# Patient Record
Sex: MALE | Race: WHITE | NOT HISPANIC OR LATINO
[De-identification: names, ages, dates, MRNs, and addresses within clinical notes are randomized per-mention and may not be internally consistent; named-entity substitution may affect disease eponyms.]

---

## 2017-02-14 ENCOUNTER — RESULT REVIEW (OUTPATIENT)
Age: 72
End: 2017-02-14

## 2017-03-19 ENCOUNTER — RESULT REVIEW (OUTPATIENT)
Age: 72
End: 2017-03-19

## 2017-04-27 ENCOUNTER — RESULT REVIEW (OUTPATIENT)
Age: 72
End: 2017-04-27

## 2017-04-27 ENCOUNTER — FORM ENCOUNTER (OUTPATIENT)
Age: 72
End: 2017-04-27

## 2017-04-28 ENCOUNTER — APPOINTMENT (OUTPATIENT)
Age: 72
End: 2017-04-28

## 2017-04-28 ENCOUNTER — LABORATORY RESULT (OUTPATIENT)
Age: 72
End: 2017-04-28

## 2017-04-28 DIAGNOSIS — I70.209 UNSPECIFIED ATHEROSCLEROSIS OF NATIVE ARTERIES OF EXTREMITIES, UNSPECIFIED EXTREMITY: ICD-10-CM

## 2017-05-23 ENCOUNTER — APPOINTMENT (OUTPATIENT)
Dept: VASCULAR SURGERY | Facility: CLINIC | Age: 72
End: 2017-05-23

## 2017-05-23 VITALS — BODY MASS INDEX: 30.34 KG/M2 | HEIGHT: 72 IN | TEMPERATURE: 98.2 F | RESPIRATION RATE: 16 BRPM | WEIGHT: 224 LBS

## 2017-05-23 VITALS — DIASTOLIC BLOOD PRESSURE: 94 MMHG | SYSTOLIC BLOOD PRESSURE: 162 MMHG | HEART RATE: 67 BPM

## 2017-05-23 DIAGNOSIS — Z87.09 PERSONAL HISTORY OF OTHER DISEASES OF THE RESPIRATORY SYSTEM: ICD-10-CM

## 2017-05-23 DIAGNOSIS — I10 ESSENTIAL (PRIMARY) HYPERTENSION: ICD-10-CM

## 2017-05-23 DIAGNOSIS — Z86.69 PERSONAL HISTORY OF OTHER DISEASES OF THE NERVOUS SYSTEM AND SENSE ORGANS: ICD-10-CM

## 2017-05-23 DIAGNOSIS — N28.9 DISORDER OF KIDNEY AND URETER, UNSPECIFIED: ICD-10-CM

## 2017-05-23 DIAGNOSIS — Z87.19 PERSONAL HISTORY OF OTHER DISEASES OF THE DIGESTIVE SYSTEM: ICD-10-CM

## 2017-05-23 DIAGNOSIS — Z78.9 OTHER SPECIFIED HEALTH STATUS: ICD-10-CM

## 2017-05-23 RX ORDER — TAMSULOSIN HYDROCHLORIDE 0.4 MG/1
0.4 CAPSULE ORAL
Refills: 0 | Status: ACTIVE | COMMUNITY

## 2017-05-23 RX ORDER — HYDRALAZINE HYDROCHLORIDE 25 MG/1
25 TABLET ORAL
Refills: 0 | Status: ACTIVE | COMMUNITY

## 2017-05-23 RX ORDER — METOPROLOL SUCCINATE 100 MG/1
100 TABLET, EXTENDED RELEASE ORAL
Refills: 0 | Status: ACTIVE | COMMUNITY

## 2017-09-21 ENCOUNTER — APPOINTMENT (OUTPATIENT)
Dept: VASCULAR SURGERY | Facility: CLINIC | Age: 72
End: 2017-09-21
Payer: MEDICARE

## 2017-09-21 VITALS
WEIGHT: 222 LBS | HEART RATE: 69 BPM | HEIGHT: 72 IN | BODY MASS INDEX: 30.07 KG/M2 | SYSTOLIC BLOOD PRESSURE: 140 MMHG | TEMPERATURE: 98 F | DIASTOLIC BLOOD PRESSURE: 90 MMHG

## 2017-09-21 PROCEDURE — 99213 OFFICE O/P EST LOW 20 MIN: CPT | Mod: 25

## 2017-09-21 PROCEDURE — 93926 LOWER EXTREMITY STUDY: CPT

## 2017-09-21 RX ORDER — ATORVASTATIN CALCIUM 10 MG/1
10 TABLET, FILM COATED ORAL
Refills: 0 | Status: COMPLETED | COMMUNITY
End: 2017-09-21

## 2017-12-19 ENCOUNTER — APPOINTMENT (OUTPATIENT)
Dept: VASCULAR SURGERY | Facility: CLINIC | Age: 72
End: 2017-12-19
Payer: MEDICARE

## 2017-12-19 PROCEDURE — 99213 OFFICE O/P EST LOW 20 MIN: CPT

## 2017-12-19 PROCEDURE — 93926 LOWER EXTREMITY STUDY: CPT

## 2018-01-10 ENCOUNTER — RESULT REVIEW (OUTPATIENT)
Age: 73
End: 2018-01-10

## 2018-03-07 ENCOUNTER — OUTPATIENT (OUTPATIENT)
Dept: OUTPATIENT SERVICES | Facility: HOSPITAL | Age: 73
LOS: 1 days | End: 2018-03-07
Payer: COMMERCIAL

## 2018-03-07 ENCOUNTER — APPOINTMENT (OUTPATIENT)
Dept: CV DIAGNOSTICS | Facility: HOSPITAL | Age: 73
End: 2018-03-07

## 2018-03-07 DIAGNOSIS — R06.09 OTHER FORMS OF DYSPNEA: ICD-10-CM

## 2018-03-07 PROCEDURE — 93017 CV STRESS TEST TRACING ONLY: CPT

## 2018-03-07 PROCEDURE — 78452 HT MUSCLE IMAGE SPECT MULT: CPT

## 2018-03-07 PROCEDURE — 93018 CV STRESS TEST I&R ONLY: CPT

## 2018-03-07 PROCEDURE — 93016 CV STRESS TEST SUPVJ ONLY: CPT

## 2018-03-07 PROCEDURE — 78452 HT MUSCLE IMAGE SPECT MULT: CPT | Mod: 26

## 2018-03-07 PROCEDURE — A9500: CPT

## 2018-03-20 ENCOUNTER — APPOINTMENT (OUTPATIENT)
Dept: VASCULAR SURGERY | Facility: CLINIC | Age: 73
End: 2018-03-20
Payer: MEDICARE

## 2018-03-20 DIAGNOSIS — M25.473 EFFUSION, UNSPECIFIED ANKLE: ICD-10-CM

## 2018-03-20 PROCEDURE — 93971 EXTREMITY STUDY: CPT

## 2018-03-20 PROCEDURE — 99213 OFFICE O/P EST LOW 20 MIN: CPT

## 2018-03-20 RX ORDER — AMOXICILLIN AND CLAVULANATE POTASSIUM 875; 125 MG/1; 1/1
875-125 TABLET, FILM COATED ORAL
Refills: 0 | Status: COMPLETED | COMMUNITY
End: 2018-03-20

## 2018-04-11 ENCOUNTER — OUTPATIENT (OUTPATIENT)
Dept: OUTPATIENT SERVICES | Facility: HOSPITAL | Age: 73
LOS: 1 days | Discharge: HOME | End: 2018-04-11
Payer: MEDICARE

## 2018-04-11 VITALS — HEIGHT: 72 IN | WEIGHT: 231.49 LBS

## 2018-04-11 DIAGNOSIS — R94.39 ABNORMAL RESULT OF OTHER CARDIOVASCULAR FUNCTION STUDY: ICD-10-CM

## 2018-04-11 LAB
BUN SERPL-MCNC: 26 MG/DL — HIGH (ref 7–23)
CALCIUM SERPL-MCNC: 9 MG/DL — SIGNIFICANT CHANGE UP (ref 8.4–10.5)
CHLORIDE SERPL-SCNC: 103 MMOL/L — SIGNIFICANT CHANGE UP (ref 98–107)
CO2 SERPL-SCNC: 24 MMOL/L — SIGNIFICANT CHANGE UP (ref 22–31)
CREAT SERPL-MCNC: 1.36 MG/DL — HIGH (ref 0.5–1.3)
GLUCOSE BLDC GLUCOMTR-MCNC: 120 MG/DL — HIGH (ref 70–99)
GLUCOSE SERPL-MCNC: 130 MG/DL — HIGH (ref 70–99)
HBA1C BLD-MCNC: 6.5 % — HIGH (ref 4–5.6)
HCT VFR BLD CALC: 46.3 % — SIGNIFICANT CHANGE UP (ref 39–50)
HGB BLD-MCNC: 15.2 G/DL — SIGNIFICANT CHANGE UP (ref 13–17)
MCHC RBC-ENTMCNC: 29.8 PG — SIGNIFICANT CHANGE UP (ref 27–34)
MCHC RBC-ENTMCNC: 32.8 % — SIGNIFICANT CHANGE UP (ref 32–36)
MCV RBC AUTO: 90.8 FL — SIGNIFICANT CHANGE UP (ref 80–100)
NRBC # FLD: 0 — SIGNIFICANT CHANGE UP
PLATELET # BLD AUTO: 224 K/UL — SIGNIFICANT CHANGE UP (ref 150–400)
PMV BLD: 9.7 FL — SIGNIFICANT CHANGE UP (ref 7–13)
POTASSIUM SERPL-MCNC: 3.9 MMOL/L — SIGNIFICANT CHANGE UP (ref 3.5–5.3)
POTASSIUM SERPL-SCNC: 3.9 MMOL/L — SIGNIFICANT CHANGE UP (ref 3.5–5.3)
RBC # BLD: 5.1 M/UL — SIGNIFICANT CHANGE UP (ref 4.2–5.8)
RBC # FLD: 13.4 % — SIGNIFICANT CHANGE UP (ref 10.3–14.5)
SODIUM SERPL-SCNC: 140 MMOL/L — SIGNIFICANT CHANGE UP (ref 135–145)
WBC # BLD: 7.22 K/UL — SIGNIFICANT CHANGE UP (ref 3.8–10.5)
WBC # FLD AUTO: 7.22 K/UL — SIGNIFICANT CHANGE UP (ref 3.8–10.5)

## 2018-04-11 PROCEDURE — 93010 ELECTROCARDIOGRAM REPORT: CPT

## 2018-04-11 PROCEDURE — 93458 L HRT ARTERY/VENTRICLE ANGIO: CPT | Mod: 26

## 2018-04-11 PROCEDURE — 99152 MOD SED SAME PHYS/QHP 5/>YRS: CPT

## 2018-04-11 RX ORDER — SODIUM CHLORIDE 9 MG/ML
500 INJECTION INTRAMUSCULAR; INTRAVENOUS; SUBCUTANEOUS
Qty: 0 | Refills: 0 | Status: DISCONTINUED | OUTPATIENT
Start: 2018-04-11 | End: 2018-04-26

## 2018-04-11 RX ORDER — ASPIRIN/CALCIUM CARB/MAGNESIUM 324 MG
81 TABLET ORAL ONCE
Qty: 0 | Refills: 0 | Status: COMPLETED | OUTPATIENT
Start: 2018-04-11 | End: 2018-04-11

## 2018-04-11 RX ORDER — SODIUM CHLORIDE 9 MG/ML
3 INJECTION INTRAMUSCULAR; INTRAVENOUS; SUBCUTANEOUS EVERY 8 HOURS
Qty: 0 | Refills: 0 | Status: DISCONTINUED | OUTPATIENT
Start: 2018-04-11 | End: 2018-04-26

## 2018-04-11 RX ADMIN — Medication 81 MILLIGRAM(S): at 11:09

## 2018-04-11 RX ADMIN — SODIUM CHLORIDE 100 MILLILITER(S): 9 INJECTION INTRAMUSCULAR; INTRAVENOUS; SUBCUTANEOUS at 11:11

## 2018-04-11 NOTE — H&P CARDIOLOGY - PMH
Asthma    HTN (hypertension)    Hyperlipidemia Asthma    CKD (chronic kidney disease)    COPD (chronic obstructive pulmonary disease)    Elevated LFTs  details unknown from patient  HTN (hypertension)    Hyperlipidemia    Obese    PAD (peripheral artery disease)  stent 2017  Pre-diabetes

## 2018-04-11 NOTE — H&P CARDIOLOGY - COMMENTS
patient took his daliy dose of Plavix 75mg po x1 today   will give ASA 81mg po x1 today as patient did not take his daily dose patient took his daliy dose of Plavix 75mg po x1 today   will give ASA 81mg po x1 today as patient did not take his daily dose  unclear history of CKD per patient, BMP pending, will start NS 100cc/h x1 hour pre cath prophylactically to minimize risk of MODESTO

## 2018-04-11 NOTE — CHART NOTE - NSCHARTNOTEFT_GEN_A_CORE
The patient was noted to have elevated HgbA1c 6.5. The patient was made aware of diabetes diagnosis and education given. The patient was recommended to f/u with PMD for further treatment.

## 2018-04-11 NOTE — H&P CARDIOLOGY - HISTORY OF PRESENT ILLNESS
72 year old male with HTN, hyperlipidemia, asthma who presented to her Cardiologist complaining of   Underwent an Echo 2/5/18 revealing EF 60%, mild MR, mild TR and Stress test 3/7/18 with reported abnormal results.   In light of patients cardiac risk factors, symptoms and abnormal noninvasive test findings there is high suspicion for CAD. Patient is now referred to [facility] for a cardiac catheterization with possible PTCA/stent. 72 year old male former smoker, former etoh abuse with HTN, hyperlipidemia, asthma/COPD, CKD (baseline unknown), "liver disease" who presented to her Cardiologist complaining of SOB and increase in fatigue with a decrease in exercise tolerance walking 1 block over the past 1 year, relieved with rest. Denies chest pain, dizziness, syncope, edema, orthopnea, PND. Underwent an Echo 2/5/18 revealing EF 60%, mild MR, mild TR and Stress test 3/7/18 with reported abnormal results.   In light of patients cardiac risk factors, symptoms and abnormal noninvasive test findings there is high suspicion for CAD. Patient is now referred to [facility] for a cardiac catheterization with possible PTCA/stent.

## 2018-07-05 ENCOUNTER — APPOINTMENT (OUTPATIENT)
Dept: VASCULAR SURGERY | Facility: CLINIC | Age: 73
End: 2018-07-05
Payer: MEDICARE

## 2018-07-05 VITALS
HEIGHT: 72 IN | HEART RATE: 68 BPM | BODY MASS INDEX: 32.51 KG/M2 | WEIGHT: 240 LBS | DIASTOLIC BLOOD PRESSURE: 98 MMHG | SYSTOLIC BLOOD PRESSURE: 167 MMHG

## 2018-07-05 PROCEDURE — 99213 OFFICE O/P EST LOW 20 MIN: CPT

## 2018-07-05 PROCEDURE — 93926 LOWER EXTREMITY STUDY: CPT

## 2018-07-17 PROBLEM — R79.89 OTHER SPECIFIED ABNORMAL FINDINGS OF BLOOD CHEMISTRY: Chronic | Status: ACTIVE | Noted: 2018-04-11

## 2019-01-08 ENCOUNTER — APPOINTMENT (OUTPATIENT)
Dept: VASCULAR SURGERY | Facility: CLINIC | Age: 74
End: 2019-01-08

## 2019-05-27 ENCOUNTER — INPATIENT (INPATIENT)
Facility: HOSPITAL | Age: 74
LOS: 6 days | Discharge: HOME | DRG: 603 | End: 2019-06-03
Attending: INTERNAL MEDICINE | Admitting: INTERNAL MEDICINE
Payer: COMMERCIAL

## 2019-05-27 VITALS
SYSTOLIC BLOOD PRESSURE: 115 MMHG | WEIGHT: 240.08 LBS | HEART RATE: 80 BPM | TEMPERATURE: 99 F | DIASTOLIC BLOOD PRESSURE: 63 MMHG | RESPIRATION RATE: 18 BRPM | OXYGEN SATURATION: 96 %

## 2019-05-27 DIAGNOSIS — N18.9 CHRONIC KIDNEY DISEASE, UNSPECIFIED: ICD-10-CM

## 2019-05-27 DIAGNOSIS — I10 ESSENTIAL (PRIMARY) HYPERTENSION: ICD-10-CM

## 2019-05-27 DIAGNOSIS — L03.90 CELLULITIS, UNSPECIFIED: ICD-10-CM

## 2019-05-27 DIAGNOSIS — I73.9 PERIPHERAL VASCULAR DISEASE, UNSPECIFIED: ICD-10-CM

## 2019-05-27 DIAGNOSIS — I25.10 ATHEROSCLEROTIC HEART DISEASE OF NATIVE CORONARY ARTERY WITHOUT ANGINA PECTORIS: ICD-10-CM

## 2019-05-27 DIAGNOSIS — J44.9 CHRONIC OBSTRUCTIVE PULMONARY DISEASE, UNSPECIFIED: ICD-10-CM

## 2019-05-27 DIAGNOSIS — Z90.89 ACQUIRED ABSENCE OF OTHER ORGANS: Chronic | ICD-10-CM

## 2019-05-27 DIAGNOSIS — N40.0 BENIGN PROSTATIC HYPERPLASIA WITHOUT LOWER URINARY TRACT SYMPTOMS: ICD-10-CM

## 2019-05-27 DIAGNOSIS — Z29.9 ENCOUNTER FOR PROPHYLACTIC MEASURES, UNSPECIFIED: ICD-10-CM

## 2019-05-27 DIAGNOSIS — E11.9 TYPE 2 DIABETES MELLITUS WITHOUT COMPLICATIONS: ICD-10-CM

## 2019-05-27 DIAGNOSIS — E78.5 HYPERLIPIDEMIA, UNSPECIFIED: ICD-10-CM

## 2019-05-27 PROBLEM — J45.909 UNSPECIFIED ASTHMA, UNCOMPLICATED: Chronic | Status: ACTIVE | Noted: 2018-04-11

## 2019-05-27 PROBLEM — E66.9 OBESITY, UNSPECIFIED: Chronic | Status: ACTIVE | Noted: 2018-04-11

## 2019-05-27 PROBLEM — R73.03 PREDIABETES: Chronic | Status: ACTIVE | Noted: 2018-04-11

## 2019-05-27 LAB
ALBUMIN SERPL ELPH-MCNC: 3.5 G/DL — SIGNIFICANT CHANGE UP (ref 3.3–5)
ALP SERPL-CCNC: 56 U/L — SIGNIFICANT CHANGE UP (ref 40–120)
ALT FLD-CCNC: 35 U/L — SIGNIFICANT CHANGE UP (ref 12–78)
ANION GAP SERPL CALC-SCNC: 7 MMOL/L — SIGNIFICANT CHANGE UP (ref 5–17)
APPEARANCE UR: CLEAR — SIGNIFICANT CHANGE UP
AST SERPL-CCNC: 39 U/L — HIGH (ref 15–37)
BASOPHILS # BLD AUTO: 0.03 K/UL — SIGNIFICANT CHANGE UP (ref 0–0.2)
BASOPHILS NFR BLD AUTO: 0.3 % — SIGNIFICANT CHANGE UP (ref 0–2)
BILIRUB SERPL-MCNC: 0.9 MG/DL — SIGNIFICANT CHANGE UP (ref 0.2–1.2)
BILIRUB UR-MCNC: NEGATIVE — SIGNIFICANT CHANGE UP
BUN SERPL-MCNC: 26 MG/DL — HIGH (ref 7–23)
CALCIUM SERPL-MCNC: 8.9 MG/DL — SIGNIFICANT CHANGE UP (ref 8.5–10.1)
CHLORIDE SERPL-SCNC: 105 MMOL/L — SIGNIFICANT CHANGE UP (ref 96–108)
CO2 SERPL-SCNC: 26 MMOL/L — SIGNIFICANT CHANGE UP (ref 22–31)
COLOR SPEC: YELLOW — SIGNIFICANT CHANGE UP
CREAT SERPL-MCNC: 1.5 MG/DL — HIGH (ref 0.5–1.3)
CRP SERPL-MCNC: 13.29 MG/DL — HIGH (ref 0–0.4)
DIFF PNL FLD: ABNORMAL
EOSINOPHIL # BLD AUTO: 0.02 K/UL — SIGNIFICANT CHANGE UP (ref 0–0.5)
EOSINOPHIL NFR BLD AUTO: 0.2 % — SIGNIFICANT CHANGE UP (ref 0–6)
ERYTHROCYTE [SEDIMENTATION RATE] IN BLOOD: 67 MM/HR — HIGH (ref 0–20)
GLUCOSE SERPL-MCNC: 116 MG/DL — HIGH (ref 70–99)
GLUCOSE UR QL: 50 MG/DL
HCT VFR BLD CALC: 42.3 % — SIGNIFICANT CHANGE UP (ref 39–50)
HGB BLD-MCNC: 14.2 G/DL — SIGNIFICANT CHANGE UP (ref 13–17)
IMM GRANULOCYTES NFR BLD AUTO: 0.3 % — SIGNIFICANT CHANGE UP (ref 0–1.5)
KETONES UR-MCNC: ABNORMAL
LACTATE SERPL-SCNC: 1.2 MMOL/L — SIGNIFICANT CHANGE UP (ref 0.7–2)
LEUKOCYTE ESTERASE UR-ACNC: NEGATIVE — SIGNIFICANT CHANGE UP
LIDOCAIN IGE QN: 170 U/L — SIGNIFICANT CHANGE UP (ref 73–393)
LYMPHOCYTES # BLD AUTO: 0.82 K/UL — LOW (ref 1–3.3)
LYMPHOCYTES # BLD AUTO: 7.5 % — LOW (ref 13–44)
MCHC RBC-ENTMCNC: 30 PG — SIGNIFICANT CHANGE UP (ref 27–34)
MCHC RBC-ENTMCNC: 33.6 GM/DL — SIGNIFICANT CHANGE UP (ref 32–36)
MCV RBC AUTO: 89.4 FL — SIGNIFICANT CHANGE UP (ref 80–100)
MONOCYTES # BLD AUTO: 1.06 K/UL — HIGH (ref 0–0.9)
MONOCYTES NFR BLD AUTO: 9.6 % — SIGNIFICANT CHANGE UP (ref 2–14)
NEUTROPHILS # BLD AUTO: 9.03 K/UL — HIGH (ref 1.8–7.4)
NEUTROPHILS NFR BLD AUTO: 82.1 % — HIGH (ref 43–77)
NITRITE UR-MCNC: NEGATIVE — SIGNIFICANT CHANGE UP
NRBC # BLD: 0 /100 WBCS — SIGNIFICANT CHANGE UP (ref 0–0)
PH UR: 5 — SIGNIFICANT CHANGE UP (ref 5–8)
PLATELET # BLD AUTO: 188 K/UL — SIGNIFICANT CHANGE UP (ref 150–400)
POTASSIUM SERPL-MCNC: 3.4 MMOL/L — LOW (ref 3.5–5.3)
POTASSIUM SERPL-SCNC: 3.4 MMOL/L — LOW (ref 3.5–5.3)
PROCALCITONIN SERPL-MCNC: 2.39 NG/ML — HIGH (ref 0–0.04)
PROT SERPL-MCNC: 7.4 G/DL — SIGNIFICANT CHANGE UP (ref 6–8.3)
PROT UR-MCNC: 75 MG/DL
RBC # BLD: 4.73 M/UL — SIGNIFICANT CHANGE UP (ref 4.2–5.8)
RBC # FLD: 14.3 % — SIGNIFICANT CHANGE UP (ref 10.3–14.5)
SODIUM SERPL-SCNC: 138 MMOL/L — SIGNIFICANT CHANGE UP (ref 135–145)
SP GR SPEC: 1.02 — SIGNIFICANT CHANGE UP (ref 1.01–1.02)
UROBILINOGEN FLD QL: 1
WBC # BLD: 10.99 K/UL — HIGH (ref 3.8–10.5)
WBC # FLD AUTO: 10.99 K/UL — HIGH (ref 3.8–10.5)

## 2019-05-27 PROCEDURE — 73590 X-RAY EXAM OF LOWER LEG: CPT | Mod: 26,RT

## 2019-05-27 PROCEDURE — 73610 X-RAY EXAM OF ANKLE: CPT | Mod: 26,RT

## 2019-05-27 PROCEDURE — 93971 EXTREMITY STUDY: CPT | Mod: 26,RT

## 2019-05-27 PROCEDURE — 99285 EMERGENCY DEPT VISIT HI MDM: CPT

## 2019-05-27 PROCEDURE — 99223 1ST HOSP IP/OBS HIGH 75: CPT | Mod: GC,AI

## 2019-05-27 PROCEDURE — 73562 X-RAY EXAM OF KNEE 3: CPT | Mod: 26,RT

## 2019-05-27 PROCEDURE — 93010 ELECTROCARDIOGRAM REPORT: CPT

## 2019-05-27 RX ORDER — AMLODIPINE BESYLATE 2.5 MG/1
10 TABLET ORAL DAILY
Refills: 0 | Status: DISCONTINUED | OUTPATIENT
Start: 2019-05-27 | End: 2019-06-03

## 2019-05-27 RX ORDER — METOPROLOL TARTRATE 50 MG
50 TABLET ORAL DAILY
Refills: 0 | Status: DISCONTINUED | OUTPATIENT
Start: 2019-05-27 | End: 2019-06-03

## 2019-05-27 RX ORDER — VANCOMYCIN HCL 1 G
1250 VIAL (EA) INTRAVENOUS EVERY 12 HOURS
Refills: 0 | Status: DISCONTINUED | OUTPATIENT
Start: 2019-05-28 | End: 2019-05-28

## 2019-05-27 RX ORDER — AMLODIPINE VALSARTAN AND HYDROCHLOROTHIAZIDE 10; 160; 25 MG/1; MG/1; MG/1
1 TABLET, FILM COATED ORAL
Qty: 0 | Refills: 0 | DISCHARGE

## 2019-05-27 RX ORDER — CLOPIDOGREL BISULFATE 75 MG/1
75 TABLET, FILM COATED ORAL DAILY
Refills: 0 | Status: DISCONTINUED | OUTPATIENT
Start: 2019-05-27 | End: 2019-06-03

## 2019-05-27 RX ORDER — VANCOMYCIN HCL 1 G
1000 VIAL (EA) INTRAVENOUS EVERY 12 HOURS
Refills: 0 | Status: DISCONTINUED | OUTPATIENT
Start: 2019-05-27 | End: 2019-05-27

## 2019-05-27 RX ORDER — FAMOTIDINE 10 MG/ML
20 INJECTION INTRAVENOUS
Refills: 0 | Status: DISCONTINUED | OUTPATIENT
Start: 2019-05-27 | End: 2019-06-03

## 2019-05-27 RX ORDER — ATORVASTATIN CALCIUM 80 MG/1
10 TABLET, FILM COATED ORAL AT BEDTIME
Refills: 0 | Status: DISCONTINUED | OUTPATIENT
Start: 2019-05-27 | End: 2019-06-03

## 2019-05-27 RX ORDER — ACETAMINOPHEN 500 MG
650 TABLET ORAL EVERY 6 HOURS
Refills: 0 | Status: DISCONTINUED | OUTPATIENT
Start: 2019-05-27 | End: 2019-05-28

## 2019-05-27 RX ORDER — FLUTICASONE PROPIONATE 220 MCG
1 AEROSOL WITH ADAPTER (GRAM) INHALATION
Qty: 0 | Refills: 0 | DISCHARGE

## 2019-05-27 RX ORDER — TAMSULOSIN HYDROCHLORIDE 0.4 MG/1
0.4 CAPSULE ORAL AT BEDTIME
Refills: 0 | Status: DISCONTINUED | OUTPATIENT
Start: 2019-05-27 | End: 2019-06-03

## 2019-05-27 RX ORDER — HEPARIN SODIUM 5000 [USP'U]/ML
5000 INJECTION INTRAVENOUS; SUBCUTANEOUS EVERY 8 HOURS
Refills: 0 | Status: DISCONTINUED | OUTPATIENT
Start: 2019-05-27 | End: 2019-06-03

## 2019-05-27 RX ORDER — FENOFIBRATE,MICRONIZED 130 MG
48 CAPSULE ORAL DAILY
Refills: 0 | Status: DISCONTINUED | OUTPATIENT
Start: 2019-05-27 | End: 2019-06-03

## 2019-05-27 RX ORDER — ASPIRIN/CALCIUM CARB/MAGNESIUM 324 MG
81 TABLET ORAL DAILY
Refills: 0 | Status: DISCONTINUED | OUTPATIENT
Start: 2019-05-27 | End: 2019-06-03

## 2019-05-27 RX ORDER — AMPICILLIN SODIUM AND SULBACTAM SODIUM 250; 125 MG/ML; MG/ML
3 INJECTION, POWDER, FOR SUSPENSION INTRAMUSCULAR; INTRAVENOUS ONCE
Refills: 0 | Status: COMPLETED | OUTPATIENT
Start: 2019-05-27 | End: 2019-05-27

## 2019-05-27 RX ORDER — HYDRALAZINE HCL 50 MG
50 TABLET ORAL
Refills: 0 | Status: DISCONTINUED | OUTPATIENT
Start: 2019-05-27 | End: 2019-06-03

## 2019-05-27 RX ORDER — SODIUM CHLORIDE 9 MG/ML
3 INJECTION INTRAMUSCULAR; INTRAVENOUS; SUBCUTANEOUS ONCE
Refills: 0 | Status: COMPLETED | OUTPATIENT
Start: 2019-05-27 | End: 2019-05-27

## 2019-05-27 RX ORDER — AMPICILLIN SODIUM AND SULBACTAM SODIUM 250; 125 MG/ML; MG/ML
3 INJECTION, POWDER, FOR SUSPENSION INTRAMUSCULAR; INTRAVENOUS EVERY 6 HOURS
Refills: 0 | Status: DISCONTINUED | OUTPATIENT
Start: 2019-05-27 | End: 2019-05-30

## 2019-05-27 RX ORDER — VANCOMYCIN HCL 1 G
1000 VIAL (EA) INTRAVENOUS ONCE
Refills: 0 | Status: COMPLETED | OUTPATIENT
Start: 2019-05-27 | End: 2019-05-27

## 2019-05-27 RX ADMIN — HEPARIN SODIUM 5000 UNIT(S): 5000 INJECTION INTRAVENOUS; SUBCUTANEOUS at 22:46

## 2019-05-27 RX ADMIN — FAMOTIDINE 20 MILLIGRAM(S): 10 INJECTION INTRAVENOUS at 18:31

## 2019-05-27 RX ADMIN — AMPICILLIN SODIUM AND SULBACTAM SODIUM 200 GRAM(S): 250; 125 INJECTION, POWDER, FOR SUSPENSION INTRAMUSCULAR; INTRAVENOUS at 15:55

## 2019-05-27 RX ADMIN — Medication 50 MILLIGRAM(S): at 18:32

## 2019-05-27 RX ADMIN — AMPICILLIN SODIUM AND SULBACTAM SODIUM 200 GRAM(S): 250; 125 INJECTION, POWDER, FOR SUSPENSION INTRAMUSCULAR; INTRAVENOUS at 22:47

## 2019-05-27 RX ADMIN — ATORVASTATIN CALCIUM 10 MILLIGRAM(S): 80 TABLET, FILM COATED ORAL at 22:46

## 2019-05-27 RX ADMIN — AMPICILLIN SODIUM AND SULBACTAM SODIUM 3 GRAM(S): 250; 125 INJECTION, POWDER, FOR SUSPENSION INTRAMUSCULAR; INTRAVENOUS at 16:35

## 2019-05-27 RX ADMIN — SODIUM CHLORIDE 3 MILLILITER(S): 9 INJECTION INTRAMUSCULAR; INTRAVENOUS; SUBCUTANEOUS at 13:30

## 2019-05-27 RX ADMIN — Medication 250 MILLIGRAM(S): at 16:45

## 2019-05-27 RX ADMIN — TAMSULOSIN HYDROCHLORIDE 0.4 MILLIGRAM(S): 0.4 CAPSULE ORAL at 22:47

## 2019-05-27 NOTE — H&P ADULT - PROBLEM SELECTOR PLAN 6
- JULIET on CKD (most recent BUN/Cr 16/1.32)  - avoid nephrotoxic medications  - resume losartan once JULIET improves

## 2019-05-27 NOTE — H&P ADULT - PROBLEM SELECTOR PLAN 2
- on home meds losartan, amlodipine, hydralazine, metoprolol   - will continue amlodipine, hydralazine, metoprolol with hold parameters  - will hold losartan for now due to worsening renal fxn - on home meds losartan, amlodipine, hydralazine, metoprolol   - will continue amlodipine, hydralazine, metoprolol with hold parameters  - will hold losartan for now due to JULIET on CKD (recent BUN/CR was 16/1.32)

## 2019-05-27 NOTE — H&P ADULT - NSICDXPASTMEDICALHX_GEN_ALL_CORE_FT
PAST MEDICAL HISTORY:  Asthma     CAD (coronary artery disease)     CKD (chronic kidney disease)     COPD (chronic obstructive pulmonary disease)     Elevated LFTs details unknown from patient    Enlarged prostate with lower urinary tract symptoms (LUTS)     HTN (hypertension)     Hyperlipidemia     Hyperparathyroidism     LBBB (left bundle branch block)     Obese     PAD (peripheral artery disease) stent 2017    Pre-diabetes     PVD (peripheral vascular disease)     Type 2 diabetes mellitus

## 2019-05-27 NOTE — ED ADULT NURSE NOTE - GASTROINTESTINAL WDL
If she has associated fever and chills she should be seen in the office ASAP, or the ER if after hours    Otherwise have her book an appt with me Tuesday 10/30
Pt called c/o having some strong cramps in her lower pelvic area along with discharge-white in color, odor, and irritation  States she was told to call the office if she experienced any of these symptoms   Pt would like a call back from [facility] [phone]
Pt made appt with Najma Pierce on 10/30/18
Abdomen soft, nontender, nondistended, bowel sounds present in all 4 quadrants.

## 2019-05-27 NOTE — H&P ADULT - NSHPPHYSICALEXAM_GEN_ALL_CORE
Physical Exam:  General: Well developed, well nourished, NAD  HEENT: NCAT, PERRL, EOMI bl, moist mucous membranes   Neck: Supple, no JVD  Neurology: A&Ox3, nonfocal, sensation intact  Respiratory: CTA B/L, No W/R/R  CV: RRR, +S1/S2  Abdominal: distended, Soft, NT, +BSx4  Extremities: RLE edema, erythema, and tenderness extending from right dorsal foot to right upper inner thigh region, LLE 1+ pitting edema without erythema or tenderness, distal pulses intact b/l LE   Skin: warm, dry

## 2019-05-27 NOTE — H&P ADULT - ASSESSMENT
Patient is a 73 male with pmhx of CAD, CKD, COPD, DM2 (diet controlled), PAD, LBBB, HTN, HLD, BPH, hyperparathyroidism, hx of etoh abuse, presented to ED with right lower extremity swelling, erythema, and tenderness for 4 days. Admit for evaluation and management of RLE cellulitis

## 2019-05-27 NOTE — H&P ADULT - NSHPSOCIALHISTORY_GEN_ALL_CORE
Denied tobacco use - previously quit smoking over 20 years ago  current social etoh   denies other illicit drug use  ambulates independently at baseline

## 2019-05-27 NOTE — ED PROVIDER NOTE - OBJECTIVE STATEMENT
Pt is a 72 yo male who presents to the ED with a cc of possible leg infection.  PMHx of Asthma, CAD, CKD, h/o COPD, BPH, HTN, HLD, hyperparathyroidism, h/o LBBB, obesity, PAD, h/o DM diet controlled.  Pt reports that 4 days ago he noted redness and swelling to his right lower ext/ankle.  Pt reports that symptoms worsened and the redness began to streak up into his groin. He reported associated pain with this which has improved somewhat.  Pt further reports subjective fevers, and chills.  Denies N/V, CP, SOB, abd pain.  Denies ext numbness.  Pt does report that he suffers from PVD and that he has a stent in his RLE which he why he was concerned.  He was seen by his PMD and was sent to the ED for further work up.  Pt wife does report that just prior to onset of symptoms he had been working in his garden and he has cut his right shin.

## 2019-05-27 NOTE — ED ADULT NURSE NOTE - CHPI ED NUR SYMPTOMS NEG
no headache/no rash/no shortness of breath/no cough/no decreased eating/drinking/no abdominal pain/no diarrhea/no vomiting

## 2019-05-27 NOTE — ED PROVIDER NOTE - PHYSICAL EXAMINATION
RLE: Diffuse swelling and redness noted to foot/ankle, extending to tib/fib, anterior knee, and streaking up to groin.  Increased warmth noted.  No gas palpable in the tissue, sensation intact cap refill less then 2 seconds +pedal pulse

## 2019-05-27 NOTE — ED ADULT NURSE NOTE - PMH
Asthma    CKD (chronic kidney disease)    COPD (chronic obstructive pulmonary disease)    Elevated LFTs  details unknown from patient  HTN (hypertension)    Hyperlipidemia    Obese    PAD (peripheral artery disease)  stent 2017  Pre-diabetes Asthma    CAD (coronary artery disease)    CKD (chronic kidney disease)    COPD (chronic obstructive pulmonary disease)    Elevated LFTs  details unknown from patient  Enlarged prostate with lower urinary tract symptoms (LUTS)    HTN (hypertension)    Hyperlipidemia    Hyperparathyroidism    LBBB (left bundle branch block)    Obese    PAD (peripheral artery disease)  stent 2017  Pre-diabetes    PVD (peripheral vascular disease)    Type 2 diabetes mellitus

## 2019-05-27 NOTE — H&P ADULT - HISTORY OF PRESENT ILLNESS
Patient is a 73 male with pmhx of CAD, CKD, COPD, DM2 (diet controlled), PAD, LBBB, HTN, HLD, BPH, hyperparathyroidism, hx of etoh abuse, presented to ED with right lower extremity swelling, erythema, and tenderness for 4 days. Patient stated that his symptoms started all the sudden when he woke up 4 days ago, noticed that his leg swelling extended up to his right inner thigh region, unable to ambulate without significant tenderness. Patient stated that he was gardening about 1 week ago, unsure if he scratched himself, however denied any other trauma or noticed any insect bites. Reports decreased appetite for the past few days. Stated that he was told he had a temperature [facility] today, unsure of the temp. Denied any recent antibiotic use outpatient for his symptoms. Patient denies any headache, dizziness, chest pain, palpitations, sob, lai, abdominal pain, nausea, vomiting, diarrhea, dysuria, hematuria, melena, recent travel.     In the ED, patient was afebrile T99, 80 bpm, 115/63, RR 18@ 96% room air  WBC 10.99, H/H 14.2/42.3, plt 188, ESR 67  Na 138, K 3.4, BUN 26, Cr 1.5, glucose 116, GFR 45  Procalc 2.39  RLE doppler neg for dvt  R ankle xray showed Old chip fracture medial malleolus. No acute fracture dislocation or bone destruction. Soft tissue swelling. Vascular calcification.  R tibia/fibula xray showed No fracture malalignment bone destruction or unusual periosteal reaction. Mild narrowing medial compartment of the knee. Vascular calcification. Nonspecific soft tissue edema throughout the calf.  R knee xray showed No fracture dislocation. Medial compartment narrowing and juxta-articular sclerosis tibial plateau. Calcification medial collateral ligament. Vascular calcification. Small suprapatellar effusion. Nonspecific soft tissue edema.  Received 1x unasyn, 1x vancomycin in ED Patient is a 73 male with pmhx of CAD, CKD, COPD, DM2 (diet controlled), PAD (s/p right groin stent), LBBB, HTN, HLD, BPH, hyperparathyroidism, hx of etoh abuse, presented to ED with right lower extremity swelling, erythema, and tenderness for 4 days. Patient stated that his symptoms started all the sudden when he woke up 4 days ago, noticed that his leg swelling extended up to his right inner thigh region, unable to ambulate without significant tenderness. Patient stated that he was gardening about 1 week ago, unsure if he scratched himself, however wife at bedside stated that patient was trimming hedges and she noticed dried blood on his right leg when he came into the house, denied any other trauma or noticed any insect bites. Reports decreased appetite for the past few days. Stated that he was told he had a temperature at the [facility] today, unsure of the temp. Denied any recent antibiotic use outpatient for his symptoms. Patient denies any headache, dizziness, chest pain, palpitations, sob, lai, abdominal pain, nausea, vomiting, diarrhea, dysuria, hematuria, melena, recent travel.     In the ED, patient was afebrile T99, 80 bpm, 115/63, RR 18@ 96% room air  WBC 10.99, H/H 14.2/42.3, plt 188, ESR 67  Na 138, K 3.4, BUN 26, Cr 1.5, glucose 116, GFR 45  Procalc 2.39  RLE doppler neg for dvt  R ankle xray showed Old chip fracture medial malleolus. No acute fracture dislocation or bone destruction. Soft tissue swelling. Vascular calcification.  R tibia/fibula xray showed No fracture malalignment bone destruction or unusual periosteal reaction. Mild narrowing medial compartment of the knee. Vascular calcification. Nonspecific soft tissue edema throughout the calf.  R knee xray showed No fracture dislocation. Medial compartment narrowing and juxta-articular sclerosis tibial plateau. Calcification medial collateral ligament. Vascular calcification. Small suprapatellar effusion. Nonspecific soft tissue edema.  Received 1x unasyn, 1x vancomycin in ED

## 2019-05-27 NOTE — H&P ADULT - PROBLEM SELECTOR PLAN 1
- cellulitis with erythema, tenderness extending from right foot to right upper inner thigh region, leukocytosis, elevated ESR, elevated procalc  - reported trauma to area appx 1 week ago while gardening   - continue vancomycin, unasyn for empiric coverage  - tylenol prn for fever  - f/u blood cultures  - f/u am labs  - right leg appeared to be very edematous/erythematous/tender to palpation, xray showed medial compartment narrowing, patient has intact distal pulses of b/l LE, sensation intact b/l, leg warm to touch, will monitor closely for signs or symptoms of compartment syndrome

## 2019-05-27 NOTE — H&P ADULT - PROBLEM SELECTOR PLAN 10
- dvt ppx with heparin   - PO consistent carb diet  - GERD - home med ranitidine - therapeutic exchange with pepcid

## 2019-05-27 NOTE — ED PROVIDER NOTE - CLINICAL SUMMARY MEDICAL DECISION MAKING FREE TEXT BOX
Pt with leg swelling, redness, and increased warmth with streaking lymphangitis into the groin.  Concern for infectious process.  Will obtain labs, EKG, and x-rays.  Will begin abx.  Pt will require admission

## 2019-05-27 NOTE — ED PROVIDER NOTE - PMH
Asthma    CAD (coronary artery disease)    CKD (chronic kidney disease)    COPD (chronic obstructive pulmonary disease)    Elevated LFTs  details unknown from patient  Enlarged prostate with lower urinary tract symptoms (LUTS)    HTN (hypertension)    Hyperlipidemia    Hyperparathyroidism    LBBB (left bundle branch block)    Obese    PAD (peripheral artery disease)  stent 2017  Pre-diabetes    PVD (peripheral vascular disease)    Type 2 diabetes mellitus

## 2019-05-27 NOTE — ED ADULT NURSE NOTE - OBJECTIVE STATEMENT
Pt A&Ox4, ambulatory to ED c/o right lower leg redness and swelling.  Pt states that this past Friday night he developed a small spot of "yellow" to medial right ankle and then redness and pain began spreading around right foot and up lower right leg.  Pt has redness, swelling, and warmth to right lower leg but no open wounds.  Pt states that today pain has developed in medial right upper leg.  Pt has "stent" in right groin.  Pt seen at urgent care and sent to ED for eval.

## 2019-05-27 NOTE — ED ADULT NURSE REASSESSMENT NOTE - NS ED NURSE REASSESS COMMENT FT1
Received patient awake and alert x 4 from previous shift RN, Introduced self to patient, updated patient about plan of care and admission to the hospital. Verbalized no complaint or discomfort, denies any pain at this time, safety and comfort maintained, will continue to monitor.

## 2019-05-27 NOTE — ED ADULT NURSE NOTE - NSIMPLEMENTINTERV_GEN_ALL_ED
Implemented All Fall with Harm Risk Interventions:  [place] to call system. Call bell, personal items and telephone within reach. Instruct patient to call for assistance. Room bathroom lighting operational. Non-slip footwear when patient is off stretcher. Physically safe environment: no spills, clutter or unnecessary equipment. Stretcher in lowest position, wheels locked, appropriate side rails in place. Provide visual cue, wrist band, yellow gown, etc. Monitor gait and stability. Monitor for mental status changes and reorient to person, place, and time. Review medications for side effects contributing to fall risk. Reinforce activity limits and safety measures with patient and family. Provide visual clues: red socks.

## 2019-05-28 ENCOUNTER — TRANSCRIPTION ENCOUNTER (OUTPATIENT)
Age: 74
End: 2019-05-28

## 2019-05-28 LAB
ALBUMIN SERPL ELPH-MCNC: 2.8 G/DL — LOW (ref 3.3–5)
ALP SERPL-CCNC: 55 U/L — SIGNIFICANT CHANGE UP (ref 40–120)
ALT FLD-CCNC: 33 U/L — SIGNIFICANT CHANGE UP (ref 12–78)
ANION GAP SERPL CALC-SCNC: 9 MMOL/L — SIGNIFICANT CHANGE UP (ref 5–17)
AST SERPL-CCNC: 33 U/L — SIGNIFICANT CHANGE UP (ref 15–37)
BASOPHILS # BLD AUTO: 0.03 K/UL — SIGNIFICANT CHANGE UP (ref 0–0.2)
BASOPHILS NFR BLD AUTO: 0.3 % — SIGNIFICANT CHANGE UP (ref 0–2)
BILIRUB SERPL-MCNC: 0.9 MG/DL — SIGNIFICANT CHANGE UP (ref 0.2–1.2)
BUN SERPL-MCNC: 20 MG/DL — SIGNIFICANT CHANGE UP (ref 7–23)
CALCIUM SERPL-MCNC: 8 MG/DL — LOW (ref 8.5–10.1)
CHLORIDE SERPL-SCNC: 108 MMOL/L — SIGNIFICANT CHANGE UP (ref 96–108)
CO2 SERPL-SCNC: 24 MMOL/L — SIGNIFICANT CHANGE UP (ref 22–31)
CREAT SERPL-MCNC: 1.1 MG/DL — SIGNIFICANT CHANGE UP (ref 0.5–1.3)
CULTURE RESULTS: NO GROWTH — SIGNIFICANT CHANGE UP
EOSINOPHIL # BLD AUTO: 0.06 K/UL — SIGNIFICANT CHANGE UP (ref 0–0.5)
EOSINOPHIL NFR BLD AUTO: 0.6 % — SIGNIFICANT CHANGE UP (ref 0–6)
GLUCOSE SERPL-MCNC: 98 MG/DL — SIGNIFICANT CHANGE UP (ref 70–99)
HCT VFR BLD CALC: 37 % — LOW (ref 39–50)
HGB BLD-MCNC: 12.4 G/DL — LOW (ref 13–17)
IMM GRANULOCYTES NFR BLD AUTO: 0.6 % — SIGNIFICANT CHANGE UP (ref 0–1.5)
LYMPHOCYTES # BLD AUTO: 1.12 K/UL — SIGNIFICANT CHANGE UP (ref 1–3.3)
LYMPHOCYTES # BLD AUTO: 10.9 % — LOW (ref 13–44)
MAGNESIUM SERPL-MCNC: 2.3 MG/DL — SIGNIFICANT CHANGE UP (ref 1.6–2.6)
MCHC RBC-ENTMCNC: 30.1 PG — SIGNIFICANT CHANGE UP (ref 27–34)
MCHC RBC-ENTMCNC: 33.5 GM/DL — SIGNIFICANT CHANGE UP (ref 32–36)
MCV RBC AUTO: 89.8 FL — SIGNIFICANT CHANGE UP (ref 80–100)
MONOCYTES # BLD AUTO: 1.02 K/UL — HIGH (ref 0–0.9)
MONOCYTES NFR BLD AUTO: 9.9 % — SIGNIFICANT CHANGE UP (ref 2–14)
NEUTROPHILS # BLD AUTO: 8.01 K/UL — HIGH (ref 1.8–7.4)
NEUTROPHILS NFR BLD AUTO: 77.7 % — HIGH (ref 43–77)
NRBC # BLD: 0 /100 WBCS — SIGNIFICANT CHANGE UP (ref 0–0)
PLATELET # BLD AUTO: 182 K/UL — SIGNIFICANT CHANGE UP (ref 150–400)
POTASSIUM SERPL-MCNC: 3.5 MMOL/L — SIGNIFICANT CHANGE UP (ref 3.5–5.3)
POTASSIUM SERPL-SCNC: 3.5 MMOL/L — SIGNIFICANT CHANGE UP (ref 3.5–5.3)
PROT SERPL-MCNC: 6.2 G/DL — SIGNIFICANT CHANGE UP (ref 6–8.3)
RBC # BLD: 4.12 M/UL — LOW (ref 4.2–5.8)
RBC # FLD: 14.1 % — SIGNIFICANT CHANGE UP (ref 10.3–14.5)
SODIUM SERPL-SCNC: 141 MMOL/L — SIGNIFICANT CHANGE UP (ref 135–145)
SPECIMEN SOURCE: SIGNIFICANT CHANGE UP
WBC # BLD: 10.3 K/UL — SIGNIFICANT CHANGE UP (ref 3.8–10.5)
WBC # FLD AUTO: 10.3 K/UL — SIGNIFICANT CHANGE UP (ref 3.8–10.5)

## 2019-05-28 PROCEDURE — 99232 SBSQ HOSP IP/OBS MODERATE 35: CPT

## 2019-05-28 RX ORDER — OXYCODONE AND ACETAMINOPHEN 5; 325 MG/1; MG/1
1 TABLET ORAL EVERY 6 HOURS
Refills: 0 | Status: DISCONTINUED | OUTPATIENT
Start: 2019-05-28 | End: 2019-06-03

## 2019-05-28 RX ORDER — LOSARTAN POTASSIUM 100 MG/1
50 TABLET, FILM COATED ORAL DAILY
Refills: 0 | Status: DISCONTINUED | OUTPATIENT
Start: 2019-05-29 | End: 2019-06-03

## 2019-05-28 RX ORDER — OXYCODONE AND ACETAMINOPHEN 5; 325 MG/1; MG/1
1 TABLET ORAL ONCE
Refills: 0 | Status: DISCONTINUED | OUTPATIENT
Start: 2019-05-28 | End: 2019-05-28

## 2019-05-28 RX ADMIN — Medication 81 MILLIGRAM(S): at 12:12

## 2019-05-28 RX ADMIN — HEPARIN SODIUM 5000 UNIT(S): 5000 INJECTION INTRAVENOUS; SUBCUTANEOUS at 05:30

## 2019-05-28 RX ADMIN — Medication 50 MILLIGRAM(S): at 17:02

## 2019-05-28 RX ADMIN — FAMOTIDINE 20 MILLIGRAM(S): 10 INJECTION INTRAVENOUS at 17:02

## 2019-05-28 RX ADMIN — TAMSULOSIN HYDROCHLORIDE 0.4 MILLIGRAM(S): 0.4 CAPSULE ORAL at 21:25

## 2019-05-28 RX ADMIN — Medication 166.67 MILLIGRAM(S): at 04:49

## 2019-05-28 RX ADMIN — HEPARIN SODIUM 5000 UNIT(S): 5000 INJECTION INTRAVENOUS; SUBCUTANEOUS at 14:27

## 2019-05-28 RX ADMIN — AMPICILLIN SODIUM AND SULBACTAM SODIUM 200 GRAM(S): 250; 125 INJECTION, POWDER, FOR SUSPENSION INTRAMUSCULAR; INTRAVENOUS at 21:25

## 2019-05-28 RX ADMIN — FAMOTIDINE 20 MILLIGRAM(S): 10 INJECTION INTRAVENOUS at 05:30

## 2019-05-28 RX ADMIN — OXYCODONE AND ACETAMINOPHEN 1 TABLET(S): 5; 325 TABLET ORAL at 08:51

## 2019-05-28 RX ADMIN — AMLODIPINE BESYLATE 10 MILLIGRAM(S): 2.5 TABLET ORAL at 05:30

## 2019-05-28 RX ADMIN — AMPICILLIN SODIUM AND SULBACTAM SODIUM 200 GRAM(S): 250; 125 INJECTION, POWDER, FOR SUSPENSION INTRAMUSCULAR; INTRAVENOUS at 08:29

## 2019-05-28 RX ADMIN — Medication 50 MILLIGRAM(S): at 05:30

## 2019-05-28 RX ADMIN — AMPICILLIN SODIUM AND SULBACTAM SODIUM 200 GRAM(S): 250; 125 INJECTION, POWDER, FOR SUSPENSION INTRAMUSCULAR; INTRAVENOUS at 15:51

## 2019-05-28 RX ADMIN — Medication 48 MILLIGRAM(S): at 12:12

## 2019-05-28 RX ADMIN — ATORVASTATIN CALCIUM 10 MILLIGRAM(S): 80 TABLET, FILM COATED ORAL at 21:25

## 2019-05-28 RX ADMIN — CLOPIDOGREL BISULFATE 75 MILLIGRAM(S): 75 TABLET, FILM COATED ORAL at 12:12

## 2019-05-28 RX ADMIN — HEPARIN SODIUM 5000 UNIT(S): 5000 INJECTION INTRAVENOUS; SUBCUTANEOUS at 21:25

## 2019-05-28 NOTE — PROGRESS NOTE ADULT - PROBLEM SELECTOR PLAN 2
- on home meds losartan, amlodipine, hydralazine, metoprolol   - will continue amlodipine, hydralazine, metoprolol with hold parameters  - will hold losartan for now due to JULIET on CKD (recent BUN/CR was 16/1.32) continue amlodipine, hydralazine, metoprolol with hold parameters  as Cr improved, plan is to restart Losartan 50mg daily tomorrow  monitor BP per VS protocol

## 2019-05-28 NOTE — PROGRESS NOTE ADULT - SUBJECTIVE AND OBJECTIVE BOX
Contact Number: [phone]    Patient is a 73y old  Male who presents with a chief complaint of RLE swelling and pain (27 May 2019 16:45)      SUBJECTIVE / OVERNIGHT EVENTS: has mild medial calf pain,  but able to flex/extend knee and dorsi/plantar flex. Area of erythema by groin has improved. Denies fever, chills, CP, SOb, abd pain.  Denies calf paresthseia/numbness    MEDICATIONS  (STANDING):  amLODIPine   Tablet 10 milliGRAM(s) Oral daily  ampicillin/sulbactam  IVPB 3 Gram(s) IV Intermittent every 6 hours  aspirin enteric coated 81 milliGRAM(s) Oral daily  atorvastatin 10 milliGRAM(s) Oral at bedtime  clopidogrel Tablet 75 milliGRAM(s) Oral daily  famotidine    Tablet 20 milliGRAM(s) Oral two times a day  fenofibrate Tablet 48 milliGRAM(s) Oral daily  heparin  Injectable 5000 Unit(s) SubCutaneous every 8 hours  hydrALAZINE 50 milliGRAM(s) Oral two times a day  metoprolol succinate ER 50 milliGRAM(s) Oral daily  tamsulosin 0.4 milliGRAM(s) Oral at bedtime  vancomycin  IVPB 1250 milliGRAM(s) IV Intermittent every 12 hours    MEDICATIONS  (PRN):  acetaminophen   Tablet .. 650 milliGRAM(s) Oral every 6 hours PRN Temp greater or equal to 38C (100.4F)      Vital Signs Last 24 Hrs  T(C): 36.8 (28 May 2019 09:07), Max: 37.3 (28 May 2019 04:58)  T(F): 98.2 (28 May 2019 09:07), Max: 99.2 (28 May 2019 04:58)  HR: 75 (28 May 2019 09:07) (72 - 83)  BP: 115/69 (28 May 2019 09:07) (115/63 - 148/72)  BP(mean): --  RR: 17 (28 May 2019 09:07) (15 - 18)  SpO2: 94% (28 May 2019 09:07) (94% - 98%)  CAPILLARY BLOOD GLUCOSE      POCT Blood Glucose.: 116 mg/dL (28 May 2019 07:53)    I&O's Summary    27 May 2019 07:01  -  28 May 2019 07:00  --------------------------------------------------------  IN: 0 mL / OUT: 550 mL / NET: -550 mL      PHYSICAL EXAM:  GENERAL: NAD, well-developed  EYES: EOMI, PERRL  NECK: Supple, No JVD  CHEST/LUNG: Clear to auscultation bilaterally; No wheeze  HEART: Regular rate and rhythm; No murmurs  ABDOMEN: Soft, Nontender, Nondistended; Bowel sounds present  EXTREMITIES: RLE erythema extending from ankle to groin. Mild tenderness medial calf.  No pokilothermia, decreased sensation to touch. + LE ankle and calf edema; compartment soft  Cap refill <3 s;   vascular: 3+ pulses b/l groin, popliteal and DP/PT   PSYCH: AAOx3  NEUROLOGY: no gross sensory deficits to light touch  Muscle strength 5/5 b/l UE (handgrip and elbow flexion/ext) and LE (DF/PF/KE/KF/HF/HE)  No clonus       LABS:                        12.4   10.30 )-----------( 182      ( 28 May 2019 05:44 )             37.0         141  |  108  |  20  ----------------------------<  98  3.5   |  24  |  1.10    Ca    8.0<L>      28 May 2019 05:44  Mg     2.3         TPro  6.2  /  Alb  2.8<L>  /  TBili  0.9  /  DBili  x   /  AST  33  /  ALT  33  /  AlkPhos  55      LIVER FUNCTIONS - ( 28 May 2019 05:44 )  Alb: 2.8 g/dL / Pro: 6.2 g/dL / ALK PHOS: 55 U/L / ALT: 33 U/L / AST: 33 U/L / GGT: x                 Urinalysis Basic - ( 27 May 2019 18:40 )    Color: Yellow / Appearance: Clear / S.025 / pH: x  Gluc: x / Ketone: Trace  / Bili: Negative / Urobili: 1   Blood: x / Protein: 75 mg/dL / Nitrite: Negative   Leuk Esterase: Negative / RBC: 0-2 /HPF / WBC 0-2   Sq Epi: x / Non Sq Epi: Negative / Bacteria: Occasional          RADIOLOGY & ADDITIONAL TESTS:    Imaging Personally Reviewed:    Consultant(s) Notes Reviewed:      Care Discussed with Consultants/Other Providers: Contact Number: [phone]    Patient is a 73y old  Male who presents with a chief complaint of RLE swelling and pain (27 May 2019 16:45)      SUBJECTIVE / OVERNIGHT EVENTS: has mild medial calf pain,  but able to flex/extend knee and dorsi/plantar flex. Area of erythema by groin has improved. Denies fever, chills, CP, SOb, abd pain.  Denies calf paresthseia/numbness.    MEDICATIONS  (STANDING):  amLODIPine   Tablet 10 milliGRAM(s) Oral daily  ampicillin/sulbactam  IVPB 3 Gram(s) IV Intermittent every 6 hours  aspirin enteric coated 81 milliGRAM(s) Oral daily  atorvastatin 10 milliGRAM(s) Oral at bedtime  clopidogrel Tablet 75 milliGRAM(s) Oral daily  famotidine    Tablet 20 milliGRAM(s) Oral two times a day  fenofibrate Tablet 48 milliGRAM(s) Oral daily  heparin  Injectable 5000 Unit(s) SubCutaneous every 8 hours  hydrALAZINE 50 milliGRAM(s) Oral two times a day  metoprolol succinate ER 50 milliGRAM(s) Oral daily  tamsulosin 0.4 milliGRAM(s) Oral at bedtime  vancomycin  IVPB 1250 milliGRAM(s) IV Intermittent every 12 hours    MEDICATIONS  (PRN):  acetaminophen   Tablet .. 650 milliGRAM(s) Oral every 6 hours PRN Temp greater or equal to 38C (100.4F)      Vital Signs Last 24 Hrs  T(C): 36.8 (28 May 2019 09:07), Max: 37.3 (28 May 2019 04:58)  T(F): 98.2 (28 May 2019 09:07), Max: 99.2 (28 May 2019 04:58)  HR: 75 (28 May 2019 09:07) (72 - 83)  BP: 115/69 (28 May 2019 09:07) (115/63 - 148/72)  BP(mean): --  RR: 17 (28 May 2019 09:07) (15 - 18)  SpO2: 94% (28 May 2019 09:07) (94% - 98%)  CAPILLARY BLOOD GLUCOSE      POCT Blood Glucose.: 116 mg/dL (28 May 2019 07:53)    I&O's Summary    27 May 2019 07:01  -  28 May 2019 07:00  --------------------------------------------------------  IN: 0 mL / OUT: 550 mL / NET: -550 mL      PHYSICAL EXAM:  GENERAL: NAD, well-developed  EYES: EOMI, PERRL  NECK: Supple, No JVD  CHEST/LUNG: Clear to auscultation bilaterally; No wheeze  HEART: Regular rate and rhythm; No murmurs  ABDOMEN: Soft, Nontender, Nondistended; Bowel sounds present  EXTREMITIES: RLE erythema extending from ankle to groin. Mild tenderness medial calf.  No pokilothermia, decreased sensation to touch. + LE ankle and calf edema; compartment soft  RLE warm to touch  Cap refill <3 s;   vascular: 3+ pulses b/l groin, popliteal and DP/PT   PSYCH: AAOx3  NEUROLOGY: no gross sensory deficits to light touch  Muscle strength 5/5 b/l UE (handgrip and elbow flexion/ext) and LE (DF/PF/KE/KF/HF/HE)  No clonus       LABS:                        12.4   10.30 )-----------( 182      ( 28 May 2019 05:44 )             37.0         141  |  108  |  20  ----------------------------<  98  3.5   |  24  |  1.10    Ca    8.0<L>      28 May 2019 05:44  Mg     2.3         TPro  6.2  /  Alb  2.8<L>  /  TBili  0.9  /  DBili  x   /  AST  33  /  ALT  33  /  AlkPhos  55      LIVER FUNCTIONS - ( 28 May 2019 05:44 )  Alb: 2.8 g/dL / Pro: 6.2 g/dL / ALK PHOS: 55 U/L / ALT: 33 U/L / AST: 33 U/L / GGT: x                 Urinalysis Basic - ( 27 May 2019 18:40 )    Color: Yellow / Appearance: Clear / S.025 / pH: x  Gluc: x / Ketone: Trace  / Bili: Negative / Urobili: 1   Blood: x / Protein: 75 mg/dL / Nitrite: Negative   Leuk Esterase: Negative / RBC: 0-2 /HPF / WBC 0-2   Sq Epi: x / Non Sq Epi: Negative / Bacteria: Occasional          RADIOLOGY & ADDITIONAL TESTS:    Imaging Personally Reviewed:    Consultant(s) Notes Reviewed:      Care Discussed with Consultants/Other Providers:

## 2019-05-28 NOTE — CONSULT NOTE ADULT - PROBLEM SELECTOR RECOMMENDATION 9
adm on 5/27 with nonpurulent cellulitis of RLE improved rapidly with abx-have stopped Vanco and recommend continued Unasyn for now with potential change to Augmentin 875mg PO BID  for total of 10 days due to severity and PAD so until 5/6.

## 2019-05-28 NOTE — PROGRESS NOTE ADULT - PROBLEM SELECTOR PLAN 6
- JULIET on CKD (most recent BUN/Cr 16/1.32)  - avoid nephrotoxic medications  - resume losartan once JULIET improves JULIET resolved  - resume losartan tomorrow

## 2019-05-28 NOTE — CONSULT NOTE ADULT - PROBLEM SELECTOR RECOMMENDATION 4
as above.    Thank you for consulting us and involving us in the management of this most interesting and challenging case.     We will follow along in the care of this patient.

## 2019-05-28 NOTE — PROGRESS NOTE ADULT - PROBLEM SELECTOR PLAN 1
- cellulitis with erythema, tenderness extending from right foot to right upper inner thigh region, leukocytosis, elevated ESR, elevated procalc  - reported trauma to area appx 1 week ago while gardening   - continue vancomycin, unasyn for empiric coverage  - tylenol prn for fever  - f/u blood cultures  - f/u am labs  - right leg appeared to be very edematous/erythematous/tender to palpation, xray showed medial compartment narrowing, patient has intact distal pulses of b/l LE, sensation intact b/l, leg warm to touch, will monitor closely for signs or symptoms of compartment syndrome RLE cellulitis- on Unasyn and Vanco- ID consult placed  f/u blood cultures;    - no current signs of compartment syndrome involving calf but will need to be monitored closely- he reports no paresthesias and only mild pain. His pulses are intact and has good ROM of his RLE. RLE cellulitis- on Unasyn and Vanco- ID consult placed  f/u blood cultures;  percocet prn severe pain  - no current signs of compartment syndrome involving calf but will need to be monitored closely- he reports no paresthesias and only mild pain. His pulses are intact and has good ROM of his RLE.

## 2019-05-28 NOTE — DISCHARGE NOTE PROVIDER - NSDCCPCAREPLAN_GEN_ALL_CORE_FT
PRINCIPAL DISCHARGE DIAGNOSIS  Diagnosis: Cellulitis  Assessment and Plan of Treatment:       SECONDARY DISCHARGE DIAGNOSES  Diagnosis: HTN (hypertension)  Assessment and Plan of Treatment: please continue to follow your home regimen    Diagnosis: Hyperlipidemia  Assessment and Plan of Treatment: please continue to take your atorvastatin    Diagnosis: CAD (coronary artery disease)  Assessment and Plan of Treatment: please continue to take your aspirin and plavix PRINCIPAL DISCHARGE DIAGNOSIS  Diagnosis: Cellulitis  Assessment and Plan of Treatment: you were treated with IV Rocephin and now c/w Ceftin for 3 more days.   Elevated RLE and follow with PCP.      SECONDARY DISCHARGE DIAGNOSES  Diagnosis: HTN (hypertension)  Assessment and Plan of Treatment: please continue to follow your home regimen    Diagnosis: Hyperlipidemia  Assessment and Plan of Treatment: please continue to take your atorvastatin    Diagnosis: Type 2 diabetes mellitus  Assessment and Plan of Treatment: On diet control only. follow with PCP and monitor BS    Diagnosis: CAD (coronary artery disease)  Assessment and Plan of Treatment: please continue to take your aspirin,metoprolol  and plavix

## 2019-05-28 NOTE — PROGRESS NOTE ADULT - ASSESSMENT
Patient is a 73 male with pmhx of CAD, CKD, COPD, DM2 (diet controlled), PAD, LBBB, HTN, HLD, BPH, hyperparathyroidism, hx of etoh abuse, presented to ED with right lower extremity swelling, erythema, and tenderness for 4 days. Admit for evaluation and management of RLE cellulitis Patient is a 73 male with pmhx of CAD, CKD, COPD, DM2 (diet controlled), PAD, LBBB, HTN, HLD, BPH, hyperparathyroidism, hx of etoh abuse, presented to ED with right lower extremity cellulitis

## 2019-05-28 NOTE — DISCHARGE NOTE PROVIDER - HOSPITAL COURSE
Patient is a 73 male with pmhx of CAD, CKD, COPD, DM2 (diet controlled), PAD (s/p right groin stent), LBBB, HTN, HLD, BPH, hyperparathyroidism, hx of etoh abuse, presented to ED with right lower extremity swelling, erythema, and tenderness for 4 days. Patient was treated for RLE cellulitis with lymphangitis with Unasyn. Patient is a 73 male with pmhx of CAD, CKD, COPD, DM2 (diet controlled), PAD (s/p right groin stent), LBBB, HTN, HLD, BPH, hyperparathyroidism, hx of etoh abuse, presented to ED with right lower extremity swelling, erythema, and tenderness for 4 days. Patient stated that his symptoms started all the sudden when he woke up 4 days ago, noticed that his leg swelling extended up to his right inner thigh region, unable to ambulate without significant tenderness. Patient stated that he was gardening about 1 week ago, unsure if he scratched himself, however wife at bedside stated that patient was trimming hedges and she noticed dried blood on his right leg when he came into the house, denied any other trauma or noticed any insect bites. Reports decreased appetite for the past few days. Stated that he was told he had a temperature [facility] today, unsure of the temp. Denied any recent antibiotic use outpatient for his symptoms. Patient denies any headache, dizziness, chest pain, palpitations, sob, lai, abdominal pain, nausea, vomiting, diarrhea, dysuria, hematuria, melena, recent travel.         In the ED, patient was afebrile T99, 80 bpm, 115/63, RR 18@ 96% room air    WBC 10.99, H/H 14.2/42.3, plt 188, ESR 67    Na 138, K 3.4, BUN 26, Cr 1.5, glucose 116, GFR 45    Procalc 2.39    RLE doppler neg for dvt    R ankle xray showed Old chip fracture medial malleolus. No acute fracture dislocation or bone destruction. Soft tissue swelling. Vascular calcification.    R tibia/fibula xray showed No fracture malalignment bone destruction or unusual periosteal reaction. Mild narrowing medial compartment of the knee. Vascular calcification. Nonspecific soft tissue edema throughout the calf.    R knee xray showed No fracture dislocation. Medial compartment narrowing and juxta-articular sclerosis tibial plateau. Calcification medial collateral ligament. Vascular calcification. Small suprapatellar effusion. Nonspecific soft tissue edema.    Received 1x unasyn, 1x vancomycin in ED          Patient was treated for RLE cellulitis with lymphangitis with Unasyn and vanco. Pt was followed by ID and antibx was escalated to Ceftriaxone 2 gm daily. pt responded well and cellulitis edema and pain improving. pt ambulating well. stable for d/c and will c/w Po antibx ceftin for 3 more days.    Vital Signs Last 24 Hrs    T(C): 36.7 (03 Jun 2019 05:34), Max: 36.7 (03 Jun 2019 05:34)    T(F): 98 (03 Jun 2019 05:34), Max: 98 (03 Jun 2019 05:34)    HR: 81 (03 Jun 2019 05:34) (79 - 86)    BP: 131/89 (03 Jun 2019 05:34) (115/64 - 138/72)    BP(mean): --    RR: 18 (03 Jun 2019 05:34) (18 - 19)    SpO2: 93% (03 Jun 2019 05:34) (93% - 94%)    General: NAD,     Neurology: A&Ox3, nonfocal,  moving all extremities    Respiratory: CTA B/L    CV: RRR, S1S2, no murmurs, rubs or gallops    Abdominal: Soft, NT, ND +BS    Extremities: mild erythema and no tenderness on RLE, + peripheral pulses

## 2019-05-28 NOTE — CONSULT NOTE ADULT - ASSESSMENT
73 male with pmhx of CAD, CKD, COPD, DM2 (diet controlled), PAD (s/p right groin stent), LBBB, HTN, HLD, BPH, hyperparathyroidism, hx of etoh abuse, presented to ED with RLE cellulitis

## 2019-05-28 NOTE — DISCHARGE NOTE PROVIDER - CARE PROVIDER_API CALL
Evelin Thakur)  Internal Medicine  [address] [place] [id number]  Phone: [phone]  Fax: [phone]  Follow Up Time: 1 week

## 2019-05-29 LAB
ANION GAP SERPL CALC-SCNC: 6 MMOL/L — SIGNIFICANT CHANGE UP (ref 5–17)
BUN SERPL-MCNC: 16 MG/DL — SIGNIFICANT CHANGE UP (ref 7–23)
CALCIUM SERPL-MCNC: 8.7 MG/DL — SIGNIFICANT CHANGE UP (ref 8.5–10.1)
CHLORIDE SERPL-SCNC: 106 MMOL/L — SIGNIFICANT CHANGE UP (ref 96–108)
CO2 SERPL-SCNC: 26 MMOL/L — SIGNIFICANT CHANGE UP (ref 22–31)
CREAT SERPL-MCNC: 1.2 MG/DL — SIGNIFICANT CHANGE UP (ref 0.5–1.3)
GLUCOSE SERPL-MCNC: 113 MG/DL — HIGH (ref 70–99)
HCT VFR BLD CALC: 39.7 % — SIGNIFICANT CHANGE UP (ref 39–50)
HGB BLD-MCNC: 13.4 G/DL — SIGNIFICANT CHANGE UP (ref 13–17)
MCHC RBC-ENTMCNC: 30.1 PG — SIGNIFICANT CHANGE UP (ref 27–34)
MCHC RBC-ENTMCNC: 33.8 GM/DL — SIGNIFICANT CHANGE UP (ref 32–36)
MCV RBC AUTO: 89.2 FL — SIGNIFICANT CHANGE UP (ref 80–100)
NRBC # BLD: 0 /100 WBCS — SIGNIFICANT CHANGE UP (ref 0–0)
PLATELET # BLD AUTO: 200 K/UL — SIGNIFICANT CHANGE UP (ref 150–400)
POTASSIUM SERPL-MCNC: 3.9 MMOL/L — SIGNIFICANT CHANGE UP (ref 3.5–5.3)
POTASSIUM SERPL-SCNC: 3.9 MMOL/L — SIGNIFICANT CHANGE UP (ref 3.5–5.3)
RBC # BLD: 4.45 M/UL — SIGNIFICANT CHANGE UP (ref 4.2–5.8)
RBC # FLD: 14 % — SIGNIFICANT CHANGE UP (ref 10.3–14.5)
SODIUM SERPL-SCNC: 138 MMOL/L — SIGNIFICANT CHANGE UP (ref 135–145)
WBC # BLD: 11.58 K/UL — HIGH (ref 3.8–10.5)
WBC # FLD AUTO: 11.58 K/UL — HIGH (ref 3.8–10.5)

## 2019-05-29 PROCEDURE — 99232 SBSQ HOSP IP/OBS MODERATE 35: CPT

## 2019-05-29 RX ADMIN — ATORVASTATIN CALCIUM 10 MILLIGRAM(S): 80 TABLET, FILM COATED ORAL at 21:25

## 2019-05-29 RX ADMIN — OXYCODONE AND ACETAMINOPHEN 1 TABLET(S): 5; 325 TABLET ORAL at 13:40

## 2019-05-29 RX ADMIN — FAMOTIDINE 20 MILLIGRAM(S): 10 INJECTION INTRAVENOUS at 17:01

## 2019-05-29 RX ADMIN — Medication 48 MILLIGRAM(S): at 12:33

## 2019-05-29 RX ADMIN — Medication 50 MILLIGRAM(S): at 17:01

## 2019-05-29 RX ADMIN — AMPICILLIN SODIUM AND SULBACTAM SODIUM 200 GRAM(S): 250; 125 INJECTION, POWDER, FOR SUSPENSION INTRAMUSCULAR; INTRAVENOUS at 09:41

## 2019-05-29 RX ADMIN — Medication 50 MILLIGRAM(S): at 05:42

## 2019-05-29 RX ADMIN — AMPICILLIN SODIUM AND SULBACTAM SODIUM 200 GRAM(S): 250; 125 INJECTION, POWDER, FOR SUSPENSION INTRAMUSCULAR; INTRAVENOUS at 15:26

## 2019-05-29 RX ADMIN — CLOPIDOGREL BISULFATE 75 MILLIGRAM(S): 75 TABLET, FILM COATED ORAL at 12:32

## 2019-05-29 RX ADMIN — LOSARTAN POTASSIUM 50 MILLIGRAM(S): 100 TABLET, FILM COATED ORAL at 05:42

## 2019-05-29 RX ADMIN — HEPARIN SODIUM 5000 UNIT(S): 5000 INJECTION INTRAVENOUS; SUBCUTANEOUS at 05:42

## 2019-05-29 RX ADMIN — AMLODIPINE BESYLATE 10 MILLIGRAM(S): 2.5 TABLET ORAL at 05:43

## 2019-05-29 RX ADMIN — Medication 81 MILLIGRAM(S): at 12:32

## 2019-05-29 RX ADMIN — OXYCODONE AND ACETAMINOPHEN 1 TABLET(S): 5; 325 TABLET ORAL at 12:43

## 2019-05-29 RX ADMIN — TAMSULOSIN HYDROCHLORIDE 0.4 MILLIGRAM(S): 0.4 CAPSULE ORAL at 21:25

## 2019-05-29 RX ADMIN — FAMOTIDINE 20 MILLIGRAM(S): 10 INJECTION INTRAVENOUS at 05:42

## 2019-05-29 RX ADMIN — HEPARIN SODIUM 5000 UNIT(S): 5000 INJECTION INTRAVENOUS; SUBCUTANEOUS at 21:25

## 2019-05-29 RX ADMIN — AMPICILLIN SODIUM AND SULBACTAM SODIUM 200 GRAM(S): 250; 125 INJECTION, POWDER, FOR SUSPENSION INTRAMUSCULAR; INTRAVENOUS at 21:25

## 2019-05-29 RX ADMIN — AMPICILLIN SODIUM AND SULBACTAM SODIUM 200 GRAM(S): 250; 125 INJECTION, POWDER, FOR SUSPENSION INTRAMUSCULAR; INTRAVENOUS at 03:00

## 2019-05-29 RX ADMIN — HEPARIN SODIUM 5000 UNIT(S): 5000 INJECTION INTRAVENOUS; SUBCUTANEOUS at 13:33

## 2019-05-29 NOTE — PROGRESS NOTE ADULT - ASSESSMENT
Patient is a 73 male with pmhx of CAD, CKD, COPD, DM2 (diet controlled), PAD, LBBB, HTN, HLD, BPH, hyperparathyroidism, hx of etoh abuse, presented to ED with right lower extremity cellulitis

## 2019-05-29 NOTE — PROGRESS NOTE ADULT - PROBLEM SELECTOR PLAN 1
improving  blood cx neg  afeb  cont unasyn  keep elevated  hope to switch to po in 24-48 hours improving  blood cx neg  afeb  cont unasyn  keep elevated  hope to switch to po in 24-48 hours  augmentin 875 mg po bid til 6/6

## 2019-05-29 NOTE — PROGRESS NOTE ADULT - SUBJECTIVE AND OBJECTIVE BOX
[facility], Division of Infectious Diseases  AVINASH Harvey A. Lee  [phone]    Name: CABRERA PIERRE  Age: 73y  Gender: Male  MRN: [id number]    Interval History--  Notes reviewed  rle better   no events    Past Medical History--  Enlarged prostate with lower urinary tract symptoms (LUTS)  PVD (peripheral vascular disease)  LBBB (left bundle branch block)  Type 2 diabetes mellitus  CAD (coronary artery disease)  Hyperparathyroidism  Obese  Elevated LFTs  CKD (chronic kidney disease)  COPD (chronic obstructive pulmonary disease)  Pre-diabetes  PAD (peripheral artery disease)  Asthma  Hyperlipidemia  HTN (hypertension)  S/P tonsillectomy  No significant past surgical history      For details regarding the patient's social history, family history, and other miscellaneous elements, please refer the initial infectious diseases consultation and/or the admitting history and physical examination for this admission.    Allergies    No Known Allergies    Intolerances        Medications--  Antibiotics:  ampicillin/sulbactam  IVPB 3 Gram(s) IV Intermittent every 6 hours    Immunologic:    Other:  amLODIPine   Tablet  aspirin enteric coated  atorvastatin  clopidogrel Tablet  famotidine    Tablet  fenofibrate Tablet  heparin  Injectable  hydrALAZINE  losartan  metoprolol succinate ER  oxyCODONE    5 mG/acetaminophen 325 mG PRN  tamsulosin      Review of Systems--  A 10-point review of systems was obtained.     Pertinent positives and negatives--  Constitutional: No fevers. No Chills. No Rigors.   Cardiovascular: No chest pain. No palpitations.  Respiratory: No shortness of breath. No cough.  Gastrointestinal: No nausea or vomiting. No diarrhea or constipation.   Psychiatric:no anxiety    Review of systems otherwise negative except as previously noted.    Physical Examination--  Vital Signs: T(F): 97.1 (05-29-19 @ 13:41), Max: 100.2 (05-29-19 @ 04:43)  HR: 83 (05-29-19 @ 13:41)  BP: 152/73 (05-29-19 @ 13:41)  RR: 17 (05-29-19 @ 13:41)  SpO2: 92% (05-29-19 @ 13:41)  Wt(kg): --  General: Nontoxic-appearing Male in no acute distress.  HEENT: AT/NC. . Anicteric. Conjunctiva pink and moist. Oropharynx clear. Dentition fair.  Neck: Not rigid. No sense of mass.  Nodes: None palpable.  Lungs: Clear bilaterally without rales, wheezing or rhonchi  Heart: Regular rate and rhythm. No Murmur. No rub. No gallop. No palpable thrill.  Abdomen: Bowel sounds present and normoactive. Soft. Nondistended. Nontender.   Extremities: No cyanosis or clubbing. rle decreased erythema decreased edema, less warm  no purulence   Skin: Warm. Dry. Good turgor. No rash. No vasculitic stigmata.  Psychiatric: Appropriate affect and mood for situation.         Laboratory Studies--  CBC                        13.4   11.58 )-----------( 200      ( 29 May 2019 08:14 )             39.7       Chemistries  05-29    138  |  106  |  16  ----------------------------<  113<H>  3.9   |  26  |  1.20    Ca    8.7      29 May 2019 08:14  Mg     2.3     05-28    TPro  6.2  /  Alb  2.8<L>  /  TBili  0.9  /  DBili  x   /  AST  33  /  ALT  33  /  AlkPhos  55  05-28      Culture Data    Culture - Urine (collected 28 May 2019 03:24)  Source: .Urine Clean Catch (Midstream)  Final Report (28 May 2019 21:07):    No growth    Culture - Blood (collected 27 May 2019 23:12)  Source: .Blood Blood-Venous  Preliminary Report (29 May 2019 01:05):    No growth to date.    Culture - Blood (collected 27 May 2019 23:12)  Source: .Blood Blood-Venous  Preliminary Report (29 May 2019 01:05):    No growth to date.

## 2019-05-29 NOTE — PROGRESS NOTE ADULT - SUBJECTIVE AND OBJECTIVE BOX
Contact Number: [phone]    Patient is a 73y old  Male who presents with a chief complaint of RLE swelling and pain (28 May 2019 18:45)      SUBJECTIVE / OVERNIGHT EVENTS: still with RLE pain below knee, but pain above knee to groin has resolved as has the erythema. Denies fever, chills, CP, SOB, abd pain, n/v. Denies paresthesias, numbness. Able to walk but with some difficulty due to pain.    MEDICATIONS  (STANDING):  amLODIPine   Tablet 10 milliGRAM(s) Oral daily  ampicillin/sulbactam  IVPB 3 Gram(s) IV Intermittent every 6 hours  aspirin enteric coated 81 milliGRAM(s) Oral daily  atorvastatin 10 milliGRAM(s) Oral at bedtime  clopidogrel Tablet 75 milliGRAM(s) Oral daily  famotidine    Tablet 20 milliGRAM(s) Oral two times a day  fenofibrate Tablet 48 milliGRAM(s) Oral daily  heparin  Injectable 5000 Unit(s) SubCutaneous every 8 hours  hydrALAZINE 50 milliGRAM(s) Oral two times a day  losartan 50 milliGRAM(s) Oral daily  metoprolol succinate ER 50 milliGRAM(s) Oral daily  tamsulosin 0.4 milliGRAM(s) Oral at bedtime    MEDICATIONS  (PRN):  oxyCODONE    5 mG/acetaminophen 325 mG 1 Tablet(s) Oral every 6 hours PRN Severe Pain (7 - 10)      Vital Signs Last 24 Hrs  T(C): 37.6 (29 May 2019 07:36), Max: 37.9 (29 May 2019 04:43)  T(F): 99.7 (29 May 2019 07:36), Max: 100.2 (29 May 2019 04:43)  HR: 81 (29 May 2019 04:43) (68 - 81)  BP: 123/65 (29 May 2019 04:43) (113/67 - 141/70)  BP(mean): --  RR: 17 (29 May 2019 04:43) (16 - 17)  SpO2: 90% (29 May 2019 04:43) (90% - 95%)  CAPILLARY BLOOD GLUCOSE        I&O's Summary    28 May 2019 07:01  -  29 May 2019 07:00  --------------------------------------------------------  IN: 440 mL / OUT: 0 mL / NET: 440 mL        PHYSICAL EXAM:  GENERAL: NAD, well-developed  EYES: EOMI, PERRLA, conjunctiva and sclera clear  NECK: Supple, No JVD  CHEST/LUNG: Clear to auscultation bilaterally; No wheeze  HEART: Regular rate and rhythm; No murmurs  ABDOMEN: Soft, Nontender, Nondistended; Bowel sounds present  EXTREMITIES:  erythema from groin to knee has resolved; + erythema and edema from below knee to ankle  no pokilothermia; sensation to light touch intact; cap refill <3s;   compartment soft  Vascular 3+ pulses b/l groin, popliteal ,DP/PT  PSYCH: AAOx3  NEUROLOGY: no gross sensory deficits to light touch  Muscle strength 5/5 b/l UE (handgrip and elbow flexion/ext) and LE (DF/PF/KE/KF/HF/HE)  No clonus   Speech: fluent      LABS:                        12.4   10.30 )-----------( 182      ( 28 May 2019 05:44 )             37.0         141  |  108  |  20  ----------------------------<  98  3.5   |  24  |  1.10    Ca    8.0<L>      28 May 2019 05:44  Mg     2.3         TPro  6.2  /  Alb  2.8<L>  /  TBili  0.9  /  DBili  x   /  AST  33  /  ALT  33  /  AlkPhos  55      LIVER FUNCTIONS - ( 28 May 2019 05:44 )  Alb: 2.8 g/dL / Pro: 6.2 g/dL / ALK PHOS: 55 U/L / ALT: 33 U/L / AST: 33 U/L / GGT: x                 Urinalysis Basic - ( 27 May 2019 18:40 )    Color: Yellow / Appearance: Clear / S.025 / pH: x  Gluc: x / Ketone: Trace  / Bili: Negative / Urobili: 1   Blood: x / Protein: 75 mg/dL / Nitrite: Negative   Leuk Esterase: Negative / RBC: 0-2 /HPF / WBC 0-2   Sq Epi: x / Non Sq Epi: Negative / Bacteria: Occasional          Culture - Urine (collected 28 May 2019 03:24)  Source: .Urine Clean Catch (Midstream)  Final Report (28 May 2019 21:07):    No growth    Culture - Blood (collected 27 May 2019 23:12)  Source: .Blood Blood-Venous  Preliminary Report (29 May 2019 01:05):    No growth to date.    Culture - Blood (collected 27 May 2019 23:12)  Source: .Blood Blood-Venous  Preliminary Report (29 May 2019 01:05):    No growth to date.      RADIOLOGY & ADDITIONAL TESTS:    Imaging Personally Reviewed:    Consultant(s) Notes Reviewed:      Care Discussed with Consultants/Other Providers:

## 2019-05-29 NOTE — PROGRESS NOTE ADULT - PROBLEM SELECTOR PLAN 2
continue amlodipine, hydralazine, metoprolol with hold parameters  Losartan restarted today  monitor BP per VS protocol

## 2019-05-29 NOTE — PROGRESS NOTE ADULT - PROBLEM SELECTOR PLAN 1
RLE cellulitis- still  blood cultures negative; encouraged leg elevation while in bed or when sitting in chair  percocet prn severe pain  - no current signs of compartment syndrome involving calf but will need to be monitored closely- he reports no paresthesias. His pulses are intact and has good ROM and muscle strength of his RLE. He reports some pain, but is not worse compared to previous exam RLE cellulitis- erythema above knee has resolved, but still with erythema and edema involving area between ankle to knee.; c/w IV unasyn  blood cultures negative; encouraged leg elevation while in bed or when sitting in chair  percocet prn severe pain  - no current signs of compartment syndrome involving calf but will need to be monitored closely- he reports no paresthesias. His pulses are intact and has good ROM and muscle strength of his RLE. He reports some pain, but is not worse compared to previous exam RLE cellulitis- erythema above knee has resolved, but still with erythema and edema involving area between ankle to knee.; c/w IV unasyn  blood cultures negative; encouraged leg elevation while in bed or when sitting in chair  percocet prn severe pain  - no current signs of compartment syndrome involving calf but will need to be monitored closely- he reports no paresthesias. His pulses are intact and has good ROM and muscle strength of his RLE. He reports some pain, but is not worse compared to previous exam  monitor leukocytosis (slight increase today) RLE cellulitis- erythema above knee has resolved, but still with erythema and edema involving area between ankle to knee.; c/w IV unasyn  blood cultures negative; encouraged leg elevation while in bed or when sitting in chair  percocet prn severe pain  - no current signs of compartment syndrome involving calf but will need to be monitored closely- he reports no paresthesias. His pulses are intact and has good ROM and muscle strength of his RLE. He reports some pain, but is not worse compared to previous exam  monitor leukocytosis (slight increase today)- will repeat procalcitonin for the morning

## 2019-05-30 LAB
ANION GAP SERPL CALC-SCNC: 4 MMOL/L — LOW (ref 5–17)
BUN SERPL-MCNC: 16 MG/DL — SIGNIFICANT CHANGE UP (ref 7–23)
CALCIUM SERPL-MCNC: 8.5 MG/DL — SIGNIFICANT CHANGE UP (ref 8.5–10.1)
CHLORIDE SERPL-SCNC: 107 MMOL/L — SIGNIFICANT CHANGE UP (ref 96–108)
CO2 SERPL-SCNC: 28 MMOL/L — SIGNIFICANT CHANGE UP (ref 22–31)
CREAT SERPL-MCNC: 1.2 MG/DL — SIGNIFICANT CHANGE UP (ref 0.5–1.3)
GLUCOSE SERPL-MCNC: 121 MG/DL — HIGH (ref 70–99)
HCT VFR BLD CALC: 38.7 % — LOW (ref 39–50)
HGB BLD-MCNC: 12.7 G/DL — LOW (ref 13–17)
MCHC RBC-ENTMCNC: 29.5 PG — SIGNIFICANT CHANGE UP (ref 27–34)
MCHC RBC-ENTMCNC: 32.8 GM/DL — SIGNIFICANT CHANGE UP (ref 32–36)
MCV RBC AUTO: 90 FL — SIGNIFICANT CHANGE UP (ref 80–100)
NRBC # BLD: 0 /100 WBCS — SIGNIFICANT CHANGE UP (ref 0–0)
PLATELET # BLD AUTO: 205 K/UL — SIGNIFICANT CHANGE UP (ref 150–400)
POTASSIUM SERPL-MCNC: 4.6 MMOL/L — SIGNIFICANT CHANGE UP (ref 3.5–5.3)
POTASSIUM SERPL-SCNC: 4.6 MMOL/L — SIGNIFICANT CHANGE UP (ref 3.5–5.3)
PROCALCITONIN SERPL-MCNC: 0.57 NG/ML — HIGH (ref 0–0.04)
RBC # BLD: 4.3 M/UL — SIGNIFICANT CHANGE UP (ref 4.2–5.8)
RBC # FLD: 14.1 % — SIGNIFICANT CHANGE UP (ref 10.3–14.5)
SODIUM SERPL-SCNC: 139 MMOL/L — SIGNIFICANT CHANGE UP (ref 135–145)
WBC # BLD: 13.42 K/UL — HIGH (ref 3.8–10.5)
WBC # FLD AUTO: 13.42 K/UL — HIGH (ref 3.8–10.5)

## 2019-05-30 PROCEDURE — 99233 SBSQ HOSP IP/OBS HIGH 50: CPT

## 2019-05-30 RX ORDER — ACETAMINOPHEN 500 MG
650 TABLET ORAL ONCE
Refills: 0 | Status: COMPLETED | OUTPATIENT
Start: 2019-05-30 | End: 2019-05-30

## 2019-05-30 RX ORDER — CEFTRIAXONE 500 MG/1
2 INJECTION, POWDER, FOR SOLUTION INTRAMUSCULAR; INTRAVENOUS EVERY 24 HOURS
Refills: 0 | Status: DISCONTINUED | OUTPATIENT
Start: 2019-05-30 | End: 2019-06-03

## 2019-05-30 RX ADMIN — Medication 50 MILLIGRAM(S): at 05:44

## 2019-05-30 RX ADMIN — LOSARTAN POTASSIUM 50 MILLIGRAM(S): 100 TABLET, FILM COATED ORAL at 05:44

## 2019-05-30 RX ADMIN — TAMSULOSIN HYDROCHLORIDE 0.4 MILLIGRAM(S): 0.4 CAPSULE ORAL at 21:48

## 2019-05-30 RX ADMIN — Medication 81 MILLIGRAM(S): at 11:08

## 2019-05-30 RX ADMIN — OXYCODONE AND ACETAMINOPHEN 1 TABLET(S): 5; 325 TABLET ORAL at 15:36

## 2019-05-30 RX ADMIN — CEFTRIAXONE 100 GRAM(S): 500 INJECTION, POWDER, FOR SOLUTION INTRAMUSCULAR; INTRAVENOUS at 12:08

## 2019-05-30 RX ADMIN — Medication 48 MILLIGRAM(S): at 11:08

## 2019-05-30 RX ADMIN — HEPARIN SODIUM 5000 UNIT(S): 5000 INJECTION INTRAVENOUS; SUBCUTANEOUS at 05:44

## 2019-05-30 RX ADMIN — HEPARIN SODIUM 5000 UNIT(S): 5000 INJECTION INTRAVENOUS; SUBCUTANEOUS at 21:48

## 2019-05-30 RX ADMIN — Medication 50 MILLIGRAM(S): at 17:15

## 2019-05-30 RX ADMIN — AMPICILLIN SODIUM AND SULBACTAM SODIUM 200 GRAM(S): 250; 125 INJECTION, POWDER, FOR SUSPENSION INTRAMUSCULAR; INTRAVENOUS at 03:25

## 2019-05-30 RX ADMIN — Medication 650 MILLIGRAM(S): at 07:15

## 2019-05-30 RX ADMIN — AMPICILLIN SODIUM AND SULBACTAM SODIUM 200 GRAM(S): 250; 125 INJECTION, POWDER, FOR SUSPENSION INTRAMUSCULAR; INTRAVENOUS at 09:18

## 2019-05-30 RX ADMIN — ATORVASTATIN CALCIUM 10 MILLIGRAM(S): 80 TABLET, FILM COATED ORAL at 21:48

## 2019-05-30 RX ADMIN — CLOPIDOGREL BISULFATE 75 MILLIGRAM(S): 75 TABLET, FILM COATED ORAL at 11:08

## 2019-05-30 RX ADMIN — AMLODIPINE BESYLATE 10 MILLIGRAM(S): 2.5 TABLET ORAL at 05:44

## 2019-05-30 RX ADMIN — Medication 650 MILLIGRAM(S): at 06:57

## 2019-05-30 RX ADMIN — FAMOTIDINE 20 MILLIGRAM(S): 10 INJECTION INTRAVENOUS at 05:44

## 2019-05-30 RX ADMIN — OXYCODONE AND ACETAMINOPHEN 1 TABLET(S): 5; 325 TABLET ORAL at 14:33

## 2019-05-30 RX ADMIN — HEPARIN SODIUM 5000 UNIT(S): 5000 INJECTION INTRAVENOUS; SUBCUTANEOUS at 14:35

## 2019-05-30 RX ADMIN — FAMOTIDINE 20 MILLIGRAM(S): 10 INJECTION INTRAVENOUS at 17:15

## 2019-05-30 NOTE — PROGRESS NOTE ADULT - PROBLEM SELECTOR PLAN 1
not improving as quickly as I would anticipate   noted patient's body habitus, PAD, DM-2 having impact on rate of improvement but also need to consider whether abx are suboptimal as with LE cellulitis there is a percent of the time that GNRs are involved. Will 'escalate' to ceftriaxone 2 grams IV Q-day with potential then to complete course with cefuroxime 500mg PO BID until 6/6.

## 2019-05-30 NOTE — PROGRESS NOTE ADULT - SUBJECTIVE AND OBJECTIVE BOX
infectious diseases progress note:    CABRERA PIERRE is a 73y y. o. Male patient    Patient reports: leg is definitely getting better    ROS:    EYES:  Negative  blurry vision or double vision  GASTROINTESTINAL:  Negative for nausea, vomiting, diarrhea  -otherwise negative except for subjective    Allergies    No Known Allergies    Intolerances        ANTIBIOTICS/RELEVANT:  antimicrobials  ampicillin/sulbactam  IVPB 3 Gram(s) IV Intermittent every 6 hours    immunologic:    OTHER:  amLODIPine   Tablet 10 milliGRAM(s) Oral daily  aspirin enteric coated 81 milliGRAM(s) Oral daily  atorvastatin 10 milliGRAM(s) Oral at bedtime  clopidogrel Tablet 75 milliGRAM(s) Oral daily  famotidine    Tablet 20 milliGRAM(s) Oral two times a day  fenofibrate Tablet 48 milliGRAM(s) Oral daily  heparin  Injectable 5000 Unit(s) SubCutaneous every 8 hours  hydrALAZINE 50 milliGRAM(s) Oral two times a day  losartan 50 milliGRAM(s) Oral daily  metoprolol succinate ER 50 milliGRAM(s) Oral daily  oxyCODONE    5 mG/acetaminophen 325 mG 1 Tablet(s) Oral every 6 hours PRN  tamsulosin 0.4 milliGRAM(s) Oral at bedtime      Objective:  Vital Signs Last 24 Hrs  T(C): 38.1 (30 May 2019 04:48), Max: 38.1 (30 May 2019 04:48)  T(F): 100.5 (30 May 2019 04:48), Max: 100.5 (30 May 2019 04:48)  HR: 75 (30 May 2019 04:48) (72 - 83)  BP: 124/64 (30 May 2019 04:48) (114/67 - 152/73)  BP(mean): --  RR: 17 (30 May 2019 04:48) (16 - 17)  SpO2: 91% (30 May 2019 04:48) (91% - 96%)    T(C): 38.1 (05-30-19 @ 04:48), Max: 38.1 (05-30-19 @ 04:48)  T(C): 38.1 (05-30-19 @ 04:48), Max: 38.1 (05-30-19 @ 04:48)  T(C): 38.1 (05-30-19 @ 04:48), Max: 38.1 (05-30-19 @ 04:48)    PHYSICAL EXAM:  Constitutional: Well-developed, well nourished  Eyes: PERRLA, EOMI  Ear/Nose/Throat: oropharynx normal	  Neck: no JVD, no lymphadenopathy, supple  Respiratory: no accessory muscle use  Cardiovascular: RRR,   Gastrointestinal: soft, NT  Extremities: no clubbing, no cyanosis, RLE with reduced erythema, swelling but       LABS:                        12.7   13.42 )-----------( 205      ( 30 May 2019 08:44 )             38.7       13.42 05-30 @ 08:44  11.58 05-29 @ 08:14  10.30 05-28 @ 05:44  10.99 05-27 @ 13:46      05-30    139  |  107  |  16  ----------------------------<  121<H>  4.6   |  28  |  1.20    Ca    8.5      30 May 2019 08:44        Creatinine, Serum: 1.20 mg/dL (05-30-19 @ 08:44)  Creatinine, Serum: 1.20 mg/dL (05-29-19 @ 08:14)  Creatinine, Serum: 1.10 mg/dL (05-28-19 @ 05:44)  Creatinine, Serum: 1.50 mg/dL (05-27-19 @ 13:46)                MICROBIOLOGY:              RADIOLOGY & ADDITIONAL STUDIES:

## 2019-05-30 NOTE — PROGRESS NOTE ADULT - SUBJECTIVE AND OBJECTIVE BOX
Contact Number: [phone]    Patient is a 73y old  Male who presents with a chief complaint of RLE swelling and pain (28 May 2019 18:45)      SUBJECTIVE / OVERNIGHT EVENTS: Pt seen and examined bedside, has no complaints. still with RLE pain below knee, but pain above knee to groin has resolved as has the erythema. Denies fever, chills, CP, SOB, abd pain, n/v. Denies paresthesias, numbness. Able to walk but with some difficulty due to pain.    MEDICATIONS  (STANDING):  amLODIPine   Tablet 10 milliGRAM(s) Oral daily  aspirin enteric coated 81 milliGRAM(s) Oral daily  atorvastatin 10 milliGRAM(s) Oral at bedtime  cefTRIAXone   IVPB 2 Gram(s) IV Intermittent every 24 hours  clopidogrel Tablet 75 milliGRAM(s) Oral daily  famotidine    Tablet 20 milliGRAM(s) Oral two times a day  fenofibrate Tablet 48 milliGRAM(s) Oral daily  heparin  Injectable 5000 Unit(s) SubCutaneous every 8 hours  hydrALAZINE 50 milliGRAM(s) Oral two times a day  losartan 50 milliGRAM(s) Oral daily  metoprolol succinate ER 50 milliGRAM(s) Oral daily  tamsulosin 0.4 milliGRAM(s) Oral at bedtime    MEDICATIONS  (PRN):  oxyCODONE    5 mG/acetaminophen 325 mG 1 Tablet(s) Oral every 6 hours PRN Severe Pain (7 - 10)      Vital Signs Last 24 Hrs  T(C): 38.1 (30 May 2019 04:48), Max: 38.1 (30 May 2019 04:48)  T(F): 100.5 (30 May 2019 04:48), Max: 100.5 (30 May 2019 04:48)  HR: 75 (30 May 2019 04:48) (72 - 83)  BP: 124/64 (30 May 2019 04:48) (114/67 - 152/73)  BP(mean): --  RR: 17 (30 May 2019 04:48) (16 - 17)  SpO2: 91% (30 May 2019 04:48) (91% - 96%)      I&O's Summary    28 May 2019 07:01  -  29 May 2019 07:00  --------------------------------------------------------  IN: 440 mL / OUT: 0 mL / NET: 440 mL        PHYSICAL EXAM:  GENERAL: NAD, well-developed  EYES: EOMI, PERRLA, conjunctiva and sclera clear  NECK: Supple, No JVD  CHEST/LUNG: Clear to auscultation bilaterally; No wheeze  HEART: Regular rate and rhythm; No murmurs  ABDOMEN: Soft, Nontender, Nondistended; Bowel sounds present  EXTREMITIES:  erythema from groin to knee has resolved; + erythema and edema from below knee to ankle  no pokilothermia; sensation to light touch intact; cap refill <3s;   compartment soft  Vascular 3+ pulses b/l groin, popliteal ,DP/PT  PSYCH: AAOx3  NEUROLOGY: no gross sensory deficits to light touch  Muscle strength 5/5 b/l UE (handgrip and elbow flexion/ext) and LE (DF/PF/KE/KF/HF/HE)  No clonus   Speech: fluent      LABS:                        12.7   13.42 )-----------( 205      ( 30 May 2019 08:44 )             38.7     30 May 2019 08:44    139    |  107    |  16     ----------------------------<  121    4.6     |  28     |  1.20     Ca    8.5        30 May 2019 08:44          CAPILLARY BLOOD GLUCOSE      POCT Blood Glucose.: 114 mg/dL (29 May 2019 12:18)    UCx       RADIOLOGY & ADDITIONAL TESTS:          Urinalysis Basic - ( 27 May 2019 18:40 )    Color: Yellow / Appearance: Clear / S.025 / pH: x  Gluc: x / Ketone: Trace  / Bili: Negative / Urobili: 1   Blood: x / Protein: 75 mg/dL / Nitrite: Negative   Leuk Esterase: Negative / RBC: 0-2 /HPF / WBC 0-2   Sq Epi: x / Non Sq Epi: Negative / Bacteria: Occasional          Culture - Urine (collected 28 May 2019 03:24)  Source: .Urine Clean Catch (Midstream)  Final Report (28 May 2019 21:07):    No growth    Culture - Blood (collected 27 May 2019 23:12)  Source: .Blood Blood-Venous  Preliminary Report (29 May 2019 01:05):    No growth to date.    Culture - Blood (collected 27 May 2019 23:12)  Source: .Blood Blood-Venous  Preliminary Report (29 May 2019 01:05):    No growth to date.      RADIOLOGY & ADDITIONAL TESTS:    Imaging Personally Reviewed:    Consultant(s) Notes Reviewed:      Care Discussed with Consultants/Other Providers:

## 2019-05-30 NOTE — PROGRESS NOTE ADULT - PROBLEM SELECTOR PLAN 1
RLE cellulitis- erythema above knee has resolved, but still with erythema and edema involving area between ankle to knee.; ID f/u appreciated and will escalate antibx to ceftriaxone 2gm daily  blood cultures negative; encouraged leg elevation while in bed or when sitting in chair  percocet prn severe pain  no current signs of compartment syndrome involving calf but will need to be monitored closely- he reports no paresthesias. His pulses are intact and has good ROM and muscle strength of his RLE. He reports some pain, but is not worse compared to previous exam  monitor leukocytosis (slight increase today)- will repeat procalcitonin for the morning

## 2019-05-31 LAB
ANION GAP SERPL CALC-SCNC: 8 MMOL/L — SIGNIFICANT CHANGE UP (ref 5–17)
BUN SERPL-MCNC: 17 MG/DL — SIGNIFICANT CHANGE UP (ref 7–23)
CALCIUM SERPL-MCNC: 8.4 MG/DL — LOW (ref 8.5–10.1)
CHLORIDE SERPL-SCNC: 105 MMOL/L — SIGNIFICANT CHANGE UP (ref 96–108)
CO2 SERPL-SCNC: 24 MMOL/L — SIGNIFICANT CHANGE UP (ref 22–31)
CREAT SERPL-MCNC: 1.2 MG/DL — SIGNIFICANT CHANGE UP (ref 0.5–1.3)
GLUCOSE SERPL-MCNC: 179 MG/DL — HIGH (ref 70–99)
HCT VFR BLD CALC: 40.6 % — SIGNIFICANT CHANGE UP (ref 39–50)
HGB BLD-MCNC: 13.4 G/DL — SIGNIFICANT CHANGE UP (ref 13–17)
MCHC RBC-ENTMCNC: 29.5 PG — SIGNIFICANT CHANGE UP (ref 27–34)
MCHC RBC-ENTMCNC: 33 GM/DL — SIGNIFICANT CHANGE UP (ref 32–36)
MCV RBC AUTO: 89.2 FL — SIGNIFICANT CHANGE UP (ref 80–100)
NRBC # BLD: 0 /100 WBCS — SIGNIFICANT CHANGE UP (ref 0–0)
PLATELET # BLD AUTO: 303 K/UL — SIGNIFICANT CHANGE UP (ref 150–400)
POTASSIUM SERPL-MCNC: 3.6 MMOL/L — SIGNIFICANT CHANGE UP (ref 3.5–5.3)
POTASSIUM SERPL-SCNC: 3.6 MMOL/L — SIGNIFICANT CHANGE UP (ref 3.5–5.3)
RBC # BLD: 4.55 M/UL — SIGNIFICANT CHANGE UP (ref 4.2–5.8)
RBC # FLD: 14 % — SIGNIFICANT CHANGE UP (ref 10.3–14.5)
SODIUM SERPL-SCNC: 137 MMOL/L — SIGNIFICANT CHANGE UP (ref 135–145)
WBC # BLD: 13.44 K/UL — HIGH (ref 3.8–10.5)
WBC # FLD AUTO: 13.44 K/UL — HIGH (ref 3.8–10.5)

## 2019-05-31 PROCEDURE — 99233 SBSQ HOSP IP/OBS HIGH 50: CPT

## 2019-05-31 RX ADMIN — AMLODIPINE BESYLATE 10 MILLIGRAM(S): 2.5 TABLET ORAL at 06:00

## 2019-05-31 RX ADMIN — Medication 48 MILLIGRAM(S): at 12:25

## 2019-05-31 RX ADMIN — LOSARTAN POTASSIUM 50 MILLIGRAM(S): 100 TABLET, FILM COATED ORAL at 06:01

## 2019-05-31 RX ADMIN — HEPARIN SODIUM 5000 UNIT(S): 5000 INJECTION INTRAVENOUS; SUBCUTANEOUS at 13:40

## 2019-05-31 RX ADMIN — Medication 50 MILLIGRAM(S): at 06:01

## 2019-05-31 RX ADMIN — FAMOTIDINE 20 MILLIGRAM(S): 10 INJECTION INTRAVENOUS at 06:01

## 2019-05-31 RX ADMIN — CEFTRIAXONE 100 GRAM(S): 500 INJECTION, POWDER, FOR SOLUTION INTRAMUSCULAR; INTRAVENOUS at 08:46

## 2019-05-31 RX ADMIN — Medication 50 MILLIGRAM(S): at 06:00

## 2019-05-31 RX ADMIN — HEPARIN SODIUM 5000 UNIT(S): 5000 INJECTION INTRAVENOUS; SUBCUTANEOUS at 06:01

## 2019-05-31 RX ADMIN — CLOPIDOGREL BISULFATE 75 MILLIGRAM(S): 75 TABLET, FILM COATED ORAL at 12:25

## 2019-05-31 RX ADMIN — Medication 81 MILLIGRAM(S): at 12:25

## 2019-05-31 RX ADMIN — FAMOTIDINE 20 MILLIGRAM(S): 10 INJECTION INTRAVENOUS at 17:05

## 2019-05-31 RX ADMIN — HEPARIN SODIUM 5000 UNIT(S): 5000 INJECTION INTRAVENOUS; SUBCUTANEOUS at 22:06

## 2019-05-31 RX ADMIN — Medication 50 MILLIGRAM(S): at 17:05

## 2019-05-31 RX ADMIN — TAMSULOSIN HYDROCHLORIDE 0.4 MILLIGRAM(S): 0.4 CAPSULE ORAL at 22:06

## 2019-05-31 RX ADMIN — ATORVASTATIN CALCIUM 10 MILLIGRAM(S): 80 TABLET, FILM COATED ORAL at 22:06

## 2019-05-31 NOTE — PROGRESS NOTE ADULT - SUBJECTIVE AND OBJECTIVE BOX
infectious diseases progress note:    CABRERA PIERRE is a 73y y. o. Male patient    Patient reports: leg is better but very slowly improving    ROS:    EYES:  Negative  blurry vision or double vision  GASTROINTESTINAL:  Negative for nausea, vomiting, diarrhea  -otherwise negative except for subjective    Allergies    No Known Allergies    Intolerances        ANTIBIOTICS/RELEVANT:  antimicrobials  cefTRIAXone   IVPB 2 Gram(s) IV Intermittent every 24 hours    immunologic:    OTHER:  amLODIPine   Tablet 10 milliGRAM(s) Oral daily  aspirin enteric coated 81 milliGRAM(s) Oral daily  atorvastatin 10 milliGRAM(s) Oral at bedtime  clopidogrel Tablet 75 milliGRAM(s) Oral daily  famotidine    Tablet 20 milliGRAM(s) Oral two times a day  fenofibrate Tablet 48 milliGRAM(s) Oral daily  heparin  Injectable 5000 Unit(s) SubCutaneous every 8 hours  hydrALAZINE 50 milliGRAM(s) Oral two times a day  losartan 50 milliGRAM(s) Oral daily  metoprolol succinate ER 50 milliGRAM(s) Oral daily  oxyCODONE    5 mG/acetaminophen 325 mG 1 Tablet(s) Oral every 6 hours PRN  tamsulosin 0.4 milliGRAM(s) Oral at bedtime      Objective:  Vital Signs Last 24 Hrs  T(C): 37.1 (31 May 2019 05:54), Max: 37.9 (30 May 2019 14:13)  T(F): 98.8 (31 May 2019 05:54), Max: 100.2 (30 May 2019 14:13)  HR: 83 (31 May 2019 05:54) (68 - 83)  BP: 152/67 (31 May 2019 05:54) (137/64 - 152/67)  BP(mean): --  RR: 16 (31 May 2019 05:54) (16 - 17)  SpO2: 92% (31 May 2019 05:54) (92% - 92%)    T(C): 37.1 (05-31-19 @ 05:54), Max: 38.1 (05-30-19 @ 04:48)  T(C): 37.1 (05-31-19 @ 05:54), Max: 38.1 (05-30-19 @ 04:48)  T(C): 37.1 (05-31-19 @ 05:54), Max: 38.1 (05-30-19 @ 04:48)    PHYSICAL EXAM:  Constitutional: Well-developed, well nourished  Eyes: PERRLA, EOMI  Ear/Nose/Throat: oropharynx normal	  Neck: no JVD, no lymphadenopathy, supple  Respiratory: no accessory muscle use  Cardiovascular: RRR,   Gastrointestinal: soft, NT  Extremities: no clubbing, no cyanosis, RLE with edema, erythema, warmth, tender but improved from admission      LABS:                        12.7   13.42 )-----------( 205      ( 30 May 2019 08:44 )             38.7       13.42 05-30 @ 08:44  11.58 05-29 @ 08:14  10.30 05-28 @ 05:44  10.99 05-27 @ 13:46      05-30    139  |  107  |  16  ----------------------------<  121<H>  4.6   |  28  |  1.20    Ca    8.5      30 May 2019 08:44        Creatinine, Serum: 1.20 mg/dL (05-30-19 @ 08:44)  Creatinine, Serum: 1.20 mg/dL (05-29-19 @ 08:14)  Creatinine, Serum: 1.10 mg/dL (05-28-19 @ 05:44)  Creatinine, Serum: 1.50 mg/dL (05-27-19 @ 13:46)                MICROBIOLOGY:              RADIOLOGY & ADDITIONAL STUDIES:

## 2019-05-31 NOTE — PROGRESS NOTE ADULT - PROBLEM SELECTOR PLAN 1
not improving as quickly as I would anticipate   noted patient's body habitus, PAD, DM-2 having impact on rate of improvement but also need to consider whether initial abx were suboptimal as with LE cellulitis there is a percent of the time that GNRs are involved. Recommend continued ceftriaxone 2 grams IV Q-day with potential then to complete course with cefuroxime 500mg PO BID until 6/6.

## 2019-05-31 NOTE — PROGRESS NOTE ADULT - SUBJECTIVE AND OBJECTIVE BOX
Contact Number: [phone]    Patient is a 73y old  Male who presents with a chief complaint of RLE swelling and pain (28 May 2019 18:45)      SUBJECTIVE / OVERNIGHT EVENTS: Pt seen and examined bedside, has no complaints. still with RLE pain below knee, and has the erythema but pain above knee to groin has resolved . Denies fever, chills, CP, SOB, abd pain, n/v. Denies paresthesias, numbness. Able to walk but with some difficulty due to pain.    MEDICATIONS  (STANDING):  amLODIPine   Tablet 10 milliGRAM(s) Oral daily  aspirin enteric coated 81 milliGRAM(s) Oral daily  atorvastatin 10 milliGRAM(s) Oral at bedtime  cefTRIAXone   IVPB 2 Gram(s) IV Intermittent every 24 hours  clopidogrel Tablet 75 milliGRAM(s) Oral daily  famotidine    Tablet 20 milliGRAM(s) Oral two times a day  fenofibrate Tablet 48 milliGRAM(s) Oral daily  heparin  Injectable 5000 Unit(s) SubCutaneous every 8 hours  hydrALAZINE 50 milliGRAM(s) Oral two times a day  losartan 50 milliGRAM(s) Oral daily  metoprolol succinate ER 50 milliGRAM(s) Oral daily  tamsulosin 0.4 milliGRAM(s) Oral at bedtime    MEDICATIONS  (PRN):  oxyCODONE    5 mG/acetaminophen 325 mG 1 Tablet(s) Oral every 6 hours PRN Severe Pain (7 - 10)      REVIEW OF SYSTEMS  Constitutional: Fatigue; No fever, chills   Neuro: No headache, numbness, weakness  Resp:  No Cough, no wheezing; dyspnea on exertion   CVS: No chest pain, palpitations, leg swelling   GI: No abdominal pain, nausea, vomiting, diarrhea   : No dysuria, frequency, incontinence  Skin: No itching, burning, rashes, or lesions   Msk: Pain and erythema of RLE extending from knee to ankle and tenderness.  Psych: No depression, anxiety, mood swings    Vital Signs Last 24 Hrs  T(C): 37.1 (31 May 2019 05:54), Max: 37.9 (30 May 2019 14:13)  T(F): 98.8 (31 May 2019 05:54), Max: 100.2 (30 May 2019 14:13)  HR: 83 (31 May 2019 05:54) (68 - 83)  BP: 152/67 (31 May 2019 05:54) (137/64 - 152/67)  BP(mean): --  RR: 16 (31 May 2019 05:54) (16 - 17)  SpO2: 92% (31 May 2019 05:54) (92% - 92%)    PHYSICAL EXAM:  GENERAL: NAD, well-developed  EYES: EOMI, PERRLA, conjunctiva and sclera clear  NECK: Supple, No JVD  CHEST/LUNG: Clear to auscultation bilaterally; No wheeze  HEART: Regular rate and rhythm; No murmurs  ABDOMEN: Soft, Nontender, Nondistended; Bowel sounds present  EXTREMITIES:  erythema from groin to knee has resolved; + erythema and edema from below knee to ankle  no pokilothermia; sensation to light touch intact; cap refill <3s;   compartment soft  Vascular 3+ pulses b/l groin, popliteal ,DP/PT  PSYCH: AAOx3  NEUROLOGY: no gross sensory deficits to light touch  Muscle strength 5/5 b/l UE (handgrip and elbow flexion/ext) and LE (DF/PF/KE/KF/HF/HE)  No clonus   Speech: fluent    LABS:      Ca    8.5        30 May 2019 08:44          CAPILLARY BLOOD GLUCOSE        UCx       RADIOLOGY & ADDITIONAL TESTS:                Urinalysis Basic - ( 27 May 2019 18:40 )    Color: Yellow / Appearance: Clear / S.025 / pH: x  Gluc: x / Ketone: Trace  / Bili: Negative / Urobili: 1   Blood: x / Protein: 75 mg/dL / Nitrite: Negative   Leuk Esterase: Negative / RBC: 0-2 /HPF / WBC 0-2   Sq Epi: x / Non Sq Epi: Negative / Bacteria: Occasional          Culture - Urine (collected 28 May 2019 03:24)  Source: .Urine Clean Catch (Midstream)  Final Report (28 May 2019 21:07):    No growth    Culture - Blood (collected 27 May 2019 23:12)  Source: .Blood Blood-Venous  Preliminary Report (29 May 2019 01:05):    No growth to date.    Culture - Blood (collected 27 May 2019 23:12)  Source: .Blood Blood-Venous  Preliminary Report (29 May 2019 01:05):    No growth to date.      RADIOLOGY & ADDITIONAL TESTS:    Imaging Personally Reviewed:    Consultant(s) Notes Reviewed:  yes    Care Discussed with Consultants/Other Providers: yes

## 2019-05-31 NOTE — PROGRESS NOTE ADULT - PROBLEM SELECTOR PLAN 1
RLE cellulitis- erythema above knee has resolved, but still with erythema and edema involving area between ankle to knee.; ID f/u appreciated and escalated antibx to ceftriaxone 2gm daily  blood cultures negative; encouraged leg elevation while in bed or when sitting in chair  percocet prn severe pain  no current signs of compartment syndrome involving calf but will need to be monitored closely- he reports no paresthesias. His pulses are intact and has good ROM and muscle strength of his RLE. He reports some pain, but is not worse compared to previous exam  monitor leukocytosis (slight increase today)- will repeat procalcitonin for the morning

## 2019-05-31 NOTE — PROGRESS NOTE ADULT - PROBLEM SELECTOR PLAN 2
continue amlodipine, hydralazine, metoprolol with hold parameters  Losartan restarted   monitor BP per VS protocol

## 2019-06-01 LAB
ANION GAP SERPL CALC-SCNC: 9 MMOL/L — SIGNIFICANT CHANGE UP (ref 5–17)
BUN SERPL-MCNC: 18 MG/DL — SIGNIFICANT CHANGE UP (ref 7–23)
CALCIUM SERPL-MCNC: 8.3 MG/DL — LOW (ref 8.5–10.1)
CHLORIDE SERPL-SCNC: 105 MMOL/L — SIGNIFICANT CHANGE UP (ref 96–108)
CO2 SERPL-SCNC: 23 MMOL/L — SIGNIFICANT CHANGE UP (ref 22–31)
CREAT SERPL-MCNC: 1.2 MG/DL — SIGNIFICANT CHANGE UP (ref 0.5–1.3)
GLUCOSE SERPL-MCNC: 177 MG/DL — HIGH (ref 70–99)
HCT VFR BLD CALC: 42.6 % — SIGNIFICANT CHANGE UP (ref 39–50)
HGB BLD-MCNC: 14.3 G/DL — SIGNIFICANT CHANGE UP (ref 13–17)
MCHC RBC-ENTMCNC: 29.7 PG — SIGNIFICANT CHANGE UP (ref 27–34)
MCHC RBC-ENTMCNC: 33.6 GM/DL — SIGNIFICANT CHANGE UP (ref 32–36)
MCV RBC AUTO: 88.4 FL — SIGNIFICANT CHANGE UP (ref 80–100)
NRBC # BLD: 0 /100 WBCS — SIGNIFICANT CHANGE UP (ref 0–0)
PLATELET # BLD AUTO: 353 K/UL — SIGNIFICANT CHANGE UP (ref 150–400)
POTASSIUM SERPL-MCNC: 3.9 MMOL/L — SIGNIFICANT CHANGE UP (ref 3.5–5.3)
POTASSIUM SERPL-SCNC: 3.9 MMOL/L — SIGNIFICANT CHANGE UP (ref 3.5–5.3)
RBC # BLD: 4.82 M/UL — SIGNIFICANT CHANGE UP (ref 4.2–5.8)
RBC # FLD: 13.8 % — SIGNIFICANT CHANGE UP (ref 10.3–14.5)
SODIUM SERPL-SCNC: 137 MMOL/L — SIGNIFICANT CHANGE UP (ref 135–145)
WBC # BLD: 8.92 K/UL — SIGNIFICANT CHANGE UP (ref 3.8–10.5)
WBC # FLD AUTO: 8.92 K/UL — SIGNIFICANT CHANGE UP (ref 3.8–10.5)

## 2019-06-01 PROCEDURE — 99232 SBSQ HOSP IP/OBS MODERATE 35: CPT

## 2019-06-01 RX ADMIN — CLOPIDOGREL BISULFATE 75 MILLIGRAM(S): 75 TABLET, FILM COATED ORAL at 12:04

## 2019-06-01 RX ADMIN — Medication 50 MILLIGRAM(S): at 06:26

## 2019-06-01 RX ADMIN — HEPARIN SODIUM 5000 UNIT(S): 5000 INJECTION INTRAVENOUS; SUBCUTANEOUS at 21:05

## 2019-06-01 RX ADMIN — Medication 81 MILLIGRAM(S): at 12:04

## 2019-06-01 RX ADMIN — AMLODIPINE BESYLATE 10 MILLIGRAM(S): 2.5 TABLET ORAL at 06:24

## 2019-06-01 RX ADMIN — Medication 50 MILLIGRAM(S): at 06:24

## 2019-06-01 RX ADMIN — Medication 50 MILLIGRAM(S): at 17:13

## 2019-06-01 RX ADMIN — LOSARTAN POTASSIUM 50 MILLIGRAM(S): 100 TABLET, FILM COATED ORAL at 06:24

## 2019-06-01 RX ADMIN — HEPARIN SODIUM 5000 UNIT(S): 5000 INJECTION INTRAVENOUS; SUBCUTANEOUS at 13:04

## 2019-06-01 RX ADMIN — TAMSULOSIN HYDROCHLORIDE 0.4 MILLIGRAM(S): 0.4 CAPSULE ORAL at 21:05

## 2019-06-01 RX ADMIN — CEFTRIAXONE 100 GRAM(S): 500 INJECTION, POWDER, FOR SOLUTION INTRAMUSCULAR; INTRAVENOUS at 09:38

## 2019-06-01 RX ADMIN — FAMOTIDINE 20 MILLIGRAM(S): 10 INJECTION INTRAVENOUS at 06:24

## 2019-06-01 RX ADMIN — HEPARIN SODIUM 5000 UNIT(S): 5000 INJECTION INTRAVENOUS; SUBCUTANEOUS at 06:24

## 2019-06-01 RX ADMIN — ATORVASTATIN CALCIUM 10 MILLIGRAM(S): 80 TABLET, FILM COATED ORAL at 21:05

## 2019-06-01 RX ADMIN — FAMOTIDINE 20 MILLIGRAM(S): 10 INJECTION INTRAVENOUS at 17:13

## 2019-06-01 RX ADMIN — Medication 48 MILLIGRAM(S): at 12:04

## 2019-06-01 NOTE — PROGRESS NOTE ADULT - SUBJECTIVE AND OBJECTIVE BOX
Patient is a 73y old  Male who presents with a chief complaint of RLE swelling and pain (28 May 2019 18:45)      SUBJECTIVE / OVERNIGHT EVENTS: Pt seen and examined bedside, has no complaints. still with RLE pain below knee, and has the erythema but pain above knee to groin has resolved . Denies fever, chills, CP, SOB, abd pain, n/v. Denies paresthesias, numbness. Able to walk better with less pain  MEDICATIONS  (STANDING):  amLODIPine   Tablet 10 milliGRAM(s) Oral daily  aspirin enteric coated 81 milliGRAM(s) Oral daily  atorvastatin 10 milliGRAM(s) Oral at bedtime  cefTRIAXone   IVPB 2 Gram(s) IV Intermittent every 24 hours  clopidogrel Tablet 75 milliGRAM(s) Oral daily  famotidine    Tablet 20 milliGRAM(s) Oral two times a day  fenofibrate Tablet 48 milliGRAM(s) Oral daily  heparin  Injectable 5000 Unit(s) SubCutaneous every 8 hours  hydrALAZINE 50 milliGRAM(s) Oral two times a day  losartan 50 milliGRAM(s) Oral daily  metoprolol succinate ER 50 milliGRAM(s) Oral daily  tamsulosin 0.4 milliGRAM(s) Oral at bedtime    MEDICATIONS  (PRN):  oxyCODONE    5 mG/acetaminophen 325 mG 1 Tablet(s) Oral every 6 hours PRN Severe Pain (7 - 10)      REVIEW OF SYSTEMS  Constitutional: Fatigue; No fever, chills   Neuro: No headache, numbness, weakness  Resp:  No Cough, no wheezing; dyspnea on exertion   CVS: No chest pain, palpitations, leg swelling   GI: No abdominal pain, nausea, vomiting, diarrhea   : No dysuria, frequency, incontinence  Skin: No itching, burning, rashes, or lesions   Msk: Pain and erythema of RLE extending from knee to ankle and tenderness.  Psych: No depression, anxiety, mood swings    Vital Signs Last 24 Hrs  T(C): 36.9 (2019 05:03), Max: 37.3 (31 May 2019 21:39)  T(F): 98.5 (2019 05:03), Max: 99.1 (31 May 2019 21:39)  HR: 89 (2019 05:03) (85 - 89)  BP: 146/74 (2019 05:03) (125/68 - 146/74)  BP(mean): --  RR: 17 (2019 05:03) (17 - 17)  SpO2: 93% (2019 05:03) (93% - 96%)  PHYSICAL EXAM:  GENERAL: NAD, well-developed  EYES: EOMI, PERRLA, conjunctiva and sclera clear  NECK: Supple, No JVD  CHEST/LUNG: Clear to auscultation bilaterally; No wheeze  HEART: Regular rate and rhythm; No murmurs  ABDOMEN: Soft, Nontender, Nondistended; Bowel sounds present  EXTREMITIES:  erythema from groin to knee has resolved; + erythema and edema from below knee to ankle  no pokilothermia; sensation to light touch intact; cap refill <3s;   compartment soft  Vascular 3+ pulses b/l groin, popliteal ,DP/PT  PSYCH: AAOx3  NEUROLOGY: no gross sensory deficits to light touch  Muscle strength 5/5 b/l UE (handgrip and elbow flexion/ext) and LE (DF/PF/KE/KF/HF/HE)  No clonus   Speech: fluent    LABS:                        14.3   8.92  )-----------( 353      ( 2019 10:10 )             42.6     2019 10:10    137    |  105    |  18     ----------------------------<  177    3.9     |  23     |  1.20     Ca    8.3        2019 10:10          CAPILLARY BLOOD GLUCOSE      POCT Blood Glucose.: 125 mg/dL (2019 07:39)  POCT Blood Glucose.: 114 mg/dL (31 May 2019 21:31)    UCx       RADIOLOGY & ADDITIONAL TESTS:                  Urinalysis Basic - ( 27 May 2019 18:40 )    Color: Yellow / Appearance: Clear / S.025 / pH: x  Gluc: x / Ketone: Trace  / Bili: Negative / Urobili: 1   Blood: x / Protein: 75 mg/dL / Nitrite: Negative   Leuk Esterase: Negative / RBC: 0-2 /HPF / WBC 0-2   Sq Epi: x / Non Sq Epi: Negative / Bacteria: Occasional          Culture - Urine (collected 28 May 2019 03:24)  Source: .Urine Clean Catch (Midstream)  Final Report (28 May 2019 21:07):    No growth    Culture - Blood (collected 27 May 2019 23:12)  Source: .Blood Blood-Venous  Preliminary Report (29 May 2019 01:05):    No growth to date.    Culture - Blood (collected 27 May 2019 23:12)  Source: .Blood Blood-Venous  Preliminary Report (29 May 2019 01:05):    No growth to date.      RADIOLOGY & ADDITIONAL TESTS:    Imaging Personally Reviewed:    Consultant(s) Notes Reviewed:  yes    Care Discussed with Consultants/Other Providers: yes

## 2019-06-01 NOTE — PROGRESS NOTE ADULT - PROBLEM SELECTOR PLAN 1
RLE cellulitis- erythema above knee has resolved, but still with erythema and edema involving area between ankle to knee.;   ID f/u appreciated and will c/w antibx to ceftriaxone 2gm daily  blood cultures negative; encouraged leg elevation while in bed or when sitting in chair  percocet prn severe pain  no current signs of compartment syndrome involving calf but will need to be monitored closely- he reports no paresthesias. His pulses are intact and has good ROM and muscle strength of his RLE. He reports some pain, but is not worse compared to previous exam  monitor leukocytosis (slight increase today)- will repeat procalcitonin for the morning

## 2019-06-02 LAB
CULTURE RESULTS: SIGNIFICANT CHANGE UP
CULTURE RESULTS: SIGNIFICANT CHANGE UP
SPECIMEN SOURCE: SIGNIFICANT CHANGE UP
SPECIMEN SOURCE: SIGNIFICANT CHANGE UP

## 2019-06-02 PROCEDURE — 99232 SBSQ HOSP IP/OBS MODERATE 35: CPT

## 2019-06-02 RX ADMIN — FAMOTIDINE 20 MILLIGRAM(S): 10 INJECTION INTRAVENOUS at 17:26

## 2019-06-02 RX ADMIN — ATORVASTATIN CALCIUM 10 MILLIGRAM(S): 80 TABLET, FILM COATED ORAL at 22:11

## 2019-06-02 RX ADMIN — CLOPIDOGREL BISULFATE 75 MILLIGRAM(S): 75 TABLET, FILM COATED ORAL at 11:35

## 2019-06-02 RX ADMIN — Medication 50 MILLIGRAM(S): at 17:27

## 2019-06-02 RX ADMIN — TAMSULOSIN HYDROCHLORIDE 0.4 MILLIGRAM(S): 0.4 CAPSULE ORAL at 22:11

## 2019-06-02 RX ADMIN — Medication 81 MILLIGRAM(S): at 11:35

## 2019-06-02 RX ADMIN — Medication 48 MILLIGRAM(S): at 11:35

## 2019-06-02 RX ADMIN — Medication 50 MILLIGRAM(S): at 05:30

## 2019-06-02 RX ADMIN — HEPARIN SODIUM 5000 UNIT(S): 5000 INJECTION INTRAVENOUS; SUBCUTANEOUS at 22:11

## 2019-06-02 RX ADMIN — LOSARTAN POTASSIUM 50 MILLIGRAM(S): 100 TABLET, FILM COATED ORAL at 05:30

## 2019-06-02 RX ADMIN — AMLODIPINE BESYLATE 10 MILLIGRAM(S): 2.5 TABLET ORAL at 05:30

## 2019-06-02 RX ADMIN — CEFTRIAXONE 100 GRAM(S): 500 INJECTION, POWDER, FOR SOLUTION INTRAMUSCULAR; INTRAVENOUS at 09:29

## 2019-06-02 RX ADMIN — HEPARIN SODIUM 5000 UNIT(S): 5000 INJECTION INTRAVENOUS; SUBCUTANEOUS at 05:30

## 2019-06-02 RX ADMIN — HEPARIN SODIUM 5000 UNIT(S): 5000 INJECTION INTRAVENOUS; SUBCUTANEOUS at 13:09

## 2019-06-02 RX ADMIN — FAMOTIDINE 20 MILLIGRAM(S): 10 INJECTION INTRAVENOUS at 05:30

## 2019-06-02 NOTE — PROGRESS NOTE ADULT - PROBLEM SELECTOR PLAN 5
- continue asa 81

## 2019-06-02 NOTE — DIETITIAN INITIAL EVALUATION ADULT. - OTHER INFO
73 yr old male with pmhx as below admitted and being treated for R LE cellulitis. Pt reports he is eating well, offers no nutrition related complaints, states he uses sugar sub, uses little salt, admits he can be more compliant with a portion controlled low saturated fat diet. Encouragement provided for ^ diet compliance. skin- see dx, no pressure injuries. Diet should be changed to CHO controlled ( snack) DASH/TLC

## 2019-06-02 NOTE — DIETITIAN INITIAL EVALUATION ADULT. - PROBLEM SELECTOR PLAN 2
- on home meds losartan, amlodipine, hydralazine, metoprolol   - will continue amlodipine, hydralazine, metoprolol with hold parameters  - will hold losartan for now due to JULIET on CKD (recent BUN/CR was 16/1.32)

## 2019-06-02 NOTE — PROGRESS NOTE ADULT - PROBLEM SELECTOR PLAN 1
RLE cellulitis- erythema and edema, improved   ID f/u appreciated and will c/w antibx to ceftriaxone 2gm daily  blood cultures negative;   encouraged leg elevation while in bed or when sitting in chair  percocet prn severe pain  no current signs of compartment syndrome involving calf but will need to be monitored closely- he reports no paresthesias. His pulses are intact and has good ROM and muscle strength of his RLE.

## 2019-06-02 NOTE — PROGRESS NOTE ADULT - PROBLEM SELECTOR PLAN 7
- controlled, not using inhalers or O2 at home  - continue to monitor

## 2019-06-02 NOTE — PROGRESS NOTE ADULT - PROBLEM SELECTOR PROBLEM 8
Type 2 diabetes mellitus

## 2019-06-02 NOTE — PROGRESS NOTE ADULT - PROBLEM SELECTOR PLAN 9
- continue tamsulosin

## 2019-06-02 NOTE — PROGRESS NOTE ADULT - PROBLEM SELECTOR PLAN 8
- per patient controlled with diet  - consistent carb diet, no ISS at this time

## 2019-06-02 NOTE — PROGRESS NOTE ADULT - SUBJECTIVE AND OBJECTIVE BOX
Patient is a 73y old  Male who presents with a chief complaint of RLE swelling and pain (28 May 2019 18:45)      SUBJECTIVE / OVERNIGHT EVENTS: Pt seen and examined bedside, has no complaints. Improved erythema and pain, afebrile . Denies fever, chills, CP, SOB, abd pain, n/v. Denies paresthesias, numbness. Able to walk better with less pain  MEDICATIONS  (STANDING):  amLODIPine   Tablet 10 milliGRAM(s) Oral daily  aspirin enteric coated 81 milliGRAM(s) Oral daily  atorvastatin 10 milliGRAM(s) Oral at bedtime  cefTRIAXone   IVPB 2 Gram(s) IV Intermittent every 24 hours  clopidogrel Tablet 75 milliGRAM(s) Oral daily  famotidine    Tablet 20 milliGRAM(s) Oral two times a day  fenofibrate Tablet 48 milliGRAM(s) Oral daily  heparin  Injectable 5000 Unit(s) SubCutaneous every 8 hours  hydrALAZINE 50 milliGRAM(s) Oral two times a day  losartan 50 milliGRAM(s) Oral daily  metoprolol succinate ER 50 milliGRAM(s) Oral daily  tamsulosin 0.4 milliGRAM(s) Oral at bedtime    MEDICATIONS  (PRN):  oxyCODONE    5 mG/acetaminophen 325 mG 1 Tablet(s) Oral every 6 hours PRN Severe Pain (7 - 10)      REVIEW OF SYSTEMS  Constitutional: Fatigue; No fever, chills   Neuro: No headache, numbness, weakness  Resp:  No Cough, no wheezing; dyspnea on exertion   CVS: No chest pain, palpitations, leg swelling   GI: No abdominal pain, nausea, vomiting, diarrhea   : No dysuria, frequency, incontinence  Skin: No itching, burning, rashes, or lesions   Msk: Pain and erythema of RLE extending from knee to ankle and tenderness.  Psych: No depression, anxiety, mood swings    Vital Signs Last 24 Hrs  T(C): 36.7 (2019 05:04), Max: 36.9 (2019 21:51)  T(F): 98 (2019 05:04), Max: 98.4 (2019 21:51)  HR: 83 (2019 05:04) (82 - 91)  BP: 127/72 (2019 05:04) (112/74 - 149/82)  BP(mean): --  RR: 19 (2019 05:04) (17 - 19)  SpO2: 94% (2019 05:04) (94% - 96%)    PHYSICAL EXAM:  GENERAL: NAD, well-developed  EYES: EOMI, PERRLA, conjunctiva and sclera clear  NECK: Supple, No JVD  CHEST/LUNG: Clear to auscultation bilaterally; No wheeze  HEART: Regular rate and rhythm; No murmurs  ABDOMEN: Soft, Nontender, Nondistended; Bowel sounds present  EXTREMITIES:  erythema from groin to knee has resolved; + erythema and edema from below knee to ankle  no pokilothermia; sensation to light touch intact; cap refill <3s;   compartment soft  Vascular 3+ pulses b/l groin, popliteal ,DP/PT  PSYCH: AAOx3  NEUROLOGY: no gross sensory deficits to light touch  Muscle strength 5/5 b/l UE (handgrip and elbow flexion/ext) and LE (DF/PF/KE/KF/HF/HE)  No clonus   Speech: fluent    LABS:                        14.3   8.92  )-----------( 353      ( 2019 10:10 )             42.6     2019 10:10    137    |  105    |  18     ----------------------------<  177    3.9     |  23     |  1.20     Ca    8.3        2019 10:10          CAPILLARY BLOOD GLUCOSE      POCT Blood Glucose.: 125 mg/dL (2019 07:39)  POCT Blood Glucose.: 114 mg/dL (31 May 2019 21:31)    UCx       RADIOLOGY & ADDITIONAL TESTS:                  Urinalysis Basic - ( 27 May 2019 18:40 )    Color: Yellow / Appearance: Clear / S.025 / pH: x  Gluc: x / Ketone: Trace  / Bili: Negative / Urobili: 1   Blood: x / Protein: 75 mg/dL / Nitrite: Negative   Leuk Esterase: Negative / RBC: 0-2 /HPF / WBC 0-2   Sq Epi: x / Non Sq Epi: Negative / Bacteria: Occasional          Culture - Urine (collected 28 May 2019 03:24)  Source: .Urine Clean Catch (Midstream)  Final Report (28 May 2019 21:07):    No growth    Culture - Blood (collected 27 May 2019 23:12)  Source: .Blood Blood-Venous  Preliminary Report (29 May 2019 01:05):    No growth to date.    Culture - Blood (collected 27 May 2019 23:12)  Source: .Blood Blood-Venous  Preliminary Report (29 May 2019 01:05):    No growth to date.      RADIOLOGY & ADDITIONAL TESTS:    Imaging Personally Reviewed:    Consultant(s) Notes Reviewed:  yes    Care Discussed with Consultants/Other Providers: yes

## 2019-06-03 ENCOUNTER — TRANSCRIPTION ENCOUNTER (OUTPATIENT)
Age: 74
End: 2019-06-03

## 2019-06-03 VITALS
RESPIRATION RATE: 18 BRPM | SYSTOLIC BLOOD PRESSURE: 131 MMHG | TEMPERATURE: 98 F | HEART RATE: 81 BPM | DIASTOLIC BLOOD PRESSURE: 89 MMHG | OXYGEN SATURATION: 93 %

## 2019-06-03 LAB
ANION GAP SERPL CALC-SCNC: 9 MMOL/L — SIGNIFICANT CHANGE UP (ref 5–17)
BUN SERPL-MCNC: 21 MG/DL — SIGNIFICANT CHANGE UP (ref 7–23)
CALCIUM SERPL-MCNC: 9 MG/DL — SIGNIFICANT CHANGE UP (ref 8.5–10.1)
CHLORIDE SERPL-SCNC: 101 MMOL/L — SIGNIFICANT CHANGE UP (ref 96–108)
CO2 SERPL-SCNC: 26 MMOL/L — SIGNIFICANT CHANGE UP (ref 22–31)
CREAT SERPL-MCNC: 1.2 MG/DL — SIGNIFICANT CHANGE UP (ref 0.5–1.3)
GLUCOSE SERPL-MCNC: 152 MG/DL — HIGH (ref 70–99)
HCT VFR BLD CALC: 46.2 % — SIGNIFICANT CHANGE UP (ref 39–50)
HGB BLD-MCNC: 15.2 G/DL — SIGNIFICANT CHANGE UP (ref 13–17)
MCHC RBC-ENTMCNC: 29.6 PG — SIGNIFICANT CHANGE UP (ref 27–34)
MCHC RBC-ENTMCNC: 32.9 GM/DL — SIGNIFICANT CHANGE UP (ref 32–36)
MCV RBC AUTO: 89.9 FL — SIGNIFICANT CHANGE UP (ref 80–100)
NRBC # BLD: 0 /100 WBCS — SIGNIFICANT CHANGE UP (ref 0–0)
PLATELET # BLD AUTO: 451 K/UL — HIGH (ref 150–400)
POTASSIUM SERPL-MCNC: 4.1 MMOL/L — SIGNIFICANT CHANGE UP (ref 3.5–5.3)
POTASSIUM SERPL-SCNC: 4.1 MMOL/L — SIGNIFICANT CHANGE UP (ref 3.5–5.3)
RBC # BLD: 5.14 M/UL — SIGNIFICANT CHANGE UP (ref 4.2–5.8)
RBC # FLD: 14 % — SIGNIFICANT CHANGE UP (ref 10.3–14.5)
SODIUM SERPL-SCNC: 136 MMOL/L — SIGNIFICANT CHANGE UP (ref 135–145)
WBC # BLD: 11.55 K/UL — HIGH (ref 3.8–10.5)
WBC # FLD AUTO: 11.55 K/UL — HIGH (ref 3.8–10.5)

## 2019-06-03 PROCEDURE — 80053 COMPREHEN METABOLIC PANEL: CPT

## 2019-06-03 PROCEDURE — 93971 EXTREMITY STUDY: CPT

## 2019-06-03 PROCEDURE — 93005 ELECTROCARDIOGRAM TRACING: CPT

## 2019-06-03 PROCEDURE — 83605 ASSAY OF LACTIC ACID: CPT

## 2019-06-03 PROCEDURE — 96365 THER/PROPH/DIAG IV INF INIT: CPT

## 2019-06-03 PROCEDURE — 99285 EMERGENCY DEPT VISIT HI MDM: CPT | Mod: 25

## 2019-06-03 PROCEDURE — 73610 X-RAY EXAM OF ANKLE: CPT

## 2019-06-03 PROCEDURE — 73590 X-RAY EXAM OF LOWER LEG: CPT

## 2019-06-03 PROCEDURE — 73562 X-RAY EXAM OF KNEE 3: CPT

## 2019-06-03 PROCEDURE — 82962 GLUCOSE BLOOD TEST: CPT

## 2019-06-03 PROCEDURE — 87086 URINE CULTURE/COLONY COUNT: CPT

## 2019-06-03 PROCEDURE — 83735 ASSAY OF MAGNESIUM: CPT

## 2019-06-03 PROCEDURE — 81001 URINALYSIS AUTO W/SCOPE: CPT

## 2019-06-03 PROCEDURE — 83690 ASSAY OF LIPASE: CPT

## 2019-06-03 PROCEDURE — 99239 HOSP IP/OBS DSCHRG MGMT >30: CPT

## 2019-06-03 PROCEDURE — 86140 C-REACTIVE PROTEIN: CPT

## 2019-06-03 PROCEDURE — 36415 COLL VENOUS BLD VENIPUNCTURE: CPT

## 2019-06-03 PROCEDURE — 85027 COMPLETE CBC AUTOMATED: CPT

## 2019-06-03 PROCEDURE — 87040 BLOOD CULTURE FOR BACTERIA: CPT

## 2019-06-03 PROCEDURE — 84145 PROCALCITONIN (PCT): CPT

## 2019-06-03 PROCEDURE — 80048 BASIC METABOLIC PNL TOTAL CA: CPT

## 2019-06-03 PROCEDURE — 85652 RBC SED RATE AUTOMATED: CPT

## 2019-06-03 RX ORDER — CEFUROXIME AXETIL 250 MG
1 TABLET ORAL
Qty: 6 | Refills: 0
Start: 2019-06-03 | End: 2019-06-05

## 2019-06-03 RX ADMIN — AMLODIPINE BESYLATE 10 MILLIGRAM(S): 2.5 TABLET ORAL at 05:55

## 2019-06-03 RX ADMIN — HEPARIN SODIUM 5000 UNIT(S): 5000 INJECTION INTRAVENOUS; SUBCUTANEOUS at 05:55

## 2019-06-03 RX ADMIN — CEFTRIAXONE 100 GRAM(S): 500 INJECTION, POWDER, FOR SOLUTION INTRAMUSCULAR; INTRAVENOUS at 09:02

## 2019-06-03 RX ADMIN — Medication 48 MILLIGRAM(S): at 11:17

## 2019-06-03 RX ADMIN — FAMOTIDINE 20 MILLIGRAM(S): 10 INJECTION INTRAVENOUS at 05:55

## 2019-06-03 RX ADMIN — Medication 50 MILLIGRAM(S): at 05:55

## 2019-06-03 RX ADMIN — CLOPIDOGREL BISULFATE 75 MILLIGRAM(S): 75 TABLET, FILM COATED ORAL at 11:17

## 2019-06-03 RX ADMIN — Medication 81 MILLIGRAM(S): at 11:17

## 2019-06-03 RX ADMIN — LOSARTAN POTASSIUM 50 MILLIGRAM(S): 100 TABLET, FILM COATED ORAL at 05:55

## 2019-06-03 NOTE — PROGRESS NOTE ADULT - PROBLEM SELECTOR PROBLEM 4
PAD (peripheral artery disease)

## 2019-06-03 NOTE — DISCHARGE NOTE NURSING/CASE MANAGEMENT/SOCIAL WORK - NSDCDPATPORTLINK_GEN_ALL_CORE
You can access the [facility] Portal, offered by [facility], by registering with the following website: [email or web address]

## 2019-06-03 NOTE — PROGRESS NOTE ADULT - PROBLEM SELECTOR PLAN 3
blood sugar control   management per primary team
blood sugar control   management per primary team
- continue atorvastatin and fenofibrate
- continue atorvastatin and fenofibrate
blood sugar control   management per primary team
- continue atorvastatin and fenofibrate
blood sugar control   management per primary team
- continue atorvastatin and fenofibrate

## 2019-06-03 NOTE — PROGRESS NOTE ADULT - PROBLEM SELECTOR PROBLEM 2
CKD (chronic kidney disease)
HTN (hypertension)
CKD (chronic kidney disease)
HTN (hypertension)
CKD (chronic kidney disease)
HTN (hypertension)
CKD (chronic kidney disease)
HTN (hypertension)

## 2019-06-03 NOTE — PROGRESS NOTE ADULT - PROBLEM SELECTOR PROBLEM 3
Type 2 diabetes mellitus
Hyperlipidemia
Hyperlipidemia
Type 2 diabetes mellitus
Hyperlipidemia
Type 2 diabetes mellitus
Type 2 diabetes mellitus
Hyperlipidemia

## 2019-06-03 NOTE — PROGRESS NOTE ADULT - ATTENDING COMMENTS
ID will sign off  please call with questions    Pt can f/u in ID office in 1 week time  thank you  [phone]

## 2019-06-03 NOTE — PROGRESS NOTE ADULT - PROBLEM SELECTOR PROBLEM 1
Cellulitis

## 2019-06-03 NOTE — PROGRESS NOTE ADULT - REASON FOR ADMISSION
RLE swelling and pain

## 2019-06-03 NOTE — PROGRESS NOTE ADULT - PROBLEM SELECTOR PLAN 4
cont current management per primary team
cont current management per primary team
- continue clopidogrel   - s/p right groin stent placement appx 2-3 years ago
cont current management per primary team
cont current management per primary team    Over the weekend Dr. Anju Cuellar will be covering for our group. If you have any questions please reach out to them at [phone].
- continue clopidogrel   - s/p right groin stent placement appx 2-3 years ago

## 2019-06-03 NOTE — PROGRESS NOTE ADULT - PROBLEM SELECTOR PLAN 1
nonpurulent  overall improved, but still some edema and erthema  can now change to oral antibx complete course with cefuroxime 500mg PO BID until 6/6.  encourage ambulation  keep elevated while seated  discussed skin care and barrier protection.

## 2019-06-03 NOTE — PROGRESS NOTE ADULT - SUBJECTIVE AND OBJECTIVE BOX
[facility], Division of Infectious Diseases  AVINASH Harvey A. Lee  [phone]    Name: CABRERA PIERRE  Age: 73y  Gender: Male  MRN: [id number]    Interval History--  Notes reviewed  better, but .    Past Medical History--  Enlarged prostate with lower urinary tract symptoms (LUTS)  PVD (peripheral vascular disease)  LBBB (left bundle branch block)  Type 2 diabetes mellitus  CAD (coronary artery disease)  Hyperparathyroidism  Obese  Elevated LFTs  CKD (chronic kidney disease)  COPD (chronic obstructive pulmonary disease)  Pre-diabetes  PAD (peripheral artery disease)  Asthma  Hyperlipidemia  HTN (hypertension)  S/P tonsillectomy        For details regarding the patient's social history, family history, and other miscellaneous elements, please refer the initial infectious diseases consultation and/or the admitting history and physical examination for this admission.    Allergies    No Known Allergies    Intolerances        Medications--  Antibiotics:  cefTRIAXone   IVPB 2 Gram(s) IV Intermittent every 24 hours    Immunologic:    Other:  amLODIPine   Tablet  aspirin enteric coated  atorvastatin  clopidogrel Tablet  famotidine    Tablet  fenofibrate Tablet  heparin  Injectable  hydrALAZINE  losartan  metoprolol succinate ER  oxyCODONE    5 mG/acetaminophen 325 mG PRN  tamsulosin      Review of Systems--  A 10-point review of systems was obtained.     Pertinent positives and negatives--  Constitutional: No fevers. No Chills. No Rigors.   Cardiovascular: No chest pain. No palpitations.  Respiratory: No shortness of breath. No cough.  Gastrointestinal: No nausea or vomiting. No diarrhea or constipation.   Psychiatric: no anxiety  Review of systems otherwise negative except as previously noted.    Physical Examination--  Vital Signs: T(F): 98 (06-03-19 @ 05:34), Max: 98 (06-03-19 @ 05:34)  HR: 81 (06-03-19 @ 05:34)  BP: 131/89 (06-03-19 @ 05:34)  RR: 18 (06-03-19 @ 05:34)  SpO2: 93% (06-03-19 @ 05:34)  Wt(kg): --  General: Nontoxic-appearing Male in no acute distress.  HEENT: AT/NC. Anicteric. Conjunctiva pink and moist. Oropharynx clear. Dentition fair.  Neck: Not rigid. No sense of mass.  Nodes: None palpable.  Lungs: Clear bilaterally without rales, wheezing or rhonchi  Heart: Regular rate and rhythm. No Murmur.   Abdomen: Bowel sounds present and normoactive. Soft. Nondistended.   Extremities: No cyanosis or clubbing. rle edema +mild erythema, nonpurulent, + tender + warm  feet dry calloused, nails with some fungus  Skin: Warm. Dry. Good turgor. No rash. No vasculitic stigmata.  Psychiatric: Appropriate affect and mood for situation.         Laboratory Studies--  CBC                        15.2   11.55 )-----------( 451      ( 03 Jun 2019 09:16 )             46.2       Chemistries  06-03    136  |  101  |  21  ----------------------------<  152<H>  4.1   |  26  |  1.20    Ca    9.0      03 Jun 2019 09:16        Culture Data    Culture - Urine (collected 28 May 2019 03:24)  Source: .Urine Clean Catch (Midstream)  Final Report (28 May 2019 21:07):    No growth    Culture - Blood (collected 27 May 2019 23:12)  Source: .Blood Blood-Venous  Final Report (02 Jun 2019 01:00):    No growth at 5 days.    Culture - Blood (collected 27 May 2019 23:12)  Source: .Blood Blood-Venous  Final Report (02 Jun 2019 01:00):    No growth at 5 days.      < from: US Duplex Venous Lower Ext Ltd, Right (05.27.19 @ 15:54) >    EXAM:  US DPLX LWR EXT VEINS LTD RT                            PROCEDURE DATE:  05/27/2019          INTERPRETATION:  CLINICAL INFORMATION: Right leg pain and redness    COMPARISON: None available.    TECHNIQUE: Duplex sonography of the RIGHT LOWER extremity with color and   spectral Doppler, with and without compression.      FINDINGS:    There is normal compressibility of the right common femoral, femoral and   popliteal veins.     The contralateral common femoral vein is patent.    Doppler examination shows normal spontaneous and phasic flow.    No calf vein thrombosis is detected.    IMPRESSION:     No evidence of right lower extremity deep venous thrombosis.    < end of copied text >

## 2019-06-13 NOTE — PATIENT PROFILE ADULT - FUNCTIONAL SCREEN CURRENT LEVEL: BATHING, MLM
EXAM DESCRIPTION:  CT - Stone Protocol - 6/13/2019 6:25 pm

 

CLINICAL HISTORY:  Flank pain.

HEMATURIA

 

COMPARISON:  Head C Spine Cap Wo Con dated 9/30/2017

 

TECHNIQUE:  Axial images were obtained without oral or IV contrast. Lack of contrast limits solid org
an and vascular assessment. The field-of-view spans the entirety of the  system partially obscuring
 uppermost abdomen and lung bases. Coronal reformatted images were obtained and reviewed.

 

All CT scans are performed using dose optimization technique as appropriate and may include automated
 exposure control or mA/KV adjustment according to patient size.

 

FINDINGS:  Small right pleural effusion is noted.

 

The liver demonstrates no focal mass or biliary dilatation. Cholecystectomy clips are seen.The spleen
 is intact. The pancreas and adrenal glands are normal. No pathologic lymphadenopathy in the abdomen 
or pelvis.

 

Moderate right hydronephrosis and hydroureter seen to the level of the right UVJ. Obstructing calculu
s is not seen at the right UVJ. The bladder wall is quite thickened, however, and a small intralumina
l air bubble and surrounding inflammation is seen. Small calculi and debris is seen in the right alex
l collecting system. The left kidney is atrophic with mild left-sided hydronephrosis also seen.

 

No bowel obstruction, free air, free fluid or abscess. Postsurgical changes are seen in the lower ant
erior abdominal region.

 

No significant bony abnormality.

 

IMPRESSION:  Hydronephrosis and hydroureter is present bilaterally, greater on the right. Obstructing
 calculus is not seen however there is significant wall thickening which may be related to chronic cy
stitis. Consider cystoscopy assessment for further evaluation.

 

Nonobstructing calculi and debris are present in the dependent portions of the dilated right renal ca
lices.

 

Significant atrophy left kidney. 0 = independent

## 2019-06-18 PROBLEM — I25.10 ATHEROSCLEROTIC HEART DISEASE OF NATIVE CORONARY ARTERY WITHOUT ANGINA PECTORIS: Chronic | Status: ACTIVE | Noted: 2019-05-27

## 2019-06-18 PROBLEM — E21.3 HYPERPARATHYROIDISM, UNSPECIFIED: Chronic | Status: ACTIVE | Noted: 2019-05-27

## 2019-06-18 PROBLEM — E11.9 TYPE 2 DIABETES MELLITUS WITHOUT COMPLICATIONS: Chronic | Status: ACTIVE | Noted: 2019-05-27

## 2019-06-18 PROBLEM — I44.7 LEFT BUNDLE-BRANCH BLOCK, UNSPECIFIED: Chronic | Status: ACTIVE | Noted: 2019-05-27

## 2019-06-18 PROBLEM — N40.1 BENIGN PROSTATIC HYPERPLASIA WITH LOWER URINARY TRACT SYMPTOMS: Chronic | Status: ACTIVE | Noted: 2019-05-27

## 2019-06-18 PROBLEM — I73.9 PERIPHERAL VASCULAR DISEASE, UNSPECIFIED: Chronic | Status: ACTIVE | Noted: 2019-05-27

## 2019-06-27 ENCOUNTER — APPOINTMENT (OUTPATIENT)
Dept: VASCULAR SURGERY | Facility: CLINIC | Age: 74
End: 2019-06-27
Payer: MEDICARE

## 2019-06-27 PROCEDURE — 99213 OFFICE O/P EST LOW 20 MIN: CPT

## 2019-06-27 PROCEDURE — 93926 LOWER EXTREMITY STUDY: CPT

## 2019-06-27 NOTE — ASSESSMENT
[FreeTextEntry1] : 72 yo male with history of htn, pad sp right sfa stent presents for evaluation of right SFA stent\par No evidence of stenosis. At this time stents are patent. I do not believe ischemia is the cause of his cellulitis. Patient still with some erythema. I do not believe antibiotics and necessary any further. Should the cellulitis returned patient should be seen by infectious disease doctor.

## 2019-06-27 NOTE — CONSULT LETTER
[Dear  ___] : Dear  [unfilled], [Courtesy Letter:] : I had the pleasure of seeing your patient, [unfilled], in my office today. [Please see my note below.] : Please see my note below. [Consult Closing:] : Thank you very much for allowing me to participate in the care of this patient.  If you have any questions, please do not hesitate to contact me. [Sincerely,] : Sincerely, [FreeTextEntry3] : Jesús Mart M.D., FKVNG., R.P.V.I.\par \par  [facility]\par  of Vascular Surgery\par Vascular Surgery at [place]\par

## 2019-06-27 NOTE — HISTORY OF PRESENT ILLNESS
[FreeTextEntry1] : 72 yo male with history of htn, pad sp right sfa stent presents for evaluation\par Patient was recently hospitalized for right leg cellulitis. He has been on multiple antibiotics for the past several weeks. He notes that the erythema is still present

## 2019-06-27 NOTE — PHYSICAL EXAM
[JVD] : no jugular venous distention  [Normal Heart Sounds] : normal heart sounds [2+] : left 2+ [1+] : left 1+ [Varicose Veins Of Lower Extremities] : not present [Ankle Swelling (On Exam)] : not present [] : not present [Abdomen Tenderness] : ~T ~M No abdominal tenderness [No Rash or Lesion] : No rash or lesion [Calm] : calm [Alert] : alert [de-identified] : appears well [FreeTextEntry1] : Erythema of distal right calf and ankle

## 2019-10-07 NOTE — H&P ADULT - PROBLEM SELECTOR PLAN 7
right hand wound today, meds, xr
- controlled, not using inhalers or O2 at home  - continue to monitor

## 2020-01-01 ENCOUNTER — RESULT REVIEW (OUTPATIENT)
Age: 75
End: 2020-01-01

## 2020-01-01 ENCOUNTER — TRANSCRIPTION ENCOUNTER (OUTPATIENT)
Age: 75
End: 2020-01-01

## 2020-01-01 ENCOUNTER — APPOINTMENT (OUTPATIENT)
Dept: PULMONOLOGY | Facility: CLINIC | Age: 75
End: 2020-01-01
Payer: MEDICARE

## 2020-01-01 ENCOUNTER — APPOINTMENT (OUTPATIENT)
Dept: HEMATOLOGY ONCOLOGY | Facility: CLINIC | Age: 75
End: 2020-01-01
Payer: MEDICARE

## 2020-01-01 ENCOUNTER — APPOINTMENT (OUTPATIENT)
Dept: INFUSION THERAPY | Facility: HOSPITAL | Age: 75
End: 2020-01-01

## 2020-01-01 ENCOUNTER — APPOINTMENT (OUTPATIENT)
Dept: PULMONOLOGY | Facility: CLINIC | Age: 75
End: 2020-01-01

## 2020-01-01 ENCOUNTER — OUTPATIENT (OUTPATIENT)
Dept: OUTPATIENT SERVICES | Facility: HOSPITAL | Age: 75
LOS: 1 days | Discharge: HOME | End: 2020-01-01

## 2020-01-01 ENCOUNTER — APPOINTMENT (OUTPATIENT)
Dept: THORACIC SURGERY | Facility: HOSPITAL | Age: 75
End: 2020-01-01

## 2020-01-01 ENCOUNTER — LABORATORY RESULT (OUTPATIENT)
Age: 75
End: 2020-01-01

## 2020-01-01 ENCOUNTER — APPOINTMENT (OUTPATIENT)
Dept: HEMATOLOGY ONCOLOGY | Facility: CLINIC | Age: 75
End: 2020-01-01

## 2020-01-01 ENCOUNTER — INPATIENT (INPATIENT)
Facility: HOSPITAL | Age: 75
LOS: 0 days | DRG: 871 | End: 2020-10-09
Attending: INTERNAL MEDICINE | Admitting: INTERNAL MEDICINE
Payer: COMMERCIAL

## 2020-01-01 ENCOUNTER — APPOINTMENT (OUTPATIENT)
Dept: THORACIC SURGERY | Facility: CLINIC | Age: 75
End: 2020-01-01
Payer: MEDICARE

## 2020-01-01 ENCOUNTER — INPATIENT (INPATIENT)
Facility: HOSPITAL | Age: 75
LOS: 3 days | Discharge: HOME | End: 2020-05-24
Attending: THORACIC SURGERY (CARDIOTHORACIC VASCULAR SURGERY) | Admitting: THORACIC SURGERY (CARDIOTHORACIC VASCULAR SURGERY)
Payer: MEDICARE

## 2020-01-01 VITALS
HEART RATE: 70 BPM | RESPIRATION RATE: 14 BRPM | OXYGEN SATURATION: 96 % | WEIGHT: 230.38 LBS | BODY MASS INDEX: 31.27 KG/M2 | DIASTOLIC BLOOD PRESSURE: 69 MMHG | TEMPERATURE: 98.1 F | SYSTOLIC BLOOD PRESSURE: 190 MMHG

## 2020-01-01 VITALS
DIASTOLIC BLOOD PRESSURE: 80 MMHG | RESPIRATION RATE: 17 BRPM | OXYGEN SATURATION: 96 % | HEART RATE: 68 BPM | SYSTOLIC BLOOD PRESSURE: 137 MMHG | TEMPERATURE: 98.3 F

## 2020-01-01 VITALS
OXYGEN SATURATION: 97 % | BODY MASS INDEX: 30.88 KG/M2 | HEART RATE: 68 BPM | SYSTOLIC BLOOD PRESSURE: 120 MMHG | WEIGHT: 228 LBS | DIASTOLIC BLOOD PRESSURE: 85 MMHG | TEMPERATURE: 97.3 F | RESPIRATION RATE: 16 BRPM | HEIGHT: 72 IN

## 2020-01-01 VITALS
DIASTOLIC BLOOD PRESSURE: 60 MMHG | SYSTOLIC BLOOD PRESSURE: 100 MMHG | RESPIRATION RATE: 39 BRPM | HEIGHT: 72 IN | WEIGHT: 166.01 LBS | HEART RATE: 143 BPM | OXYGEN SATURATION: 72 %

## 2020-01-01 VITALS
RESPIRATION RATE: 16 BRPM | DIASTOLIC BLOOD PRESSURE: 83 MMHG | OXYGEN SATURATION: 96 % | SYSTOLIC BLOOD PRESSURE: 135 MMHG | WEIGHT: 227.05 LBS | BODY MASS INDEX: 30.82 KG/M2 | HEART RATE: 77 BPM | TEMPERATURE: 98.5 F

## 2020-01-01 VITALS
RESPIRATION RATE: 18 BRPM | TEMPERATURE: 98 F | HEART RATE: 88 BPM | SYSTOLIC BLOOD PRESSURE: 131 MMHG | DIASTOLIC BLOOD PRESSURE: 87 MMHG | WEIGHT: 239.42 LBS | OXYGEN SATURATION: 95 %

## 2020-01-01 VITALS
DIASTOLIC BLOOD PRESSURE: 60 MMHG | SYSTOLIC BLOOD PRESSURE: 130 MMHG | BODY MASS INDEX: 31.69 KG/M2 | OXYGEN SATURATION: 96 % | HEIGHT: 72 IN | RESPIRATION RATE: 16 BRPM | HEART RATE: 84 BPM | WEIGHT: 234 LBS

## 2020-01-01 VITALS
WEIGHT: 238 LBS | TEMPERATURE: 98.3 F | HEIGHT: 72 IN | RESPIRATION RATE: 17 BRPM | DIASTOLIC BLOOD PRESSURE: 89 MMHG | SYSTOLIC BLOOD PRESSURE: 191 MMHG | HEART RATE: 87 BPM | OXYGEN SATURATION: 98 % | BODY MASS INDEX: 32.23 KG/M2

## 2020-01-01 VITALS
WEIGHT: 238.1 LBS | TEMPERATURE: 98 F | HEART RATE: 68 BPM | SYSTOLIC BLOOD PRESSURE: 165 MMHG | DIASTOLIC BLOOD PRESSURE: 66 MMHG | HEIGHT: 72 IN | OXYGEN SATURATION: 96 % | RESPIRATION RATE: 18 BRPM

## 2020-01-01 VITALS
OXYGEN SATURATION: 97 % | RESPIRATION RATE: 14 BRPM | BODY MASS INDEX: 31.1 KG/M2 | SYSTOLIC BLOOD PRESSURE: 148 MMHG | TEMPERATURE: 98.2 F | HEIGHT: 71.65 IN | WEIGHT: 227.08 LBS | HEART RATE: 84 BPM | DIASTOLIC BLOOD PRESSURE: 83 MMHG

## 2020-01-01 VITALS
TEMPERATURE: 98.4 F | SYSTOLIC BLOOD PRESSURE: 140 MMHG | OXYGEN SATURATION: 98 % | BODY MASS INDEX: 32.23 KG/M2 | RESPIRATION RATE: 16 BRPM | DIASTOLIC BLOOD PRESSURE: 90 MMHG | HEART RATE: 64 BPM | WEIGHT: 238 LBS | HEIGHT: 72 IN

## 2020-01-01 VITALS
DIASTOLIC BLOOD PRESSURE: 71 MMHG | HEIGHT: 71.97 IN | TEMPERATURE: 98.3 F | RESPIRATION RATE: 17 BRPM | WEIGHT: 230.38 LBS | BODY MASS INDEX: 31.2 KG/M2 | HEART RATE: 70 BPM | SYSTOLIC BLOOD PRESSURE: 153 MMHG | OXYGEN SATURATION: 94 %

## 2020-01-01 DIAGNOSIS — R11.2 NAUSEA WITH VOMITING, UNSPECIFIED: ICD-10-CM

## 2020-01-01 DIAGNOSIS — C34.90 MALIGNANT NEOPLASM OF UNSPECIFIED PART OF UNSPECIFIED BRONCHUS OR LUNG: ICD-10-CM

## 2020-01-01 DIAGNOSIS — Z51.11 ENCOUNTER FOR ANTINEOPLASTIC CHEMOTHERAPY: ICD-10-CM

## 2020-01-01 DIAGNOSIS — Z86.79 PERSONAL HISTORY OF OTHER DISEASES OF THE CIRCULATORY SYSTEM: ICD-10-CM

## 2020-01-01 DIAGNOSIS — Z86.39 PERSONAL HISTORY OF OTHER ENDOCRINE, NUTRITIONAL AND METABOLIC DISEASE: ICD-10-CM

## 2020-01-01 DIAGNOSIS — Z98.890 OTHER SPECIFIED POSTPROCEDURAL STATES: Chronic | ICD-10-CM

## 2020-01-01 DIAGNOSIS — M19.90 UNSPECIFIED OSTEOARTHRITIS, UNSPECIFIED SITE: ICD-10-CM

## 2020-01-01 DIAGNOSIS — Z90.89 ACQUIRED ABSENCE OF OTHER ORGANS: Chronic | ICD-10-CM

## 2020-01-01 DIAGNOSIS — Z01.818 ENCOUNTER FOR OTHER PREPROCEDURAL EXAMINATION: ICD-10-CM

## 2020-01-01 DIAGNOSIS — Z87.448 PERSONAL HISTORY OF OTHER DISEASES OF URINARY SYSTEM: ICD-10-CM

## 2020-01-01 DIAGNOSIS — Z87.891 PERSONAL HISTORY OF NICOTINE DEPENDENCE: ICD-10-CM

## 2020-01-01 DIAGNOSIS — Z80.0 FAMILY HISTORY OF MALIGNANT NEOPLASM OF DIGESTIVE ORGANS: ICD-10-CM

## 2020-01-01 DIAGNOSIS — Z87.438 PERSONAL HISTORY OF OTHER DISEASES OF MALE GENITAL ORGANS: ICD-10-CM

## 2020-01-01 DIAGNOSIS — Z90.2 ACQUIRED ABSENCE OF LUNG [PART OF]: Chronic | ICD-10-CM

## 2020-01-01 DIAGNOSIS — R09.89 OTHER SPECIFIED SYMPTOMS AND SIGNS INVOLVING THE CIRCULATORY AND RESPIRATORY SYSTEMS: ICD-10-CM

## 2020-01-01 DIAGNOSIS — J44.9 CHRONIC OBSTRUCTIVE PULMONARY DISEASE, UNSPECIFIED: ICD-10-CM

## 2020-01-01 DIAGNOSIS — C34.31 MALIGNANT NEOPLASM OF LOWER LOBE, RIGHT BRONCHUS OR LUNG: ICD-10-CM

## 2020-01-01 DIAGNOSIS — R93.89 ABNORMAL FINDINGS ON DIAGNOSTIC IMAGING OF OTHER SPECIFIED BODY STRUCTURES: ICD-10-CM

## 2020-01-01 DIAGNOSIS — C80.1 MALIGNANT (PRIMARY) NEOPLASM, UNSPECIFIED: ICD-10-CM

## 2020-01-01 DIAGNOSIS — R94.2 ABNORMAL RESULTS OF PULMONARY FUNCTION STUDIES: ICD-10-CM

## 2020-01-01 DIAGNOSIS — J96.01 ACUTE RESPIRATORY FAILURE WITH HYPOXIA: ICD-10-CM

## 2020-01-01 DIAGNOSIS — R91.8 OTHER NONSPECIFIC ABNORMAL FINDING OF LUNG FIELD: ICD-10-CM

## 2020-01-01 DIAGNOSIS — R59.0 LOCALIZED ENLARGED LYMPH NODES: ICD-10-CM

## 2020-01-01 DIAGNOSIS — Z82.49 FAMILY HISTORY OF ISCHEMIC HEART DISEASE AND OTHER DISEASES OF THE CIRCULATORY SYSTEM: ICD-10-CM

## 2020-01-01 DIAGNOSIS — Z78.9 OTHER SPECIFIED HEALTH STATUS: ICD-10-CM

## 2020-01-01 DIAGNOSIS — J92.0 PLEURAL PLAQUE WITH PRESENCE OF ASBESTOS: ICD-10-CM

## 2020-01-01 DIAGNOSIS — Z87.09 PERSONAL HISTORY OF OTHER DISEASES OF THE RESPIRATORY SYSTEM: ICD-10-CM

## 2020-01-01 LAB
-  K. PNEUMONIAE GROUP: SIGNIFICANT CHANGE UP
ALBUMIN SERPL ELPH-MCNC: 2.7 G/DL — LOW (ref 3.3–5)
ALBUMIN SERPL ELPH-MCNC: 4.4 G/DL
ALBUMIN SERPL ELPH-MCNC: 4.4 G/DL
ALBUMIN SERPL ELPH-MCNC: 4.5 G/DL
ALBUMIN SERPL ELPH-MCNC: 4.7 G/DL
ALP BLD-CCNC: 101 U/L
ALP BLD-CCNC: 105 U/L
ALP BLD-CCNC: 95 U/L
ALP BLD-CCNC: 97 U/L
ALP SERPL-CCNC: 111 U/L — SIGNIFICANT CHANGE UP (ref 40–120)
ALT FLD-CCNC: 36 U/L — SIGNIFICANT CHANGE UP (ref 10–45)
ALT SERPL-CCNC: 21 U/L
ALT SERPL-CCNC: 24 U/L
ALT SERPL-CCNC: 32 U/L
ALT SERPL-CCNC: 42 U/L
ANION GAP SERPL CALC-SCNC: 12 MMO/L — SIGNIFICANT CHANGE UP (ref 7–14)
ANION GAP SERPL CALC-SCNC: 13 MMO/L — SIGNIFICANT CHANGE UP (ref 7–14)
ANION GAP SERPL CALC-SCNC: 13 MMOL/L
ANION GAP SERPL CALC-SCNC: 14 MMO/L — SIGNIFICANT CHANGE UP (ref 7–14)
ANION GAP SERPL CALC-SCNC: 15 MMOL/L
ANION GAP SERPL CALC-SCNC: 15 MMOL/L
ANION GAP SERPL CALC-SCNC: 16 MMOL/L
ANION GAP SERPL CALC-SCNC: 18 MMOL/L — HIGH (ref 5–17)
APTT BLD: 29.9 SEC — SIGNIFICANT CHANGE UP (ref 27.5–35.5)
APTT BLD: 31.2 SEC — SIGNIFICANT CHANGE UP (ref 27.5–36.3)
AST SERPL-CCNC: 17 U/L
AST SERPL-CCNC: 20 U/L
AST SERPL-CCNC: 28 U/L
AST SERPL-CCNC: 29 U/L
AST SERPL-CCNC: 39 U/L — SIGNIFICANT CHANGE UP (ref 10–40)
BASOPHILS # BLD AUTO: 0 K/UL — SIGNIFICANT CHANGE UP (ref 0–0.2)
BASOPHILS # BLD AUTO: 0 K/UL — SIGNIFICANT CHANGE UP (ref 0–0.2)
BASOPHILS # BLD AUTO: 0.01 K/UL — SIGNIFICANT CHANGE UP (ref 0–0.2)
BASOPHILS # BLD AUTO: 0.02 K/UL — SIGNIFICANT CHANGE UP (ref 0–0.2)
BASOPHILS # BLD AUTO: 0.03 K/UL — SIGNIFICANT CHANGE UP (ref 0–0.2)
BASOPHILS # BLD AUTO: 0.03 K/UL — SIGNIFICANT CHANGE UP (ref 0–0.2)
BASOPHILS # BLD AUTO: 0.04 K/UL — SIGNIFICANT CHANGE UP (ref 0–0.2)
BASOPHILS # BLD AUTO: 0.05 K/UL — SIGNIFICANT CHANGE UP (ref 0–0.2)
BASOPHILS # BLD AUTO: 0.05 K/UL — SIGNIFICANT CHANGE UP (ref 0–0.2)
BASOPHILS # BLD AUTO: 0.09 K/UL — SIGNIFICANT CHANGE UP (ref 0–0.2)
BASOPHILS NFR BLD AUTO: 0 % — SIGNIFICANT CHANGE UP (ref 0–2)
BASOPHILS NFR BLD AUTO: 0 % — SIGNIFICANT CHANGE UP (ref 0–2)
BASOPHILS NFR BLD AUTO: 0.2 % — SIGNIFICANT CHANGE UP (ref 0–2)
BASOPHILS NFR BLD AUTO: 0.3 % — SIGNIFICANT CHANGE UP (ref 0–2)
BASOPHILS NFR BLD AUTO: 0.9 % — SIGNIFICANT CHANGE UP (ref 0–2)
BASOPHILS NFR BLD AUTO: 1 % — SIGNIFICANT CHANGE UP (ref 0–2)
BILIRUB SERPL-MCNC: 0.5 MG/DL
BILIRUB SERPL-MCNC: 0.6 MG/DL
BILIRUB SERPL-MCNC: 0.8 MG/DL
BILIRUB SERPL-MCNC: 1 MG/DL
BILIRUB SERPL-MCNC: 1.3 MG/DL — HIGH (ref 0.2–1.2)
BLD GP AB SCN SERPL QL: NEGATIVE — SIGNIFICANT CHANGE UP
BLOOD GAS COMMENTS ARTERIAL: SIGNIFICANT CHANGE UP
BUN SERPL-MCNC: 14 MG/DL
BUN SERPL-MCNC: 15 MG/DL — SIGNIFICANT CHANGE UP (ref 7–23)
BUN SERPL-MCNC: 16 MG/DL
BUN SERPL-MCNC: 17 MG/DL — SIGNIFICANT CHANGE UP (ref 7–23)
BUN SERPL-MCNC: 18 MG/DL — SIGNIFICANT CHANGE UP (ref 7–23)
BUN SERPL-MCNC: 18 MG/DL — SIGNIFICANT CHANGE UP (ref 7–23)
BUN SERPL-MCNC: 19 MG/DL — SIGNIFICANT CHANGE UP (ref 7–23)
BUN SERPL-MCNC: 19 MG/DL — SIGNIFICANT CHANGE UP (ref 7–23)
CALCIUM SERPL-MCNC: 8.4 MG/DL — SIGNIFICANT CHANGE UP (ref 8.4–10.5)
CALCIUM SERPL-MCNC: 8.8 MG/DL — SIGNIFICANT CHANGE UP (ref 8.4–10.5)
CALCIUM SERPL-MCNC: 8.8 MG/DL — SIGNIFICANT CHANGE UP (ref 8.4–10.5)
CALCIUM SERPL-MCNC: 9 MG/DL — SIGNIFICANT CHANGE UP (ref 8.4–10.5)
CALCIUM SERPL-MCNC: 9.1 MG/DL
CALCIUM SERPL-MCNC: 9.3 MG/DL — SIGNIFICANT CHANGE UP (ref 8.4–10.5)
CALCIUM SERPL-MCNC: 9.3 MG/DL — SIGNIFICANT CHANGE UP (ref 8.4–10.5)
CALCIUM SERPL-MCNC: 9.4 MG/DL
CALCIUM SERPL-MCNC: 9.5 MG/DL
CALCIUM SERPL-MCNC: 9.7 MG/DL
CHLORIDE SERPL-SCNC: 101 MMOL/L — SIGNIFICANT CHANGE UP (ref 98–107)
CHLORIDE SERPL-SCNC: 101 MMOL/L — SIGNIFICANT CHANGE UP (ref 98–107)
CHLORIDE SERPL-SCNC: 102 MMOL/L
CHLORIDE SERPL-SCNC: 102 MMOL/L — SIGNIFICANT CHANGE UP (ref 98–107)
CHLORIDE SERPL-SCNC: 103 MMOL/L
CHLORIDE SERPL-SCNC: 104 MMOL/L — SIGNIFICANT CHANGE UP (ref 98–107)
CHLORIDE SERPL-SCNC: 107 MMOL/L — SIGNIFICANT CHANGE UP (ref 98–107)
CHLORIDE SERPL-SCNC: 96 MMOL/L
CHLORIDE SERPL-SCNC: 97 MMOL/L
CHLORIDE SERPL-SCNC: 99 MMOL/L — SIGNIFICANT CHANGE UP (ref 96–108)
CO2 BLDA-SCNC: 12 MMOL/L — LOW (ref 22–30)
CO2 SERPL-SCNC: 20 MMOL/L — LOW (ref 22–31)
CO2 SERPL-SCNC: 21 MMOL/L — LOW (ref 22–31)
CO2 SERPL-SCNC: 22 MMOL/L — SIGNIFICANT CHANGE UP (ref 22–31)
CO2 SERPL-SCNC: 24 MMOL/L — SIGNIFICANT CHANGE UP (ref 22–31)
CO2 SERPL-SCNC: 25 MMOL/L
CO2 SERPL-SCNC: 27 MMOL/L
CO2 SERPL-SCNC: 28 MMOL/L
CO2 SERPL-SCNC: 29 MMOL/L
CREAT SERPL-MCNC: 0.94 MG/DL — SIGNIFICANT CHANGE UP (ref 0.5–1.3)
CREAT SERPL-MCNC: 1.1 MG/DL
CREAT SERPL-MCNC: 1.12 MG/DL — SIGNIFICANT CHANGE UP (ref 0.5–1.3)
CREAT SERPL-MCNC: 1.13 MG/DL
CREAT SERPL-MCNC: 1.15 MG/DL — SIGNIFICANT CHANGE UP (ref 0.5–1.3)
CREAT SERPL-MCNC: 1.18 MG/DL
CREAT SERPL-MCNC: 1.19 MG/DL
CREAT SERPL-MCNC: 1.26 MG/DL — SIGNIFICANT CHANGE UP (ref 0.5–1.3)
CREAT SERPL-MCNC: 1.33 MG/DL — HIGH (ref 0.5–1.3)
CREAT SERPL-MCNC: 1.81 MG/DL — HIGH (ref 0.5–1.3)
D DIMER BLD IA.RAPID-MCNC: 4325 NG/ML DDU — HIGH
EOSINOPHIL # BLD AUTO: 0 K/UL — SIGNIFICANT CHANGE UP (ref 0–0.5)
EOSINOPHIL # BLD AUTO: 0.01 K/UL — SIGNIFICANT CHANGE UP (ref 0–0.5)
EOSINOPHIL # BLD AUTO: 0.11 K/UL — SIGNIFICANT CHANGE UP (ref 0–0.5)
EOSINOPHIL # BLD AUTO: 0.12 K/UL — SIGNIFICANT CHANGE UP (ref 0–0.5)
EOSINOPHIL # BLD AUTO: 0.12 K/UL — SIGNIFICANT CHANGE UP (ref 0–0.5)
EOSINOPHIL # BLD AUTO: 0.14 K/UL — SIGNIFICANT CHANGE UP (ref 0–0.5)
EOSINOPHIL # BLD AUTO: 0.16 K/UL — SIGNIFICANT CHANGE UP (ref 0–0.5)
EOSINOPHIL # BLD AUTO: 0.2 K/UL — SIGNIFICANT CHANGE UP (ref 0–0.5)
EOSINOPHIL # BLD AUTO: 0.21 K/UL — SIGNIFICANT CHANGE UP (ref 0–0.5)
EOSINOPHIL # BLD AUTO: 0.69 K/UL — HIGH (ref 0–0.5)
EOSINOPHIL NFR BLD AUTO: 0 % — SIGNIFICANT CHANGE UP (ref 0–6)
EOSINOPHIL NFR BLD AUTO: 0.1 % — SIGNIFICANT CHANGE UP (ref 0–6)
EOSINOPHIL NFR BLD AUTO: 0.9 % — SIGNIFICANT CHANGE UP (ref 0–6)
EOSINOPHIL NFR BLD AUTO: 1 % — SIGNIFICANT CHANGE UP (ref 0–6)
EOSINOPHIL NFR BLD AUTO: 1.2 % — SIGNIFICANT CHANGE UP (ref 0–6)
EOSINOPHIL NFR BLD AUTO: 1.8 % — SIGNIFICANT CHANGE UP (ref 0–6)
EOSINOPHIL NFR BLD AUTO: 2 % — SIGNIFICANT CHANGE UP (ref 0–6)
EOSINOPHIL NFR BLD AUTO: 3.6 % — SIGNIFICANT CHANGE UP (ref 0–6)
EOSINOPHIL NFR BLD AUTO: 4 % — SIGNIFICANT CHANGE UP (ref 0–6)
EOSINOPHIL NFR BLD AUTO: 7 % — HIGH (ref 0–6)
GAS PNL BLDA: SIGNIFICANT CHANGE UP
GAS PNL BLDV: 141 MMOL/L — SIGNIFICANT CHANGE UP (ref 136–146)
GLUCOSE BLDC GLUCOMTR-MCNC: 108 MG/DL — HIGH (ref 70–99)
GLUCOSE BLDC GLUCOMTR-MCNC: 109 MG/DL — HIGH (ref 70–99)
GLUCOSE BLDC GLUCOMTR-MCNC: 112 MG/DL — HIGH (ref 70–99)
GLUCOSE BLDC GLUCOMTR-MCNC: 118 MG/DL — HIGH (ref 70–99)
GLUCOSE BLDC GLUCOMTR-MCNC: 119 MG/DL — HIGH (ref 70–99)
GLUCOSE BLDC GLUCOMTR-MCNC: 122 MG/DL — HIGH (ref 70–99)
GLUCOSE BLDC GLUCOMTR-MCNC: 125 MG/DL — HIGH (ref 70–99)
GLUCOSE BLDC GLUCOMTR-MCNC: 128 MG/DL — HIGH (ref 70–99)
GLUCOSE BLDC GLUCOMTR-MCNC: 128 MG/DL — HIGH (ref 70–99)
GLUCOSE BLDC GLUCOMTR-MCNC: 130 MG/DL — HIGH (ref 70–99)
GLUCOSE BLDC GLUCOMTR-MCNC: 131 MG/DL — HIGH (ref 70–99)
GLUCOSE BLDC GLUCOMTR-MCNC: 132 MG/DL — HIGH (ref 70–99)
GLUCOSE BLDC GLUCOMTR-MCNC: 134 MG/DL — HIGH (ref 70–99)
GLUCOSE BLDC GLUCOMTR-MCNC: 174 MG/DL — HIGH (ref 70–99)
GLUCOSE BLDC GLUCOMTR-MCNC: 96 MG/DL — SIGNIFICANT CHANGE UP (ref 70–99)
GLUCOSE BLDV-MCNC: 134 MG/DL — HIGH (ref 70–99)
GLUCOSE SERPL-MCNC: 117 MG/DL — HIGH (ref 70–99)
GLUCOSE SERPL-MCNC: 128 MG/DL — HIGH (ref 70–99)
GLUCOSE SERPL-MCNC: 137 MG/DL — HIGH (ref 70–99)
GLUCOSE SERPL-MCNC: 138 MG/DL — HIGH (ref 70–99)
GLUCOSE SERPL-MCNC: 143 MG/DL — HIGH (ref 70–99)
GLUCOSE SERPL-MCNC: 180 MG/DL
GLUCOSE SERPL-MCNC: 227 MG/DL — HIGH (ref 70–99)
GLUCOSE SERPL-MCNC: 231 MG/DL
GLUCOSE SERPL-MCNC: 269 MG/DL
GLUCOSE SERPL-MCNC: 96 MG/DL
GRAM STN FLD: SIGNIFICANT CHANGE UP
HBA1C BLD-MCNC: 6.3 % — HIGH (ref 4–5.6)
HBV CORE IGG+IGM SER QL: REACTIVE
HBV SURFACE AB SER QL: REACTIVE
HBV SURFACE AG SER QL: NORMAL
HCT VFR BLD CALC: 36.6 % — LOW (ref 39–50)
HCT VFR BLD CALC: 37.2 % — LOW (ref 39–50)
HCT VFR BLD CALC: 39.9 % — SIGNIFICANT CHANGE UP (ref 39–50)
HCT VFR BLD CALC: 40 % — SIGNIFICANT CHANGE UP (ref 39–50)
HCT VFR BLD CALC: 40.6 % — SIGNIFICANT CHANGE UP (ref 39–50)
HCT VFR BLD CALC: 40.8 % — SIGNIFICANT CHANGE UP (ref 39–50)
HCT VFR BLD CALC: 41.3 % — SIGNIFICANT CHANGE UP (ref 39–50)
HCT VFR BLD CALC: 41.7 % — SIGNIFICANT CHANGE UP (ref 39–50)
HCT VFR BLD CALC: 42.7 % — SIGNIFICANT CHANGE UP (ref 39–50)
HCT VFR BLD CALC: 43 % — SIGNIFICANT CHANGE UP (ref 39–50)
HCT VFR BLD CALC: 43.5 % — SIGNIFICANT CHANGE UP (ref 39–50)
HCT VFR BLD CALC: 44.9 % — SIGNIFICANT CHANGE UP (ref 39–50)
HCT VFR BLD CALC: 45.9 % — SIGNIFICANT CHANGE UP (ref 39–50)
HCT VFR BLD CALC: 46.6 % — SIGNIFICANT CHANGE UP (ref 39–50)
HCT VFR BLDV CALC: 49.3 % — SIGNIFICANT CHANGE UP (ref 39–51)
HCV AB SER QL: NONREACTIVE
HCV S/CO RATIO: 0.09 S/CO
HGB BLD-MCNC: 11.9 G/DL — LOW (ref 13–17)
HGB BLD-MCNC: 12.3 G/DL — LOW (ref 13–17)
HGB BLD-MCNC: 12.5 G/DL — LOW (ref 13–17)
HGB BLD-MCNC: 13.2 G/DL — SIGNIFICANT CHANGE UP (ref 13–17)
HGB BLD-MCNC: 13.5 G/DL — SIGNIFICANT CHANGE UP (ref 13–17)
HGB BLD-MCNC: 13.8 G/DL — SIGNIFICANT CHANGE UP (ref 13–17)
HGB BLD-MCNC: 14 G/DL — SIGNIFICANT CHANGE UP (ref 13–17)
HGB BLD-MCNC: 14 G/DL — SIGNIFICANT CHANGE UP (ref 13–17)
HGB BLD-MCNC: 14.1 G/DL — SIGNIFICANT CHANGE UP (ref 13–17)
HGB BLD-MCNC: 14.1 G/DL — SIGNIFICANT CHANGE UP (ref 13–17)
HGB BLD-MCNC: 14.4 G/DL — SIGNIFICANT CHANGE UP (ref 13–17)
HGB BLD-MCNC: 14.4 G/DL — SIGNIFICANT CHANGE UP (ref 13–17)
HGB BLD-MCNC: 14.5 G/DL — SIGNIFICANT CHANGE UP (ref 13–17)
HGB BLD-MCNC: 15.2 G/DL — SIGNIFICANT CHANGE UP (ref 13–17)
HOROWITZ INDEX BLDA+IHG-RTO: SIGNIFICANT CHANGE UP
IMM GRANULOCYTES NFR BLD AUTO: 0.3 % — SIGNIFICANT CHANGE UP (ref 0–1.5)
IMM GRANULOCYTES NFR BLD AUTO: 0.6 % — SIGNIFICANT CHANGE UP (ref 0–1.5)
IMM GRANULOCYTES NFR BLD AUTO: 0.6 % — SIGNIFICANT CHANGE UP (ref 0–1.5)
IMM GRANULOCYTES NFR BLD AUTO: 0.7 % — SIGNIFICANT CHANGE UP (ref 0–1.5)
IMM GRANULOCYTES NFR BLD AUTO: 1.2 % — SIGNIFICANT CHANGE UP (ref 0–1.5)
IMM GRANULOCYTES NFR BLD AUTO: 2.6 % — HIGH (ref 0–1.5)
IMM GRANULOCYTES NFR BLD AUTO: 3.4 % — HIGH (ref 0–1.5)
INR BLD: 0.98 — SIGNIFICANT CHANGE UP (ref 0.88–1.17)
INR BLD: 1.19 RATIO — HIGH (ref 0.88–1.16)
LACTATE SERPL-SCNC: 12.7 MMOL/L — CRITICAL HIGH (ref 0.7–2)
LIDOCAIN IGE QN: 213 U/L — SIGNIFICANT CHANGE UP (ref 73–393)
LYMPHOCYTES # BLD AUTO: 0.89 K/UL — LOW (ref 1–3.3)
LYMPHOCYTES # BLD AUTO: 1.06 K/UL — SIGNIFICANT CHANGE UP (ref 1–3.3)
LYMPHOCYTES # BLD AUTO: 1.16 K/UL — SIGNIFICANT CHANGE UP (ref 1–3.3)
LYMPHOCYTES # BLD AUTO: 1.16 K/UL — SIGNIFICANT CHANGE UP (ref 1–3.3)
LYMPHOCYTES # BLD AUTO: 1.21 K/UL — SIGNIFICANT CHANGE UP (ref 1–3.3)
LYMPHOCYTES # BLD AUTO: 1.29 K/UL — SIGNIFICANT CHANGE UP (ref 1–3.3)
LYMPHOCYTES # BLD AUTO: 1.36 K/UL — SIGNIFICANT CHANGE UP (ref 1–3.3)
LYMPHOCYTES # BLD AUTO: 1.37 K/UL — SIGNIFICANT CHANGE UP (ref 1–3.3)
LYMPHOCYTES # BLD AUTO: 1.39 K/UL — SIGNIFICANT CHANGE UP (ref 1–3.3)
LYMPHOCYTES # BLD AUTO: 1.93 K/UL — SIGNIFICANT CHANGE UP (ref 1–3.3)
LYMPHOCYTES # BLD AUTO: 11.5 % — LOW (ref 13–44)
LYMPHOCYTES # BLD AUTO: 18.9 % — SIGNIFICANT CHANGE UP (ref 13–44)
LYMPHOCYTES # BLD AUTO: 19.5 % — SIGNIFICANT CHANGE UP (ref 13–44)
LYMPHOCYTES # BLD AUTO: 20 % — SIGNIFICANT CHANGE UP (ref 13–44)
LYMPHOCYTES # BLD AUTO: 23.5 % — SIGNIFICANT CHANGE UP (ref 13–44)
LYMPHOCYTES # BLD AUTO: 27 % — SIGNIFICANT CHANGE UP (ref 13–44)
LYMPHOCYTES # BLD AUTO: 7.6 % — LOW (ref 13–44)
LYMPHOCYTES # BLD AUTO: 8 % — LOW (ref 13–44)
LYMPHOCYTES # BLD AUTO: 9 % — LOW (ref 13–44)
LYMPHOCYTES # BLD AUTO: 9.7 % — LOW (ref 13–44)
MAGNESIUM SERPL-MCNC: 2.1 MG/DL — SIGNIFICANT CHANGE UP (ref 1.6–2.6)
MAGNESIUM SERPL-MCNC: 2.2 MG/DL — SIGNIFICANT CHANGE UP (ref 1.6–2.6)
MAGNESIUM SERPL-MCNC: 2.2 MG/DL — SIGNIFICANT CHANGE UP (ref 1.6–2.6)
MAGNESIUM SERPL-MCNC: 2.3 MG/DL — SIGNIFICANT CHANGE UP (ref 1.6–2.6)
MCHC RBC-ENTMCNC: 28.5 PG — SIGNIFICANT CHANGE UP (ref 27–34)
MCHC RBC-ENTMCNC: 28.5 PG — SIGNIFICANT CHANGE UP (ref 27–34)
MCHC RBC-ENTMCNC: 28.6 PG — SIGNIFICANT CHANGE UP (ref 27–34)
MCHC RBC-ENTMCNC: 28.8 PG — SIGNIFICANT CHANGE UP (ref 27–34)
MCHC RBC-ENTMCNC: 28.9 PG — SIGNIFICANT CHANGE UP (ref 27–34)
MCHC RBC-ENTMCNC: 28.9 PG — SIGNIFICANT CHANGE UP (ref 27–34)
MCHC RBC-ENTMCNC: 29.1 PG — SIGNIFICANT CHANGE UP (ref 27–34)
MCHC RBC-ENTMCNC: 29.1 PG — SIGNIFICANT CHANGE UP (ref 27–34)
MCHC RBC-ENTMCNC: 29.2 PG — SIGNIFICANT CHANGE UP (ref 27–34)
MCHC RBC-ENTMCNC: 29.3 GM/DL — LOW (ref 32–36)
MCHC RBC-ENTMCNC: 29.3 PG — SIGNIFICANT CHANGE UP (ref 27–34)
MCHC RBC-ENTMCNC: 29.4 PG — SIGNIFICANT CHANGE UP (ref 27–34)
MCHC RBC-ENTMCNC: 29.6 PG — SIGNIFICANT CHANGE UP (ref 27–34)
MCHC RBC-ENTMCNC: 29.7 PG — SIGNIFICANT CHANGE UP (ref 27–34)
MCHC RBC-ENTMCNC: 30.7 PG — SIGNIFICANT CHANGE UP (ref 27–34)
MCHC RBC-ENTMCNC: 31.6 % — LOW (ref 32–36)
MCHC RBC-ENTMCNC: 32.1 GM/DL — SIGNIFICANT CHANGE UP (ref 32–36)
MCHC RBC-ENTMCNC: 32.3 % — SIGNIFICANT CHANGE UP (ref 32–36)
MCHC RBC-ENTMCNC: 32.4 % — SIGNIFICANT CHANGE UP (ref 32–36)
MCHC RBC-ENTMCNC: 32.4 GM/DL — SIGNIFICANT CHANGE UP (ref 32–36)
MCHC RBC-ENTMCNC: 32.6 % — SIGNIFICANT CHANGE UP (ref 32–36)
MCHC RBC-ENTMCNC: 33.5 GM/DL — SIGNIFICANT CHANGE UP (ref 32–36)
MCHC RBC-ENTMCNC: 33.6 GM/DL — SIGNIFICANT CHANGE UP (ref 32–36)
MCHC RBC-ENTMCNC: 33.8 GM/DL — SIGNIFICANT CHANGE UP (ref 32–36)
MCHC RBC-ENTMCNC: 34.1 % — SIGNIFICANT CHANGE UP (ref 32–36)
MCHC RBC-ENTMCNC: 35 GM/DL — SIGNIFICANT CHANGE UP (ref 32–36)
MCV RBC AUTO: 82.3 FL — SIGNIFICANT CHANGE UP (ref 80–100)
MCV RBC AUTO: 84.4 FL — SIGNIFICANT CHANGE UP (ref 80–100)
MCV RBC AUTO: 85.1 FL — SIGNIFICANT CHANGE UP (ref 80–100)
MCV RBC AUTO: 86.9 FL — SIGNIFICANT CHANGE UP (ref 80–100)
MCV RBC AUTO: 87 FL — SIGNIFICANT CHANGE UP (ref 80–100)
MCV RBC AUTO: 87.1 FL — SIGNIFICANT CHANGE UP (ref 80–100)
MCV RBC AUTO: 89.3 FL — SIGNIFICANT CHANGE UP (ref 80–100)
MCV RBC AUTO: 89.5 FL — SIGNIFICANT CHANGE UP (ref 80–100)
MCV RBC AUTO: 90 FL — SIGNIFICANT CHANGE UP (ref 80–100)
MCV RBC AUTO: 90 FL — SIGNIFICANT CHANGE UP (ref 80–100)
MCV RBC AUTO: 90.9 FL — SIGNIFICANT CHANGE UP (ref 80–100)
MCV RBC AUTO: 91.4 FL — SIGNIFICANT CHANGE UP (ref 80–100)
MCV RBC AUTO: 94.1 FL — SIGNIFICANT CHANGE UP (ref 80–100)
MCV RBC AUTO: 97.4 FL — SIGNIFICANT CHANGE UP (ref 80–100)
METHOD TYPE: SIGNIFICANT CHANGE UP
MONOCYTES # BLD AUTO: 0.72 K/UL — SIGNIFICANT CHANGE UP (ref 0–0.9)
MONOCYTES # BLD AUTO: 0.84 K/UL — SIGNIFICANT CHANGE UP (ref 0–0.9)
MONOCYTES # BLD AUTO: 0.92 K/UL — HIGH (ref 0–0.9)
MONOCYTES # BLD AUTO: 0.96 K/UL — HIGH (ref 0–0.9)
MONOCYTES # BLD AUTO: 1 K/UL — HIGH (ref 0–0.9)
MONOCYTES # BLD AUTO: 1.01 K/UL — HIGH (ref 0–0.9)
MONOCYTES # BLD AUTO: 1.11 K/UL — HIGH (ref 0–0.9)
MONOCYTES # BLD AUTO: 1.23 K/UL — HIGH (ref 0–0.9)
MONOCYTES # BLD AUTO: 1.24 K/UL — HIGH (ref 0–0.9)
MONOCYTES # BLD AUTO: 1.41 K/UL — HIGH (ref 0–0.9)
MONOCYTES NFR BLD AUTO: 10 % — SIGNIFICANT CHANGE UP (ref 2–14)
MONOCYTES NFR BLD AUTO: 10.1 % — SIGNIFICANT CHANGE UP (ref 2–14)
MONOCYTES NFR BLD AUTO: 10.5 % — SIGNIFICANT CHANGE UP (ref 2–14)
MONOCYTES NFR BLD AUTO: 11.7 % — SIGNIFICANT CHANGE UP (ref 2–14)
MONOCYTES NFR BLD AUTO: 12.2 % — SIGNIFICANT CHANGE UP (ref 2–14)
MONOCYTES NFR BLD AUTO: 12.2 % — SIGNIFICANT CHANGE UP (ref 2–14)
MONOCYTES NFR BLD AUTO: 12.3 % — SIGNIFICANT CHANGE UP (ref 2–14)
MONOCYTES NFR BLD AUTO: 14 % — SIGNIFICANT CHANGE UP (ref 2–14)
MONOCYTES NFR BLD AUTO: 20 % — HIGH (ref 2–14)
MONOCYTES NFR BLD AUTO: 5.8 % — SIGNIFICANT CHANGE UP (ref 2–14)
NEUTROPHILS # BLD AUTO: 13.31 K/UL — HIGH (ref 1.8–7.4)
NEUTROPHILS # BLD AUTO: 2.42 K/UL — SIGNIFICANT CHANGE UP (ref 1.8–7.4)
NEUTROPHILS # BLD AUTO: 3.54 K/UL — SIGNIFICANT CHANGE UP (ref 1.8–7.4)
NEUTROPHILS # BLD AUTO: 4.37 K/UL — SIGNIFICANT CHANGE UP (ref 1.8–7.4)
NEUTROPHILS # BLD AUTO: 4.5 K/UL — SIGNIFICANT CHANGE UP (ref 1.8–7.4)
NEUTROPHILS # BLD AUTO: 6.11 K/UL — SIGNIFICANT CHANGE UP (ref 1.8–7.4)
NEUTROPHILS # BLD AUTO: 7.27 K/UL — SIGNIFICANT CHANGE UP (ref 1.8–7.4)
NEUTROPHILS # BLD AUTO: 8.89 K/UL — HIGH (ref 1.8–7.4)
NEUTROPHILS # BLD AUTO: 9.09 K/UL — HIGH (ref 1.8–7.4)
NEUTROPHILS # BLD AUTO: 9.45 K/UL — HIGH (ref 1.8–7.4)
NEUTROPHILS NFR BLD AUTO: 48 % — SIGNIFICANT CHANGE UP (ref 43–77)
NEUTROPHILS NFR BLD AUTO: 59.8 % — SIGNIFICANT CHANGE UP (ref 43–77)
NEUTROPHILS NFR BLD AUTO: 61.9 % — SIGNIFICANT CHANGE UP (ref 43–77)
NEUTROPHILS NFR BLD AUTO: 64 % — SIGNIFICANT CHANGE UP (ref 43–77)
NEUTROPHILS NFR BLD AUTO: 66.1 % — SIGNIFICANT CHANGE UP (ref 43–77)
NEUTROPHILS NFR BLD AUTO: 72.2 % — SIGNIFICANT CHANGE UP (ref 43–77)
NEUTROPHILS NFR BLD AUTO: 74 % — SIGNIFICANT CHANGE UP (ref 43–77)
NEUTROPHILS NFR BLD AUTO: 79.8 % — HIGH (ref 43–77)
NEUTROPHILS NFR BLD AUTO: 80 % — HIGH (ref 43–77)
NEUTROPHILS NFR BLD AUTO: 84.1 % — HIGH (ref 43–77)
NRBC # BLD: 0 /100 WBCS — SIGNIFICANT CHANGE UP (ref 0–0)
NRBC # BLD: 0 /100 — SIGNIFICANT CHANGE UP (ref 0–0)
NRBC # BLD: 0 /100 — SIGNIFICANT CHANGE UP (ref 0–0)
NRBC # BLD: SIGNIFICANT CHANGE UP /100 WBCS (ref 0–0)
NRBC # BLD: SIGNIFICANT CHANGE UP /100 WBCS (ref 0–0)
NRBC # FLD: 0 K/UL — SIGNIFICANT CHANGE UP (ref 0–0)
NT-PROBNP SERPL-SCNC: 1679 PG/ML — HIGH (ref 0–300)
PCO2 BLDA: 19 MMHG — LOW (ref 32–46)
PH BLDA: 7.4 — SIGNIFICANT CHANGE UP (ref 7.35–7.45)
PHOSPHATE SERPL-MCNC: 2.4 MG/DL — LOW (ref 2.5–4.5)
PHOSPHATE SERPL-MCNC: 2.7 MG/DL — SIGNIFICANT CHANGE UP (ref 2.5–4.5)
PLAT MORPH BLD: NORMAL — SIGNIFICANT CHANGE UP
PLAT MORPH BLD: NORMAL — SIGNIFICANT CHANGE UP
PLATELET # BLD AUTO: 120 K/UL — LOW (ref 150–400)
PLATELET # BLD AUTO: 146 K/UL — LOW (ref 150–400)
PLATELET # BLD AUTO: 207 K/UL — SIGNIFICANT CHANGE UP (ref 150–400)
PLATELET # BLD AUTO: 216 K/UL — SIGNIFICANT CHANGE UP (ref 150–400)
PLATELET # BLD AUTO: 224 K/UL — SIGNIFICANT CHANGE UP (ref 150–400)
PLATELET # BLD AUTO: 228 K/UL — SIGNIFICANT CHANGE UP (ref 150–400)
PLATELET # BLD AUTO: 244 K/UL — SIGNIFICANT CHANGE UP (ref 150–400)
PLATELET # BLD AUTO: 246 K/UL — SIGNIFICANT CHANGE UP (ref 150–400)
PLATELET # BLD AUTO: 249 K/UL — SIGNIFICANT CHANGE UP (ref 150–400)
PLATELET # BLD AUTO: 290 K/UL — SIGNIFICANT CHANGE UP (ref 150–400)
PLATELET # BLD AUTO: 327 K/UL — SIGNIFICANT CHANGE UP (ref 150–400)
PLATELET # BLD AUTO: 420 K/UL — HIGH (ref 150–400)
PLATELET # BLD AUTO: 516 K/UL — HIGH (ref 150–400)
PLATELET # BLD AUTO: 75 K/UL — LOW (ref 150–400)
PMV BLD: 10.2 FL — SIGNIFICANT CHANGE UP (ref 7–13)
PMV BLD: 10.4 FL — SIGNIFICANT CHANGE UP (ref 7–13)
PMV BLD: 9.6 FL — SIGNIFICANT CHANGE UP (ref 7–13)
PO2 BLDA: 415 MMHG — HIGH (ref 74–108)
POTASSIUM BLDV-SCNC: 3.9 MMOL/L — SIGNIFICANT CHANGE UP (ref 3.4–4.5)
POTASSIUM SERPL-MCNC: 3.7 MMOL/L — SIGNIFICANT CHANGE UP (ref 3.5–5.3)
POTASSIUM SERPL-MCNC: 3.8 MMOL/L — SIGNIFICANT CHANGE UP (ref 3.5–5.3)
POTASSIUM SERPL-MCNC: 3.9 MMOL/L — SIGNIFICANT CHANGE UP (ref 3.5–5.3)
POTASSIUM SERPL-MCNC: 4 MMOL/L — SIGNIFICANT CHANGE UP (ref 3.5–5.3)
POTASSIUM SERPL-MCNC: 4.1 MMOL/L — SIGNIFICANT CHANGE UP (ref 3.5–5.3)
POTASSIUM SERPL-MCNC: 4.1 MMOL/L — SIGNIFICANT CHANGE UP (ref 3.5–5.3)
POTASSIUM SERPL-SCNC: 3.3 MMOL/L
POTASSIUM SERPL-SCNC: 3.6 MMOL/L
POTASSIUM SERPL-SCNC: 3.7 MMOL/L — SIGNIFICANT CHANGE UP (ref 3.5–5.3)
POTASSIUM SERPL-SCNC: 3.8 MMOL/L — SIGNIFICANT CHANGE UP (ref 3.5–5.3)
POTASSIUM SERPL-SCNC: 3.9 MMOL/L
POTASSIUM SERPL-SCNC: 3.9 MMOL/L — SIGNIFICANT CHANGE UP (ref 3.5–5.3)
POTASSIUM SERPL-SCNC: 4 MMOL/L
POTASSIUM SERPL-SCNC: 4 MMOL/L — SIGNIFICANT CHANGE UP (ref 3.5–5.3)
POTASSIUM SERPL-SCNC: 4.1 MMOL/L — SIGNIFICANT CHANGE UP (ref 3.5–5.3)
POTASSIUM SERPL-SCNC: 4.1 MMOL/L — SIGNIFICANT CHANGE UP (ref 3.5–5.3)
PROT SERPL-MCNC: 6.3 G/DL
PROT SERPL-MCNC: 6.5 G/DL
PROT SERPL-MCNC: 6.8 G/DL
PROT SERPL-MCNC: 7 G/DL
PROT SERPL-MCNC: 7.7 G/DL — SIGNIFICANT CHANGE UP (ref 6–8.3)
PROTHROM AB SERPL-ACNC: 11.3 SEC — SIGNIFICANT CHANGE UP (ref 9.8–13.1)
PROTHROM AB SERPL-ACNC: 14.3 SEC — HIGH (ref 10.6–13.6)
RBC # BLD: 4.17 M/UL — LOW (ref 4.2–5.8)
RBC # BLD: 4.2 M/UL — SIGNIFICANT CHANGE UP (ref 4.2–5.8)
RBC # BLD: 4.28 M/UL — SIGNIFICANT CHANGE UP (ref 4.2–5.8)
RBC # BLD: 4.56 M/UL — SIGNIFICANT CHANGE UP (ref 4.2–5.8)
RBC # BLD: 4.59 M/UL — SIGNIFICANT CHANGE UP (ref 4.2–5.8)
RBC # BLD: 4.7 M/UL — SIGNIFICANT CHANGE UP (ref 4.2–5.8)
RBC # BLD: 4.73 M/UL — SIGNIFICANT CHANGE UP (ref 4.2–5.8)
RBC # BLD: 4.76 M/UL — SIGNIFICANT CHANGE UP (ref 4.2–5.8)
RBC # BLD: 4.86 M/UL — SIGNIFICANT CHANGE UP (ref 4.2–5.8)
RBC # BLD: 4.88 M/UL — SIGNIFICANT CHANGE UP (ref 4.2–5.8)
RBC # BLD: 4.9 M/UL — SIGNIFICANT CHANGE UP (ref 4.2–5.8)
RBC # BLD: 4.94 M/UL — SIGNIFICANT CHANGE UP (ref 4.2–5.8)
RBC # BLD: 4.99 M/UL — SIGNIFICANT CHANGE UP (ref 4.2–5.8)
RBC # BLD: 5.22 M/UL — SIGNIFICANT CHANGE UP (ref 4.2–5.8)
RBC # FLD: 13.2 % — SIGNIFICANT CHANGE UP (ref 10.3–14.5)
RBC # FLD: 13.2 % — SIGNIFICANT CHANGE UP (ref 10.3–14.5)
RBC # FLD: 13.7 % — SIGNIFICANT CHANGE UP (ref 10.3–14.5)
RBC # FLD: 13.7 % — SIGNIFICANT CHANGE UP (ref 10.3–14.5)
RBC # FLD: 13.8 % — SIGNIFICANT CHANGE UP (ref 10.3–14.5)
RBC # FLD: 13.9 % — SIGNIFICANT CHANGE UP (ref 10.3–14.5)
RBC # FLD: 14.2 % — SIGNIFICANT CHANGE UP (ref 10.3–14.5)
RBC # FLD: 14.4 % — SIGNIFICANT CHANGE UP (ref 10.3–14.5)
RBC # FLD: 14.5 % — SIGNIFICANT CHANGE UP (ref 10.3–14.5)
RBC # FLD: 14.5 % — SIGNIFICANT CHANGE UP (ref 10.3–14.5)
RBC # FLD: 14.9 % — HIGH (ref 10.3–14.5)
RBC # FLD: 15.3 % — HIGH (ref 10.3–14.5)
RBC # FLD: 16.6 % — HIGH (ref 10.3–14.5)
RBC # FLD: 17.4 % — HIGH (ref 10.3–14.5)
RBC BLD AUTO: SIGNIFICANT CHANGE UP
RBC BLD AUTO: SIGNIFICANT CHANGE UP
RH IG SCN BLD-IMP: NEGATIVE — SIGNIFICANT CHANGE UP
RH IG SCN BLD-IMP: NEGATIVE — SIGNIFICANT CHANGE UP
SAO2 % BLDA: >99 % — HIGH (ref 92–96)
SARS-COV-2 N GENE NPH QL NAA+PROBE: NOT DETECTED
SARS-COV-2 N GENE NPH QL NAA+PROBE: NOT DETECTED
SARS-COV-2 RNA SPEC QL NAA+PROBE: SIGNIFICANT CHANGE UP
SODIUM SERPL-SCNC: 136 MMOL/L — SIGNIFICANT CHANGE UP (ref 135–145)
SODIUM SERPL-SCNC: 137 MMOL/L — SIGNIFICANT CHANGE UP (ref 135–145)
SODIUM SERPL-SCNC: 140 MMOL/L
SODIUM SERPL-SCNC: 140 MMOL/L — SIGNIFICANT CHANGE UP (ref 135–145)
SODIUM SERPL-SCNC: 142 MMOL/L
SODIUM SERPL-SCNC: 142 MMOL/L
SODIUM SERPL-SCNC: 143 MMOL/L
SODIUM SERPL-SCNC: 143 MMOL/L — SIGNIFICANT CHANGE UP (ref 135–145)
SPECIMEN SOURCE: SIGNIFICANT CHANGE UP
SURGICAL PATHOLOGY STUDY: SIGNIFICANT CHANGE UP
TROPONIN I SERPL-MCNC: 0.03 NG/ML — SIGNIFICANT CHANGE UP (ref 0.02–0.06)
WBC # BLD: 10.07 K/UL — SIGNIFICANT CHANGE UP (ref 3.8–10.5)
WBC # BLD: 10.65 K/UL — HIGH (ref 3.8–10.5)
WBC # BLD: 11.08 K/UL — HIGH (ref 3.8–10.5)
WBC # BLD: 11.83 K/UL — HIGH (ref 3.8–10.5)
WBC # BLD: 12.02 K/UL — HIGH (ref 3.8–10.5)
WBC # BLD: 12.19 K/UL — HIGH (ref 3.8–10.5)
WBC # BLD: 13 K/UL — HIGH (ref 3.8–10.5)
WBC # BLD: 15.84 K/UL — HIGH (ref 3.8–10.5)
WBC # BLD: 5.04 K/UL — SIGNIFICANT CHANGE UP (ref 3.8–10.5)
WBC # BLD: 5.91 K/UL — SIGNIFICANT CHANGE UP (ref 3.8–10.5)
WBC # BLD: 6.81 K/UL — SIGNIFICANT CHANGE UP (ref 3.8–10.5)
WBC # BLD: 6.81 K/UL — SIGNIFICANT CHANGE UP (ref 3.8–10.5)
WBC # BLD: 6.83 K/UL — SIGNIFICANT CHANGE UP (ref 3.8–10.5)
WBC # BLD: 9.88 K/UL — SIGNIFICANT CHANGE UP (ref 3.8–10.5)
WBC # FLD AUTO: 10.07 K/UL — SIGNIFICANT CHANGE UP (ref 3.8–10.5)
WBC # FLD AUTO: 10.65 K/UL — HIGH (ref 3.8–10.5)
WBC # FLD AUTO: 11.08 K/UL — HIGH (ref 3.8–10.5)
WBC # FLD AUTO: 11.83 K/UL — HIGH (ref 3.8–10.5)
WBC # FLD AUTO: 12.02 K/UL — HIGH (ref 3.8–10.5)
WBC # FLD AUTO: 12.19 K/UL — HIGH (ref 3.8–10.5)
WBC # FLD AUTO: 13 K/UL — HIGH (ref 3.8–10.5)
WBC # FLD AUTO: 15.84 K/UL — HIGH (ref 3.8–10.5)
WBC # FLD AUTO: 5.04 K/UL — SIGNIFICANT CHANGE UP (ref 3.8–10.5)
WBC # FLD AUTO: 5.91 K/UL — SIGNIFICANT CHANGE UP (ref 3.8–10.5)
WBC # FLD AUTO: 6.81 K/UL — SIGNIFICANT CHANGE UP (ref 3.8–10.5)
WBC # FLD AUTO: 6.81 K/UL — SIGNIFICANT CHANGE UP (ref 3.8–10.5)
WBC # FLD AUTO: 6.83 K/UL — SIGNIFICANT CHANGE UP (ref 3.8–10.5)
WBC # FLD AUTO: 9.88 K/UL — SIGNIFICANT CHANGE UP (ref 3.8–10.5)

## 2020-01-01 PROCEDURE — 99024 POSTOP FOLLOW-UP VISIT: CPT

## 2020-01-01 PROCEDURE — 87040 BLOOD CULTURE FOR BACTERIA: CPT

## 2020-01-01 PROCEDURE — 83690 ASSAY OF LIPASE: CPT

## 2020-01-01 PROCEDURE — 99291 CRITICAL CARE FIRST HOUR: CPT | Mod: 25

## 2020-01-01 PROCEDURE — 99223 1ST HOSP IP/OBS HIGH 75: CPT

## 2020-01-01 PROCEDURE — 99205 OFFICE O/P NEW HI 60 MIN: CPT

## 2020-01-01 PROCEDURE — 83605 ASSAY OF LACTIC ACID: CPT

## 2020-01-01 PROCEDURE — 99213 OFFICE O/P EST LOW 20 MIN: CPT

## 2020-01-01 PROCEDURE — 71045 X-RAY EXAM CHEST 1 VIEW: CPT | Mod: 26

## 2020-01-01 PROCEDURE — 92960 CARDIOVERSION ELECTRIC EXT: CPT | Mod: 59

## 2020-01-01 PROCEDURE — 93005 ELECTROCARDIOGRAM TRACING: CPT

## 2020-01-01 PROCEDURE — 85025 COMPLETE CBC W/AUTO DIFF WBC: CPT

## 2020-01-01 PROCEDURE — 88334 PATH CONSLTJ SURG CYTO XM EA: CPT | Mod: 26,59

## 2020-01-01 PROCEDURE — 99215 OFFICE O/P EST HI 40 MIN: CPT

## 2020-01-01 PROCEDURE — 94729 DIFFUSING CAPACITY: CPT

## 2020-01-01 PROCEDURE — 93010 ELECTROCARDIOGRAM REPORT: CPT

## 2020-01-01 PROCEDURE — 99214 OFFICE O/P EST MOD 30 MIN: CPT

## 2020-01-01 PROCEDURE — 84484 ASSAY OF TROPONIN QUANT: CPT

## 2020-01-01 PROCEDURE — 94727 GAS DIL/WSHOT DETER LNG VOL: CPT

## 2020-01-01 PROCEDURE — 83735 ASSAY OF MAGNESIUM: CPT

## 2020-01-01 PROCEDURE — 88305 TISSUE EXAM BY PATHOLOGIST: CPT | Mod: 26

## 2020-01-01 PROCEDURE — 85379 FIBRIN DEGRADATION QUANT: CPT

## 2020-01-01 PROCEDURE — 88309 TISSUE EXAM BY PATHOLOGIST: CPT | Mod: 26

## 2020-01-01 PROCEDURE — 85730 THROMBOPLASTIN TIME PARTIAL: CPT

## 2020-01-01 PROCEDURE — 88342 IMHCHEM/IMCYTCHM 1ST ANTB: CPT | Mod: 26

## 2020-01-01 PROCEDURE — 96375 TX/PRO/DX INJ NEW DRUG ADDON: CPT | Mod: XU

## 2020-01-01 PROCEDURE — 36415 COLL VENOUS BLD VENIPUNCTURE: CPT

## 2020-01-01 PROCEDURE — 36600 WITHDRAWAL OF ARTERIAL BLOOD: CPT

## 2020-01-01 PROCEDURE — 94060 EVALUATION OF WHEEZING: CPT

## 2020-01-01 PROCEDURE — 87635 SARS-COV-2 COVID-19 AMP PRB: CPT

## 2020-01-01 PROCEDURE — 87186 SC STD MICRODIL/AGAR DIL: CPT

## 2020-01-01 PROCEDURE — 32674 THORACOSCOPY LYMPH NODE EXC: CPT

## 2020-01-01 PROCEDURE — 96374 THER/PROPH/DIAG INJ IV PUSH: CPT

## 2020-01-01 PROCEDURE — 88313 SPECIAL STAINS GROUP 2: CPT | Mod: 26

## 2020-01-01 PROCEDURE — 99205 OFFICE O/P NEW HI 60 MIN: CPT | Mod: 25

## 2020-01-01 PROCEDURE — 94760 N-INVAS EAR/PLS OXIMETRY 1: CPT

## 2020-01-01 PROCEDURE — 71045 X-RAY EXAM CHEST 1 VIEW: CPT

## 2020-01-01 PROCEDURE — 94660 CPAP INITIATION&MGMT: CPT

## 2020-01-01 PROCEDURE — 83880 ASSAY OF NATRIURETIC PEPTIDE: CPT

## 2020-01-01 PROCEDURE — 85610 PROTHROMBIN TIME: CPT

## 2020-01-01 PROCEDURE — 32663 THORACOSCOPY W/LOBECTOMY: CPT | Mod: RT

## 2020-01-01 PROCEDURE — 51702 INSERT TEMP BLADDER CATH: CPT

## 2020-01-01 PROCEDURE — 88331 PATH CONSLTJ SURG 1 BLK 1SPC: CPT | Mod: 26

## 2020-01-01 PROCEDURE — 88341 IMHCHEM/IMCYTCHM EA ADD ANTB: CPT | Mod: 26

## 2020-01-01 PROCEDURE — 80053 COMPREHEN METABOLIC PANEL: CPT

## 2020-01-01 PROCEDURE — 82803 BLOOD GASES ANY COMBINATION: CPT

## 2020-01-01 PROCEDURE — 82962 GLUCOSE BLOOD TEST: CPT

## 2020-01-01 PROCEDURE — 87150 DNA/RNA AMPLIFIED PROBE: CPT

## 2020-01-01 RX ORDER — HEPARIN SODIUM 5000 [USP'U]/ML
INJECTION INTRAVENOUS; SUBCUTANEOUS
Qty: 25000 | Refills: 0 | Status: DISCONTINUED | OUTPATIENT
Start: 2020-01-01 | End: 2020-01-01

## 2020-01-01 RX ORDER — CLOPIDOGREL BISULFATE 75 MG/1
75 TABLET, FILM COATED ORAL DAILY
Qty: 90 | Refills: 3 | Status: COMPLETED | COMMUNITY
Start: 2017-04-28 | End: 2020-01-01

## 2020-01-01 RX ORDER — ALBUMIN HUMAN 25 %
250 VIAL (ML) INTRAVENOUS ONCE
Refills: 0 | Status: COMPLETED | OUTPATIENT
Start: 2020-01-01 | End: 2020-01-01

## 2020-01-01 RX ORDER — ACETAMINOPHEN 500 MG
650 TABLET ORAL EVERY 6 HOURS
Refills: 0 | Status: COMPLETED | OUTPATIENT
Start: 2020-01-01 | End: 2020-01-01

## 2020-01-01 RX ORDER — HEPARIN SODIUM 5000 [USP'U]/ML
3000 INJECTION INTRAVENOUS; SUBCUTANEOUS EVERY 6 HOURS
Refills: 0 | Status: DISCONTINUED | OUTPATIENT
Start: 2020-01-01 | End: 2020-01-01

## 2020-01-01 RX ORDER — SODIUM CHLORIDE 9 MG/ML
300 INJECTION, SOLUTION INTRAVENOUS ONCE
Refills: 0 | Status: DISCONTINUED | OUTPATIENT
Start: 2020-01-01 | End: 2020-01-01

## 2020-01-01 RX ORDER — FENOFIBRATE,MICRONIZED 130 MG
1 CAPSULE ORAL
Qty: 0 | Refills: 0 | DISCHARGE

## 2020-01-01 RX ORDER — OXYCODONE HYDROCHLORIDE 5 MG/1
10 TABLET ORAL
Refills: 0 | Status: DISCONTINUED | OUTPATIENT
Start: 2020-01-01 | End: 2020-01-01

## 2020-01-01 RX ORDER — SODIUM CHLORIDE 9 MG/ML
1000 INJECTION INTRAMUSCULAR; INTRAVENOUS; SUBCUTANEOUS ONCE
Refills: 0 | Status: COMPLETED | OUTPATIENT
Start: 2020-01-01 | End: 2020-01-01

## 2020-01-01 RX ORDER — ATORVASTATIN CALCIUM 80 MG/1
20 TABLET, FILM COATED ORAL AT BEDTIME
Refills: 0 | Status: DISCONTINUED | OUTPATIENT
Start: 2020-01-01 | End: 2020-01-01

## 2020-01-01 RX ORDER — VANCOMYCIN HCL 1 G
VIAL (EA) INTRAVENOUS
Refills: 0 | Status: DISCONTINUED | OUTPATIENT
Start: 2020-01-01 | End: 2020-01-01

## 2020-01-01 RX ORDER — FOLIC ACID 1 MG
1 TABLET ORAL DAILY
Qty: 30 | Refills: 3 | Status: ACTIVE | COMMUNITY
Start: 2020-01-01 | End: 1900-01-01

## 2020-01-01 RX ORDER — INSULIN LISPRO 100/ML
VIAL (ML) SUBCUTANEOUS
Refills: 0 | Status: DISCONTINUED | OUTPATIENT
Start: 2020-01-01 | End: 2020-01-01

## 2020-01-01 RX ORDER — SODIUM CHLORIDE 9 MG/ML
1000 INJECTION, SOLUTION INTRAVENOUS
Refills: 0 | Status: DISCONTINUED | OUTPATIENT
Start: 2020-01-01 | End: 2020-01-01

## 2020-01-01 RX ORDER — GLUCAGON INJECTION, SOLUTION 0.5 MG/.1ML
1 INJECTION, SOLUTION SUBCUTANEOUS ONCE
Refills: 0 | Status: DISCONTINUED | OUTPATIENT
Start: 2020-01-01 | End: 2020-01-01

## 2020-01-01 RX ORDER — ATORVASTATIN CALCIUM 80 MG/1
1 TABLET, FILM COATED ORAL
Qty: 0 | Refills: 0 | DISCHARGE

## 2020-01-01 RX ORDER — OXYCODONE HYDROCHLORIDE 5 MG/1
2 TABLET ORAL
Qty: 40 | Refills: 0
Start: 2020-01-01 | End: 2020-01-01

## 2020-01-01 RX ORDER — FAMOTIDINE 10 MG/1
TABLET, FILM COATED ORAL
Refills: 0 | Status: ACTIVE | COMMUNITY

## 2020-01-01 RX ORDER — PIPERACILLIN AND TAZOBACTAM 4; .5 G/20ML; G/20ML
3.38 INJECTION, POWDER, LYOPHILIZED, FOR SOLUTION INTRAVENOUS ONCE
Refills: 0 | Status: DISCONTINUED | OUTPATIENT
Start: 2020-01-01 | End: 2020-01-01

## 2020-01-01 RX ORDER — ACETAMINOPHEN 500 MG
1000 TABLET ORAL ONCE
Refills: 0 | Status: COMPLETED | OUTPATIENT
Start: 2020-01-01 | End: 2020-01-01

## 2020-01-01 RX ORDER — VANCOMYCIN HCL 1 G
1000 VIAL (EA) INTRAVENOUS ONCE
Refills: 0 | Status: DISCONTINUED | OUTPATIENT
Start: 2020-01-01 | End: 2020-01-01

## 2020-01-01 RX ORDER — INSULIN LISPRO 100/ML
VIAL (ML) SUBCUTANEOUS EVERY 6 HOURS
Refills: 0 | Status: DISCONTINUED | OUTPATIENT
Start: 2020-01-01 | End: 2020-01-01

## 2020-01-01 RX ORDER — DEXTROSE 50 % IN WATER 50 %
25 SYRINGE (ML) INTRAVENOUS ONCE
Refills: 0 | Status: DISCONTINUED | OUTPATIENT
Start: 2020-01-01 | End: 2020-01-01

## 2020-01-01 RX ORDER — PANTOPRAZOLE 40 MG/1
40 TABLET, DELAYED RELEASE ORAL
Refills: 0 | Status: COMPLETED | COMMUNITY
End: 2020-01-01

## 2020-01-01 RX ORDER — AMLODIPINE BESYLATE 2.5 MG/1
1 TABLET ORAL
Qty: 0 | Refills: 0 | DISCHARGE

## 2020-01-01 RX ORDER — HEPARIN SODIUM 5000 [USP'U]/ML
6000 INJECTION INTRAVENOUS; SUBCUTANEOUS EVERY 6 HOURS
Refills: 0 | Status: DISCONTINUED | OUTPATIENT
Start: 2020-01-01 | End: 2020-01-01

## 2020-01-01 RX ORDER — HYDROMORPHONE HYDROCHLORIDE 2 MG/ML
30 INJECTION INTRAMUSCULAR; INTRAVENOUS; SUBCUTANEOUS
Refills: 0 | Status: DISCONTINUED | OUTPATIENT
Start: 2020-01-01 | End: 2020-01-01

## 2020-01-01 RX ORDER — DEXMEDETOMIDINE HYDROCHLORIDE IN 0.9% SODIUM CHLORIDE 4 UG/ML
0.2 INJECTION INTRAVENOUS
Qty: 200 | Refills: 0 | Status: DISCONTINUED | OUTPATIENT
Start: 2020-01-01 | End: 2020-01-01

## 2020-01-01 RX ORDER — FOLIC ACID 0.8 MG
1 TABLET ORAL DAILY
Refills: 0 | Status: DISCONTINUED | OUTPATIENT
Start: 2020-01-01 | End: 2020-01-01

## 2020-01-01 RX ORDER — FENOFIBRIC ACID 135 MG/1
135 CAPSULE, DELAYED RELEASE ORAL
Refills: 0 | Status: COMPLETED | COMMUNITY
End: 2020-01-01

## 2020-01-01 RX ORDER — METOPROLOL TARTRATE 50 MG
50 TABLET ORAL DAILY
Refills: 0 | Status: DISCONTINUED | OUTPATIENT
Start: 2020-01-01 | End: 2020-01-01

## 2020-01-01 RX ORDER — CEFEPIME 1 G/1
2000 INJECTION, POWDER, FOR SOLUTION INTRAMUSCULAR; INTRAVENOUS ONCE
Refills: 0 | Status: COMPLETED | OUTPATIENT
Start: 2020-01-01 | End: 2020-01-01

## 2020-01-01 RX ORDER — ONDANSETRON 8 MG/1
4 TABLET, FILM COATED ORAL ONCE
Refills: 0 | Status: DISCONTINUED | OUTPATIENT
Start: 2020-01-01 | End: 2020-01-01

## 2020-01-01 RX ORDER — ACETAMINOPHEN 500 MG
975 TABLET ORAL ONCE
Refills: 0 | Status: COMPLETED | OUTPATIENT
Start: 2020-01-01 | End: 2020-01-01

## 2020-01-01 RX ORDER — LOSARTAN POTASSIUM 100 MG/1
1 TABLET, FILM COATED ORAL
Qty: 0 | Refills: 0 | DISCHARGE

## 2020-01-01 RX ORDER — ASPIRIN/CALCIUM CARB/MAGNESIUM 324 MG
81 TABLET ORAL DAILY
Refills: 0 | Status: DISCONTINUED | OUTPATIENT
Start: 2020-01-01 | End: 2020-01-01

## 2020-01-01 RX ORDER — RANITIDINE HYDROCHLORIDE 150 MG/1
1 TABLET, FILM COATED ORAL
Qty: 0 | Refills: 0 | DISCHARGE

## 2020-01-01 RX ORDER — TAMSULOSIN HYDROCHLORIDE 0.4 MG/1
1 CAPSULE ORAL
Qty: 0 | Refills: 0 | DISCHARGE

## 2020-01-01 RX ORDER — FUROSEMIDE 40 MG
40 TABLET ORAL ONCE
Refills: 0 | Status: COMPLETED | OUTPATIENT
Start: 2020-01-01 | End: 2020-01-01

## 2020-01-01 RX ORDER — HYDRALAZINE HCL 50 MG
1 TABLET ORAL
Qty: 0 | Refills: 0 | DISCHARGE

## 2020-01-01 RX ORDER — HEPARIN SODIUM 5000 [USP'U]/ML
5000 INJECTION INTRAVENOUS; SUBCUTANEOUS EVERY 8 HOURS
Refills: 0 | Status: DISCONTINUED | OUTPATIENT
Start: 2020-01-01 | End: 2020-01-01

## 2020-01-01 RX ORDER — ALBUTEROL 90 UG/1
2 AEROSOL, METERED ORAL EVERY 4 HOURS
Refills: 0 | Status: DISCONTINUED | OUTPATIENT
Start: 2020-01-01 | End: 2020-01-01

## 2020-01-01 RX ORDER — SUCRALFATE 1 G/1
1 TABLET ORAL
Refills: 0 | Status: COMPLETED | COMMUNITY
End: 2020-01-01

## 2020-01-01 RX ORDER — CHLORHEXIDINE GLUCONATE 213 G/1000ML
1 SOLUTION TOPICAL
Refills: 0 | Status: DISCONTINUED | OUTPATIENT
Start: 2020-01-01 | End: 2020-01-01

## 2020-01-01 RX ORDER — TAMSULOSIN HYDROCHLORIDE 0.4 MG/1
0.4 CAPSULE ORAL AT BEDTIME
Refills: 0 | Status: DISCONTINUED | OUTPATIENT
Start: 2020-01-01 | End: 2020-01-01

## 2020-01-01 RX ORDER — PIPERACILLIN AND TAZOBACTAM 4; .5 G/20ML; G/20ML
3.38 INJECTION, POWDER, LYOPHILIZED, FOR SOLUTION INTRAVENOUS EVERY 8 HOURS
Refills: 0 | Status: DISCONTINUED | OUTPATIENT
Start: 2020-01-01 | End: 2020-01-01

## 2020-01-01 RX ORDER — HYDROMORPHONE HYDROCHLORIDE 2 MG/ML
1 INJECTION INTRAMUSCULAR; INTRAVENOUS; SUBCUTANEOUS
Qty: 100 | Refills: 0 | Status: DISCONTINUED | OUTPATIENT
Start: 2020-01-01 | End: 2020-01-01

## 2020-01-01 RX ORDER — SODIUM CHLORIDE 9 MG/ML
1000 INJECTION, SOLUTION INTRAVENOUS ONCE
Refills: 0 | Status: COMPLETED | OUTPATIENT
Start: 2020-01-01 | End: 2020-01-01

## 2020-01-01 RX ORDER — DOCUSATE SODIUM 100 MG
1 CAPSULE ORAL
Qty: 0 | Refills: 0 | DISCHARGE

## 2020-01-01 RX ORDER — APIXABAN 2.5 MG/1
5 TABLET, FILM COATED ORAL EVERY 12 HOURS
Refills: 0 | Status: DISCONTINUED | OUTPATIENT
Start: 2020-01-01 | End: 2020-01-01

## 2020-01-01 RX ORDER — METOCLOPRAMIDE 10 MG/1
10 TABLET ORAL
Qty: 60 | Refills: 5 | Status: ACTIVE | COMMUNITY
Start: 2020-01-01 | End: 1900-01-01

## 2020-01-01 RX ORDER — APIXABAN 2.5 MG/1
1 TABLET, FILM COATED ORAL
Qty: 60 | Refills: 0
Start: 2020-01-01 | End: 2020-01-01

## 2020-01-01 RX ORDER — ASPIRIN 81 MG/1
TABLET ORAL
Refills: 0 | Status: ACTIVE | COMMUNITY

## 2020-01-01 RX ORDER — FUROSEMIDE 40 MG
1 TABLET ORAL
Qty: 0 | Refills: 0 | DISCHARGE

## 2020-01-01 RX ORDER — LIDOCAINE 4 G/100G
1 CREAM TOPICAL EVERY 24 HOURS
Refills: 0 | Status: DISCONTINUED | OUTPATIENT
Start: 2020-01-01 | End: 2020-01-01

## 2020-01-01 RX ORDER — VANCOMYCIN HCL 1 G
1000 VIAL (EA) INTRAVENOUS ONCE
Refills: 0 | Status: COMPLETED | OUTPATIENT
Start: 2020-01-01 | End: 2020-01-01

## 2020-01-01 RX ORDER — LOSARTAN POTASSIUM 100 MG/1
TABLET, FILM COATED ORAL
Refills: 0 | Status: ACTIVE | COMMUNITY

## 2020-01-01 RX ORDER — FAMOTIDINE 10 MG/ML
40 INJECTION INTRAVENOUS
Qty: 0 | Refills: 0 | DISCHARGE

## 2020-01-01 RX ORDER — FENTANYL CITRATE 50 UG/ML
50 INJECTION INTRAVENOUS
Refills: 0 | Status: DISCONTINUED | OUTPATIENT
Start: 2020-01-01 | End: 2020-01-01

## 2020-01-01 RX ORDER — ATROPINE SULFATE 1 %
1 DROPS OPHTHALMIC (EYE) EVERY 4 HOURS
Refills: 0 | Status: DISCONTINUED | OUTPATIENT
Start: 2020-01-01 | End: 2020-01-01

## 2020-01-01 RX ORDER — POTASSIUM CHLORIDE 20 MEQ
20 PACKET (EA) ORAL ONCE
Refills: 0 | Status: COMPLETED | OUTPATIENT
Start: 2020-01-01 | End: 2020-01-01

## 2020-01-01 RX ORDER — OXYCODONE HYDROCHLORIDE 5 MG/1
2 TABLET ORAL
Qty: 0 | Refills: 0 | DISCHARGE
Start: 2020-01-01

## 2020-01-01 RX ORDER — DEXAMETHASONE 4 MG/1
4 TABLET ORAL
Qty: 30 | Refills: 1 | Status: ACTIVE | COMMUNITY
Start: 2020-01-01 | End: 1900-01-01

## 2020-01-01 RX ORDER — CLOPIDOGREL BISULFATE 75 MG/1
1 TABLET, FILM COATED ORAL
Qty: 0 | Refills: 0 | DISCHARGE

## 2020-01-01 RX ORDER — APIXABAN 5 MG/1
TABLET, FILM COATED ORAL
Refills: 0 | Status: ACTIVE | COMMUNITY

## 2020-01-01 RX ORDER — HYDROMORPHONE HYDROCHLORIDE 2 MG/ML
0.5 INJECTION INTRAMUSCULAR; INTRAVENOUS; SUBCUTANEOUS
Refills: 0 | Status: DISCONTINUED | OUTPATIENT
Start: 2020-01-01 | End: 2020-01-01

## 2020-01-01 RX ORDER — DEXTROSE 50 % IN WATER 50 %
12.5 SYRINGE (ML) INTRAVENOUS ONCE
Refills: 0 | Status: DISCONTINUED | OUTPATIENT
Start: 2020-01-01 | End: 2020-01-01

## 2020-01-01 RX ORDER — SENNA PLUS 8.6 MG/1
2 TABLET ORAL
Qty: 0 | Refills: 0 | DISCHARGE
Start: 2020-01-01

## 2020-01-01 RX ORDER — POLYETHYLENE GLYCOL-3350 AND ELECTROLYTES 236; 6.74; 5.86; 2.97; 22.74 G/274.31G; G/274.31G; G/274.31G; G/274.31G; G/274.31G
236 POWDER, FOR SOLUTION ORAL
Refills: 0 | Status: COMPLETED | COMMUNITY
End: 2020-01-01

## 2020-01-01 RX ORDER — ASPIRIN/CALCIUM CARB/MAGNESIUM 324 MG
1 TABLET ORAL
Qty: 0 | Refills: 0 | DISCHARGE

## 2020-01-01 RX ORDER — VANCOMYCIN HCL 1 G
1000 VIAL (EA) INTRAVENOUS EVERY 24 HOURS
Refills: 0 | Status: DISCONTINUED | OUTPATIENT
Start: 2020-01-01 | End: 2020-01-01

## 2020-01-01 RX ORDER — SENNA PLUS 8.6 MG/1
2 TABLET ORAL AT BEDTIME
Refills: 0 | Status: DISCONTINUED | OUTPATIENT
Start: 2020-01-01 | End: 2020-01-01

## 2020-01-01 RX ORDER — AMLODIPINE VALSARTAN AND HYDROCHLOROTHIAZIDE 10; 320; 25 MG/1; MG/1; MG/1
10-320-25 TABLET, FILM COATED ORAL
Refills: 0 | Status: COMPLETED | COMMUNITY
End: 2020-01-01

## 2020-01-01 RX ORDER — ATORVASTATIN CALCIUM 80 MG/1
TABLET, FILM COATED ORAL
Refills: 0 | Status: ACTIVE | COMMUNITY

## 2020-01-01 RX ORDER — METOPROLOL TARTRATE 50 MG
1 TABLET ORAL
Qty: 0 | Refills: 0 | DISCHARGE

## 2020-01-01 RX ORDER — HYDROMORPHONE HYDROCHLORIDE 2 MG/ML
1 INJECTION INTRAMUSCULAR; INTRAVENOUS; SUBCUTANEOUS EVERY 4 HOURS
Refills: 0 | Status: DISCONTINUED | OUTPATIENT
Start: 2020-01-01 | End: 2020-01-01

## 2020-01-01 RX ORDER — ONDANSETRON 8 MG/1
4 TABLET, FILM COATED ORAL EVERY 6 HOURS
Refills: 0 | Status: DISCONTINUED | OUTPATIENT
Start: 2020-01-01 | End: 2020-01-01

## 2020-01-01 RX ORDER — NALOXONE HYDROCHLORIDE 4 MG/.1ML
0.1 SPRAY NASAL
Refills: 0 | Status: DISCONTINUED | OUTPATIENT
Start: 2020-01-01 | End: 2020-01-01

## 2020-01-01 RX ORDER — APIXABAN 2.5 MG/1
1 TABLET, FILM COATED ORAL
Qty: 0 | Refills: 0 | DISCHARGE
Start: 2020-01-01

## 2020-01-01 RX ORDER — DIPHENHYDRAMINE HCL 50 MG
25 CAPSULE ORAL EVERY 4 HOURS
Refills: 0 | Status: DISCONTINUED | OUTPATIENT
Start: 2020-01-01 | End: 2020-01-01

## 2020-01-01 RX ORDER — GABAPENTIN 400 MG/1
100 CAPSULE ORAL ONCE
Refills: 0 | Status: COMPLETED | OUTPATIENT
Start: 2020-01-01 | End: 2020-01-01

## 2020-01-01 RX ORDER — DEXTROSE 50 % IN WATER 50 %
15 SYRINGE (ML) INTRAVENOUS ONCE
Refills: 0 | Status: DISCONTINUED | OUTPATIENT
Start: 2020-01-01 | End: 2020-01-01

## 2020-01-01 RX ORDER — FUROSEMIDE 80 MG/1
TABLET ORAL
Refills: 0 | Status: ACTIVE | COMMUNITY

## 2020-01-01 RX ORDER — FAMOTIDINE 10 MG/ML
20 INJECTION INTRAVENOUS DAILY
Refills: 0 | Status: DISCONTINUED | OUTPATIENT
Start: 2020-01-01 | End: 2020-01-01

## 2020-01-01 RX ORDER — HYDROMORPHONE HYDROCHLORIDE 2 MG/ML
1 INJECTION INTRAMUSCULAR; INTRAVENOUS; SUBCUTANEOUS
Refills: 0 | Status: DISCONTINUED | OUTPATIENT
Start: 2020-01-01 | End: 2020-01-01

## 2020-01-01 RX ORDER — FUROSEMIDE 40 MG
20 TABLET ORAL ONCE
Refills: 0 | Status: COMPLETED | OUTPATIENT
Start: 2020-01-01 | End: 2020-01-01

## 2020-01-01 RX ORDER — CLOPIDOGREL BISULFATE 75 MG/1
75 TABLET, FILM COATED ORAL DAILY
Refills: 0 | Status: DISCONTINUED | OUTPATIENT
Start: 2020-01-01 | End: 2020-01-01

## 2020-01-01 RX ORDER — IPRATROPIUM/ALBUTEROL SULFATE 18-103MCG
3 AEROSOL WITH ADAPTER (GRAM) INHALATION EVERY 6 HOURS
Refills: 0 | Status: DISCONTINUED | OUTPATIENT
Start: 2020-01-01 | End: 2020-01-01

## 2020-01-01 RX ORDER — AMLODIPINE BESYLATE 5 MG/1
TABLET ORAL
Refills: 0 | Status: ACTIVE | COMMUNITY

## 2020-01-01 RX ORDER — OXYCODONE HYDROCHLORIDE 5 MG/1
5 TABLET ORAL
Refills: 0 | Status: DISCONTINUED | OUTPATIENT
Start: 2020-01-01 | End: 2020-01-01

## 2020-01-01 RX ORDER — ACETAMINOPHEN 500 MG
2 TABLET ORAL
Qty: 0 | Refills: 0 | DISCHARGE

## 2020-01-01 RX ORDER — LOSARTAN POTASSIUM 100 MG/1
50 TABLET, FILM COATED ORAL DAILY
Refills: 0 | Status: DISCONTINUED | OUTPATIENT
Start: 2020-01-01 | End: 2020-01-01

## 2020-01-01 RX ADMIN — SENNA PLUS 2 TABLET(S): 8.6 TABLET ORAL at 22:27

## 2020-01-01 RX ADMIN — SODIUM CHLORIDE 1000 MILLILITER(S): 9 INJECTION INTRAMUSCULAR; INTRAVENOUS; SUBCUTANEOUS at 05:10

## 2020-01-01 RX ADMIN — Medication 650 MILLIGRAM(S): at 17:31

## 2020-01-01 RX ADMIN — Medication 3 MILLILITER(S): at 15:08

## 2020-01-01 RX ADMIN — Medication 650 MILLIGRAM(S): at 05:31

## 2020-01-01 RX ADMIN — Medication 81 MILLIGRAM(S): at 15:25

## 2020-01-01 RX ADMIN — Medication 3 MILLILITER(S): at 10:51

## 2020-01-01 RX ADMIN — Medication 20 MILLIGRAM(S): at 15:25

## 2020-01-01 RX ADMIN — Medication 2 MILLIGRAM(S): at 07:35

## 2020-01-01 RX ADMIN — Medication 1: at 12:18

## 2020-01-01 RX ADMIN — LOSARTAN POTASSIUM 50 MILLIGRAM(S): 100 TABLET, FILM COATED ORAL at 05:31

## 2020-01-01 RX ADMIN — Medication 50 MILLIGRAM(S): at 05:31

## 2020-01-01 RX ADMIN — HYDROMORPHONE HYDROCHLORIDE 30 MILLILITER(S): 2 INJECTION INTRAMUSCULAR; INTRAVENOUS; SUBCUTANEOUS at 07:15

## 2020-01-01 RX ADMIN — SENNA PLUS 2 TABLET(S): 8.6 TABLET ORAL at 21:34

## 2020-01-01 RX ADMIN — HYDROMORPHONE HYDROCHLORIDE 1 MILLIGRAM(S): 2 INJECTION INTRAMUSCULAR; INTRAVENOUS; SUBCUTANEOUS at 12:30

## 2020-01-01 RX ADMIN — Medication 3 MILLILITER(S): at 03:41

## 2020-01-01 RX ADMIN — SENNA PLUS 2 TABLET(S): 8.6 TABLET ORAL at 22:06

## 2020-01-01 RX ADMIN — HEPARIN SODIUM 5000 UNIT(S): 5000 INJECTION INTRAVENOUS; SUBCUTANEOUS at 21:34

## 2020-01-01 RX ADMIN — Medication 1 MILLIGRAM(S): at 06:57

## 2020-01-01 RX ADMIN — HYDROMORPHONE HYDROCHLORIDE 1 MG/HR: 2 INJECTION INTRAMUSCULAR; INTRAVENOUS; SUBCUTANEOUS at 07:22

## 2020-01-01 RX ADMIN — Medication 50 MILLIGRAM(S): at 18:49

## 2020-01-01 RX ADMIN — Medication 1 DROP(S): at 21:49

## 2020-01-01 RX ADMIN — Medication 400 MILLIGRAM(S): at 15:25

## 2020-01-01 RX ADMIN — SODIUM CHLORIDE 1000 MILLILITER(S): 9 INJECTION INTRAMUSCULAR; INTRAVENOUS; SUBCUTANEOUS at 04:12

## 2020-01-01 RX ADMIN — HEPARIN SODIUM 5000 UNIT(S): 5000 INJECTION INTRAVENOUS; SUBCUTANEOUS at 13:19

## 2020-01-01 RX ADMIN — Medication 650 MILLIGRAM(S): at 06:26

## 2020-01-01 RX ADMIN — ATORVASTATIN CALCIUM 20 MILLIGRAM(S): 80 TABLET, FILM COATED ORAL at 22:05

## 2020-01-01 RX ADMIN — LIDOCAINE 1 PATCH: 4 CREAM TOPICAL at 17:32

## 2020-01-01 RX ADMIN — DEXMEDETOMIDINE HYDROCHLORIDE IN 0.9% SODIUM CHLORIDE 3.77 MICROGRAM(S)/KG/HR: 4 INJECTION INTRAVENOUS at 05:27

## 2020-01-01 RX ADMIN — HYDROMORPHONE HYDROCHLORIDE 30 MILLILITER(S): 2 INJECTION INTRAMUSCULAR; INTRAVENOUS; SUBCUTANEOUS at 18:09

## 2020-01-01 RX ADMIN — HYDROMORPHONE HYDROCHLORIDE 1 MILLIGRAM(S): 2 INJECTION INTRAMUSCULAR; INTRAVENOUS; SUBCUTANEOUS at 08:00

## 2020-01-01 RX ADMIN — Medication 650 MILLIGRAM(S): at 12:32

## 2020-01-01 RX ADMIN — LIDOCAINE 1 PATCH: 4 CREAM TOPICAL at 12:29

## 2020-01-01 RX ADMIN — HYDROMORPHONE HYDROCHLORIDE 1 MILLIGRAM(S): 2 INJECTION INTRAMUSCULAR; INTRAVENOUS; SUBCUTANEOUS at 12:28

## 2020-01-01 RX ADMIN — GABAPENTIN 100 MILLIGRAM(S): 400 CAPSULE ORAL at 09:59

## 2020-01-01 RX ADMIN — Medication 3 MILLILITER(S): at 22:23

## 2020-01-01 RX ADMIN — Medication 125 MILLILITER(S): at 16:27

## 2020-01-01 RX ADMIN — HYDROMORPHONE HYDROCHLORIDE 1 MILLIGRAM(S): 2 INJECTION INTRAMUSCULAR; INTRAVENOUS; SUBCUTANEOUS at 15:23

## 2020-01-01 RX ADMIN — LIDOCAINE 1 PATCH: 4 CREAM TOPICAL at 00:28

## 2020-01-01 RX ADMIN — HEPARIN SODIUM 5000 UNIT(S): 5000 INJECTION INTRAVENOUS; SUBCUTANEOUS at 14:13

## 2020-01-01 RX ADMIN — TAMSULOSIN HYDROCHLORIDE 0.4 MILLIGRAM(S): 0.4 CAPSULE ORAL at 21:33

## 2020-01-01 RX ADMIN — HEPARIN SODIUM 5000 UNIT(S): 5000 INJECTION INTRAVENOUS; SUBCUTANEOUS at 04:53

## 2020-01-01 RX ADMIN — Medication 650 MILLIGRAM(S): at 00:27

## 2020-01-01 RX ADMIN — LIDOCAINE 1 PATCH: 4 CREAM TOPICAL at 00:11

## 2020-01-01 RX ADMIN — ATORVASTATIN CALCIUM 20 MILLIGRAM(S): 80 TABLET, FILM COATED ORAL at 21:33

## 2020-01-01 RX ADMIN — Medication 1 MILLIGRAM(S): at 11:00

## 2020-01-01 RX ADMIN — TAMSULOSIN HYDROCHLORIDE 0.4 MILLIGRAM(S): 0.4 CAPSULE ORAL at 22:06

## 2020-01-01 RX ADMIN — FAMOTIDINE 20 MILLIGRAM(S): 10 INJECTION INTRAVENOUS at 12:18

## 2020-01-01 RX ADMIN — APIXABAN 5 MILLIGRAM(S): 2.5 TABLET, FILM COATED ORAL at 18:28

## 2020-01-01 RX ADMIN — Medication 3 MILLILITER(S): at 22:20

## 2020-01-01 RX ADMIN — HYDROMORPHONE HYDROCHLORIDE 1 MG/HR: 2 INJECTION INTRAMUSCULAR; INTRAVENOUS; SUBCUTANEOUS at 08:00

## 2020-01-01 RX ADMIN — HYDROMORPHONE HYDROCHLORIDE 30 MILLILITER(S): 2 INJECTION INTRAMUSCULAR; INTRAVENOUS; SUBCUTANEOUS at 15:25

## 2020-01-01 RX ADMIN — Medication 3 MILLILITER(S): at 10:29

## 2020-01-01 RX ADMIN — Medication 650 MILLIGRAM(S): at 00:28

## 2020-01-01 RX ADMIN — Medication 975 MILLIGRAM(S): at 09:59

## 2020-01-01 RX ADMIN — Medication 3 MILLILITER(S): at 10:22

## 2020-01-01 RX ADMIN — TAMSULOSIN HYDROCHLORIDE 0.4 MILLIGRAM(S): 0.4 CAPSULE ORAL at 22:27

## 2020-01-01 RX ADMIN — SODIUM CHLORIDE 1000 MILLILITER(S): 9 INJECTION, SOLUTION INTRAVENOUS at 05:10

## 2020-01-01 RX ADMIN — HEPARIN SODIUM 5000 UNIT(S): 5000 INJECTION INTRAVENOUS; SUBCUTANEOUS at 22:26

## 2020-01-01 RX ADMIN — HYDROMORPHONE HYDROCHLORIDE 1 MILLIGRAM(S): 2 INJECTION INTRAMUSCULAR; INTRAVENOUS; SUBCUTANEOUS at 15:30

## 2020-01-01 RX ADMIN — SODIUM CHLORIDE 30 MILLILITER(S): 9 INJECTION, SOLUTION INTRAVENOUS at 08:26

## 2020-01-01 RX ADMIN — HYDROMORPHONE HYDROCHLORIDE 1 MILLIGRAM(S): 2 INJECTION INTRAMUSCULAR; INTRAVENOUS; SUBCUTANEOUS at 07:49

## 2020-01-01 RX ADMIN — HYDROMORPHONE HYDROCHLORIDE 1 MILLIGRAM(S): 2 INJECTION INTRAMUSCULAR; INTRAVENOUS; SUBCUTANEOUS at 14:03

## 2020-01-01 RX ADMIN — LIDOCAINE 1 PATCH: 4 CREAM TOPICAL at 20:15

## 2020-01-01 RX ADMIN — TAMSULOSIN HYDROCHLORIDE 0.4 MILLIGRAM(S): 0.4 CAPSULE ORAL at 21:51

## 2020-01-01 RX ADMIN — Medication 3 MILLILITER(S): at 15:14

## 2020-01-01 RX ADMIN — HYDROMORPHONE HYDROCHLORIDE 30 MILLILITER(S): 2 INJECTION INTRAMUSCULAR; INTRAVENOUS; SUBCUTANEOUS at 19:42

## 2020-01-01 RX ADMIN — ATORVASTATIN CALCIUM 20 MILLIGRAM(S): 80 TABLET, FILM COATED ORAL at 21:51

## 2020-01-01 RX ADMIN — HEPARIN SODIUM 5000 UNIT(S): 5000 INJECTION INTRAVENOUS; SUBCUTANEOUS at 05:51

## 2020-01-01 RX ADMIN — HYDROMORPHONE HYDROCHLORIDE 30 MILLILITER(S): 2 INJECTION INTRAMUSCULAR; INTRAVENOUS; SUBCUTANEOUS at 07:31

## 2020-01-01 RX ADMIN — Medication 20 MILLIEQUIVALENT(S): at 08:34

## 2020-01-01 RX ADMIN — APIXABAN 5 MILLIGRAM(S): 2.5 TABLET, FILM COATED ORAL at 15:25

## 2020-01-01 RX ADMIN — APIXABAN 5 MILLIGRAM(S): 2.5 TABLET, FILM COATED ORAL at 06:26

## 2020-01-01 RX ADMIN — Medication 3 MILLILITER(S): at 15:13

## 2020-01-01 RX ADMIN — CEFEPIME 100 MILLIGRAM(S): 1 INJECTION, POWDER, FOR SOLUTION INTRAMUSCULAR; INTRAVENOUS at 04:11

## 2020-01-01 RX ADMIN — Medication 3 MILLILITER(S): at 21:37

## 2020-01-01 RX ADMIN — HYDROMORPHONE HYDROCHLORIDE 0.5 MILLIGRAM(S): 2 INJECTION INTRAMUSCULAR; INTRAVENOUS; SUBCUTANEOUS at 18:12

## 2020-01-01 RX ADMIN — Medication 40 MILLIGRAM(S): at 10:20

## 2020-01-01 RX ADMIN — SODIUM CHLORIDE 30 MILLILITER(S): 9 INJECTION, SOLUTION INTRAVENOUS at 09:59

## 2020-01-01 RX ADMIN — Medication 3 MILLILITER(S): at 04:09

## 2020-01-01 RX ADMIN — Medication 50 MILLIGRAM(S): at 08:34

## 2020-01-01 RX ADMIN — APIXABAN 5 MILLIGRAM(S): 2.5 TABLET, FILM COATED ORAL at 05:31

## 2020-01-01 RX ADMIN — LIDOCAINE 1 PATCH: 4 CREAM TOPICAL at 12:45

## 2020-01-01 RX ADMIN — Medication 650 MILLIGRAM(S): at 13:31

## 2020-01-01 RX ADMIN — Medication 650 MILLIGRAM(S): at 18:28

## 2020-01-01 RX ADMIN — Medication 250 MILLIGRAM(S): at 04:35

## 2020-01-01 RX ADMIN — Medication 400 MILLIGRAM(S): at 08:26

## 2020-01-01 RX ADMIN — Medication 3 MILLILITER(S): at 03:46

## 2020-01-01 RX ADMIN — FAMOTIDINE 20 MILLIGRAM(S): 10 INJECTION INTRAVENOUS at 13:31

## 2020-01-01 RX ADMIN — Medication 81 MILLIGRAM(S): at 13:31

## 2020-01-01 RX ADMIN — HYDROMORPHONE HYDROCHLORIDE 1 MILLIGRAM(S): 2 INJECTION INTRAMUSCULAR; INTRAVENOUS; SUBCUTANEOUS at 13:56

## 2020-01-01 RX ADMIN — HYDROMORPHONE HYDROCHLORIDE 30 MILLILITER(S): 2 INJECTION INTRAMUSCULAR; INTRAVENOUS; SUBCUTANEOUS at 19:16

## 2020-01-01 RX ADMIN — HYDROMORPHONE HYDROCHLORIDE 0.5 MILLIGRAM(S): 2 INJECTION INTRAMUSCULAR; INTRAVENOUS; SUBCUTANEOUS at 15:27

## 2020-01-01 RX ADMIN — ATORVASTATIN CALCIUM 20 MILLIGRAM(S): 80 TABLET, FILM COATED ORAL at 22:26

## 2020-01-01 RX ADMIN — FAMOTIDINE 20 MILLIGRAM(S): 10 INJECTION INTRAVENOUS at 12:29

## 2020-03-03 PROBLEM — Z82.49 FAMILY HISTORY OF HYPERTENSION: Status: ACTIVE | Noted: 2020-01-01

## 2020-03-03 NOTE — ASSESSMENT
[FreeTextEntry1] : 74 year old male former heavy smoker, asbestos exposure presents for evaluation RLL mas, COPD\par \par Obtain PET CT \par Surgical evaluation to be arranged pending PET CT results.\par \par Follow up after PET

## 2020-03-03 NOTE — COUNSELING
[FreeTextEntry2] : Patient not currently smoking quit 1996 [None] : None [Good understanding] : Patient has a good understanding of lifestyle changes and steps needed to achieve self management goal

## 2020-03-03 NOTE — REASON FOR VISIT
[Initial] : an initial visit [Spouse] : spouse [Abnormal CXR/ Chest CT] : an abnormal CXR/ chest CT [TextBox_44] : LEX jorgensen

## 2020-03-03 NOTE — PROCEDURE
[FreeTextEntry1] : PFT 3/3/2020 personally reviewed moderate obstructive ventilatory defect without gas exchange abnormality and insignificant bronchodilator response \par \par CT chest 2/19/20 personally reviewed 5.0 x 5.0 x 4.0 cm mass RLL with thickened pleural attacment to large peridiaphragmatic plaque\par \par

## 2020-03-03 NOTE — PHYSICAL EXAM
[No Acute Distress] : no acute distress [Normal Oropharynx] : normal oropharynx [Normal Appearance] : normal appearance [No Neck Mass] : no neck mass [Normal Rate/Rhythm] : normal rate/rhythm [Normal S1, S2] : normal s1, s2 [No Resp Distress] : no resp distress [Clear to Auscultation Bilaterally] : clear to auscultation bilaterally [No Abnormalities] : no abnormalities [Benign] : benign [Normal Gait] : normal gait [No Clubbing] : no clubbing [No Cyanosis] : no cyanosis [No Edema] : no edema [Normal Color/ Pigmentation] : normal color/ pigmentation [Non-Pitting] : non-pitting [No Focal Deficits] : no focal deficits [Oriented x3] : oriented x3 [Normal Affect] : normal affect

## 2020-03-03 NOTE — HISTORY OF PRESENT ILLNESS
[Former] : former [>= 30 pack years] : >= 30 pack years [Never] : never [TextBox_4] : Patient is a 74 year old male former heavy smoker, 2 PPD greater than 40 years presents Hx HTN, DM, OA to [facility] for evaluation abnormal CT chest.  Patient had recent right knee replacement.  he reports CXR was done as part of pre surgical testing.  Patietn then referred for CT chest.  CT chest personally reviewed 1/19/20 reveals irregular 5.0 x 5.0 x 4.0 cm mass RLL, thickened pleural attachment.    He denies symptoms shortness of breath, cough, hemoptysis weight loss or systemic complaints. [TextBox_11] : 2 [TextBox_15] : 1996 [TextBox_13] : 39

## 2020-03-20 PROBLEM — R91.8 RIGHT LOWER LOBE LUNG MASS: Status: ACTIVE | Noted: 2020-01-01

## 2020-03-20 NOTE — REASON FOR VISIT
[Follow-Up] : a follow-up visit [Abnormal CXR/ Chest CT] : an abnormal CXR/ chest CT [Spouse] : spouse [TextBox_44] : RLL mass PET CT follow up

## 2020-03-20 NOTE — COUNSELING
[None] : None [Good understanding] : Patient has a good understanding of lifestyle changes and steps needed to achieve self management goal [FreeTextEntry2] : Patient not currently smoking quit 1996

## 2020-03-20 NOTE — ASSESSMENT
[FreeTextEntry1] : 74 year old male former heavy smoker, asbestos exposure presents for evaluation RLL mas, COPD, abnormal PET CT \par \par Thoracic Surgery evaluation Dr. Murdock March 24 10:15 am\par \par Follow up pending Thoracic Surgery evaluation

## 2020-03-20 NOTE — PHYSICAL EXAM
[No Acute Distress] : no acute distress [Normal Oropharynx] : normal oropharynx [Normal Appearance] : normal appearance [No Neck Mass] : no neck mass [Normal Rate/Rhythm] : normal rate/rhythm [Normal S1, S2] : normal s1, s2 [No Resp Distress] : no resp distress [Clear to Auscultation Bilaterally] : clear to auscultation bilaterally [No Abnormalities] : no abnormalities [Benign] : benign [Normal Gait] : normal gait [No Clubbing] : no clubbing [No Cyanosis] : no cyanosis [No Edema] : no edema [Non-Pitting] : non-pitting [Normal Color/ Pigmentation] : normal color/ pigmentation [No Focal Deficits] : no focal deficits [Oriented x3] : oriented x3 [Normal Affect] : normal affect

## 2020-03-20 NOTE — PROCEDURE
[FreeTextEntry1] : PFT 3/3/2020 personally reviewed moderate obstructive ventilatory defect without gas exchange abnormality and insignificant bronchodilator response \par \par CT chest 2/19/20 personally reviewed 5.0 x 5.0 x 4.0 cm mass RLL with thickened pleural attachment to large peridiaphragmatic plaque\par \par PET CT personally reviewed 3/10/20 FDG avid opacity right lung base measures approximately 5.5 x 4.4 cm extending to prominent pleural calcification right lung base.

## 2020-03-20 NOTE — HISTORY OF PRESENT ILLNESS
[Former] : former [>= 30 pack years] : >= 30 pack years [Never] : never [TextBox_4] : Patient is a 74 year old male former heavy smoker, 2 PPD greater than 40 years presents Hx HTN, DM, OA to [facility] for follow up recent abnormal CT chest and subsequent PET CT.   Patient had recent right knee replacement.   CXR was done as part of pre surgical testing.  Patient then referred for CT chest.  CT chest personally reviewed 1/19/20 reveals irregular 5.0 x 5.0 x 4.0 cm mass RLL, thickened pleural attachment.    Subsequent PET CT FDG avid opacity right lung base extending to prominent pleural calcification.  Patietn is here for follow up PET and plan of care.  [TextBox_11] : 2 [TextBox_13] : 39 [TextBox_15] : 1996

## 2020-03-24 PROBLEM — Z86.79 HISTORY OF PERIPHERAL ARTERIAL DISEASE: Status: RESOLVED | Noted: 2020-01-01 | Resolved: 2020-01-01

## 2020-03-24 PROBLEM — Z87.438 HISTORY OF BENIGN PROSTATIC HYPERPLASIA: Status: RESOLVED | Noted: 2020-01-01 | Resolved: 2020-01-01

## 2020-03-24 PROBLEM — J92.0 CALCIFIED PLEURAL PLAQUE DUE TO ASBESTOS EXPOSURE: Status: ACTIVE | Noted: 2020-01-01

## 2020-03-24 PROBLEM — Z86.39 HISTORY OF HYPERLIPIDEMIA: Status: RESOLVED | Noted: 2020-01-01 | Resolved: 2020-01-01

## 2020-03-24 PROBLEM — R59.0 MEDIASTINAL LYMPHADENOPATHY: Status: ACTIVE | Noted: 2020-01-01

## 2020-03-24 PROBLEM — Z87.09 HISTORY OF CHRONIC OBSTRUCTIVE LUNG DISEASE: Status: RESOLVED | Noted: 2020-01-01 | Resolved: 2020-01-01

## 2020-03-24 PROBLEM — Z87.891 FORMER SMOKER: Status: ACTIVE | Noted: 2017-05-23

## 2020-03-24 PROBLEM — Z86.79 HISTORY OF CORONARY ARTERY DISEASE: Status: RESOLVED | Noted: 2020-01-01 | Resolved: 2020-01-01

## 2020-03-24 PROBLEM — Z86.39 HISTORY OF TYPE 2 DIABETES MELLITUS: Status: RESOLVED | Noted: 2020-01-01 | Resolved: 2020-01-01

## 2020-03-24 PROBLEM — Z87.448 HISTORY OF CHRONIC KIDNEY DISEASE: Status: RESOLVED | Noted: 2020-01-01 | Resolved: 2020-01-01

## 2020-03-24 PROBLEM — Z86.79 HISTORY OF HYPERTENSION: Status: RESOLVED | Noted: 2020-01-01 | Resolved: 2020-01-01

## 2020-03-24 PROBLEM — Z86.79 HISTORY OF LEFT BUNDLE BRANCH BLOCK (LBBB): Status: RESOLVED | Noted: 2020-01-01 | Resolved: 2020-01-01

## 2020-03-24 PROBLEM — Z82.49 FAMILY HISTORY OF CARDIAC DISORDER: Status: ACTIVE | Noted: 2020-01-01

## 2020-03-24 PROBLEM — R91.8 LUNG NODULES: Status: ACTIVE | Noted: 2020-01-01

## 2020-03-24 PROBLEM — M19.90 OSTEOARTHRITIS: Status: RESOLVED | Noted: 2020-01-01 | Resolved: 2020-01-01

## 2020-03-24 NOTE — DATA REVIEWED
[FreeTextEntry1] : CT chest on 02/19/2020:\par - irregular 5.0 x 5.0 x 4.0 cm consolidation/mass RLL. Lesion has thickened pleural attachment posteriorly and anteriorly to large calcified pleural peridiaphragmatic plaque. \par - 11 mm groundglass density within 5 mm possible developing solid element FARZAD posteriorly near fissure (image 121)\par - calcified granuloma LLL\par -scattered calcified pleural plaques\par - pulmonary emphysema, scattered pulmonary cysts and small bulla. Largest bulla LLL 3.7 cm. \par \par PFTs on 03/03/2020: FVC 4.41 (91%), FEV1 2.71 (71%), DLCO 23.3 (92%). \par \par PET/CT on 03/10/2020:\par - 5.5 x 4.4 cm right lung base opacity with SUV 7.0 (2: 143), previously 5.3 x 3.6 cm on 02/19/2020. The opacity extends to prominent pleural calcification at the right lung base. \par - previously seen 5 mm groundglass nodule in the posterior FARZAD (2: 30) is unchanged and not FDG avid. \par - there are a few nonenlarged mediastinal LNs with low-grade FDG uptake similar to mediastinal blood pool. For example, a prevascular LN measuring 9 x 6 mm (2: 113) and a right upper paratracheal LN measuring 1.2 x 1.0 cm (2: 11)\par - mild bilateral hilar uptake (SUV 3.0 on the right and SUV 3.0 on the left), likely corresponding to underlying LNs\par - mild fatty infiltration. \par - mild asymmetric increased FDG uptake in the left peripheral apex of the prostate gland with SUV 4.6.\par - bilateral calcified pleural plaques, for example in the anterior lingula and bilateral lung bases.

## 2020-03-24 NOTE — PHYSICAL EXAM
[General Appearance - Alert] : alert [General Appearance - In No Acute Distress] : in no acute distress [Sclera] : the sclera and conjunctiva were normal [PERRL With Normal Accommodation] : pupils were equal in size, round, and reactive to light [Extraocular Movements] : extraocular movements were intact [Outer Ear] : the ears and nose were normal in appearance [Oropharynx] : the oropharynx was normal [Neck Appearance] : the appearance of the neck was normal [Neck Cervical Mass (___cm)] : no neck mass was observed [Jugular Venous Distention Increased] : there was no jugular-venous distention [Thyroid Diffuse Enlargement] : the thyroid was not enlarged [Thyroid Nodule] : there were no palpable thyroid nodules [Auscultation Breath Sounds / Voice Sounds] : lungs were clear to auscultation bilaterally [Heart Rate And Rhythm] : heart rate was normal and rhythm regular [Heart Sounds] : normal S1 and S2 [Heart Sounds Gallop] : no gallops [Murmurs] : no murmurs [Heart Sounds Pericardial Friction Rub] : no pericardial rub [Examination Of The Chest] : the chest was normal in appearance [Chest Visual Inspection Thoracic Asymmetry] : no chest asymmetry [Diminished Respiratory Excursion] : normal chest expansion [2+] : right 2+ [No Abnormalities] : the abdominal aorta was not enlarged and no bruit was heard [Breast Appearance] : normal in appearance [Breast Palpation Mass] : no palpable masses [Bowel Sounds] : normal bowel sounds [Abdomen Soft] : soft [Abdomen Tenderness] : non-tender [Abdomen Mass (___ Cm)] : no abdominal mass palpated [Cervical Lymph Nodes Enlarged Posterior Bilaterally] : posterior cervical [Cervical Lymph Nodes Enlarged Anterior Bilaterally] : anterior cervical [Supraclavicular Lymph Nodes Enlarged Bilaterally] : supraclavicular [No CVA Tenderness] : no ~M costovertebral angle tenderness [No Spinal Tenderness] : no spinal tenderness [Abnormal Walk] : normal gait [Nail Clubbing] : no clubbing  or cyanosis of the fingernails [Musculoskeletal - Swelling] : no joint swelling seen [Motor Tone] : muscle strength and tone were normal [Skin Color & Pigmentation] : normal skin color and pigmentation [Skin Turgor] : normal skin turgor [] : no rash [Deep Tendon Reflexes (DTR)] : deep tendon reflexes were 2+ and symmetric [Sensation] : the sensory exam was normal to light touch and pinprick [No Focal Deficits] : no focal deficits [Oriented To Time, Place, And Person] : oriented to person, place, and time [Impaired Insight] : insight and judgment were intact [Affect] : the affect was normal [Right Carotid Bruit] : no bruit heard over the right carotid [Left Carotid Bruit] : no bruit heard over the left carotid [Right Femoral Bruit] : no bruit heard over the right femoral artery [Left Femoral Bruit] : no bruit heard over the left femoral artery [FreeTextEntry1] : Deferred

## 2020-03-24 NOTE — REASON FOR VISIT
[Consultation] : a consultation visit [Spouse] : spouse [FreeTextEntry1] : Lung nodules, mediastinal lymphadenopathy

## 2020-03-24 NOTE — HISTORY OF PRESENT ILLNESS
[FreeTextEntry1] : Mr. CABRERA PIERRE, 74 year old male, former smoker, w/ hx of HTN, HLD, LBBB, BPH, DMII (diet control), CAD, CKD, PAD s/p right groin stent on Plavix and ASA, OA, COPD, who had recent right knee arthroscopy, had CXR done as part of pre-surgical testing, then CT chest revealed lung nodules. \par \par CT chest on 02/19/2020:\par - irregular 5.0 x 5.0 x 4.0 cm consolidation/mass RLL. Lesion has thickened pleural attachment posteriorly and anteriorly to large calcified pleural peridiaphragmatic plaque. \par - 11 mm groundglass density within 5 mm possible developing solid element FARZAD posteriorly near fissure (image 121)\par - calcified granuloma LLL\par -scattered calcified pleural plaques\par - pulmonary emphysema, scattered pulmonary cysts and small bulla. Largest bulla LLL 3.7 cm. \par \par PFTs on 03/03/2020: FVC 4.41 (91%), FEV1 2.71 (71%), DLCO 23.3 (92%). \par \par PET/CT on 03/10/2020:\par - 5.5 x 4.4 cm right lung base opacity with SUV 7.0 (2: 143), previously 5.3 x 3.6 cm on 02/19/2020. The opacity extends to prominent pleural calcification at the right lung base. \par - previously seen 5 mm groundglass nodule in the posterior FARZAD (2: 30) is unchanged and not FDG avid. \par - there are a few nonenlarged mediastinal LNs with low-grade FDG uptake similar to mediastinal blood pool. For example, a prevascular LN measuring 9 x 6 mm (2: 113) and a right upper paratracheal LN measuring 1.2 x 1.0 cm (2: 11)\par - mild bilateral hilar uptake (SUV 3.0 on the right and SUV 3.0 on the left), likely corresponding to underlying LNs\par - mild fatty infiltration. \par - mild asymmetric increased FDG uptake in the left peripheral apex of the prostate gland with SUV 4.6.\par - bilateral calcified pleural plaques, for example in the anterior lingula and bilateral lung bases.\par \par Of note, patient had asbestos exposure. Works as construction/Painting. \par \par Patient is here today for CT surgery consultation, referred by Dr. Adri Aleman (Pulm). Patient c/o SOB on exertion, denies cough, chest pain, fever, chills, loss of appetite, weight loss, or hemoptysis.

## 2020-03-24 NOTE — CONSULT LETTER
[Dear  ___] : Dear  [unfilled], [Consult Letter:] : I had the pleasure of evaluating your patient, [unfilled]. [( Thank you for referring [unfilled] for consultation for _____ )] : Thank you for referring [unfilled] for consultation for [unfilled] [Please see my note below.] : Please see my note below. [Consult Closing:] : Thank you very much for allowing me to participate in the care of this patient.  If you have any questions, please do not hesitate to contact me. [Sincerely,] : Sincerely, [FreeTextEntry2] : Dr. Adri Aleman (Pulm/Ref)\par Dr. Thakur (PCP)\par Dr. Louann Montoya (Card) [FreeTextEntry3] : Burke Murdock MD, FACS \par Chief, Division of Thoracic Surgery \par Director, Minimally Invasive Thoracic Surgery \par Department of Cardiovascular and Thoracic Surgery \par [facility] \par , Cardiovascular and Thoracic Surgery\par \par

## 2020-05-18 PROBLEM — Z01.818 PREOP TESTING: Status: ACTIVE | Noted: 2020-01-01

## 2020-05-19 NOTE — ASU PATIENT PROFILE, ADULT - AS SC BRADEN FRICTION
Pt's spouse Cooper Chapin contacted and given Dr Nicole Gaytan message below. (3) no apparent problem

## 2020-05-20 NOTE — PACU DISCHARGE NOTE - COMMENTS
Pt initially hypotensive upon arrival to PACU. Given 250mL crystalloid and 250mL albumin. No requirement for pressor drip. Pt mentating well, AAOx4, talking on cellphone with wife. BP improved and stable. Discussed with primary team (HARLAN Jacinto). Pt initially hypotensive upon arrival to PACU. Given 250mL crystalloid and 250mL albumin. No requirement for pressor drip. Pt mentating well, AAOx4, talking on cellphone with wife. BP improved and stable. Discussed with primary team (HARLAN Jacinto). To go to cardiac monitored floor with pulse ox monitoring

## 2020-05-20 NOTE — CHART NOTE - NSCHARTNOTEFT_GEN_A_CORE
Patient s/p flex bronch, right uniportal vats, right lower lobectomy and MLND.  Patient resting comfortably, IV PCA in place.  Incision with dressing clean and dry.  Chest tube to water seal with minimal drainage, no air leak noted.  Patient tolerating po.    Vital Signs Last 24 Hrs  T(C): 36.8 (20 May 2020 14:35), Max: 36.8 (20 May 2020 14:35)  T(F): 98.2 (20 May 2020 14:35), Max: 98.2 (20 May 2020 14:35)  HR: 72 (20 May 2020 18:45) (61 - 72)  BP: 110/52 (20 May 2020 18:45) (78/43 - 165/66)  BP(mean): 66 (20 May 2020 18:45) (54 - 66)  RR: 21 (20 May 2020 18:45) (13 - 21)  SpO2: 97% (20 May 2020 18:45) (94% - 97%    I&O's Summary    MEDICATIONS  (STANDING):  atorvastatin 20 milliGRAM(s) Oral at bedtime  dextrose 5%. 1000 milliLiter(s) (50 mL/Hr) IV Continuous <Continuous>  dextrose 50% Injectable 12.5 Gram(s) IV Push once  dextrose 50% Injectable 25 Gram(s) IV Push once  dextrose 50% Injectable 25 Gram(s) IV Push once  famotidine    Tablet 20 milliGRAM(s) Oral daily  heparin   Injectable 5000 Unit(s) SubCutaneous every 8 hours  HYDROmorphone PCA (1 mG/mL) 30 milliLiter(s) PCA Continuous PCA Continuous  insulin lispro (HumaLOG) corrective regimen sliding scale   SubCutaneous three times a day before meals  lactated ringers. 1000 milliLiter(s) (30 mL/Hr) IV Continuous <Continuous>  senna 2 Tablet(s) Oral at bedtime  tamsulosin 0.4 milliGRAM(s) Oral at bedtime    A/P: S/P Flex bronch, right uniportal vats, right lower lobectomy, MLND  Continue chest tube to water seal   F/U labs and chest x-ray in am  Chest PT, ambulation and incentive spirometer   Continue IV PCA for pain management

## 2020-05-20 NOTE — H&P ADULT - HISTORY OF PRESENT ILLNESS
73 y/o male, former smoker, w/ PMHx HTN, HLD, LBBB, BPH, DM (diet-controlled), CAD, CKD, PAD s/p right groin stent on plavix and ASA (Plavix last taken 6 days ago, aspirin last night), OA, COPD, with PET scan on 3/10/20 revealing a 5.5x4.4cm right lung base opacity, larger since 2/19/20.  Patient presents to Ambulatory Surgery for scheduled Right VATS, Lung Resection today with Dr. Murdock.

## 2020-05-20 NOTE — BRIEF OPERATIVE NOTE - NSICDXBRIEFPROCEDURE_GEN_ALL_CORE_FT
PROCEDURES:  Right lung resection by VATS 20-May-2020 14:34:10 flexible bronchoscopy, R uniportal VATS, RLL lobectomy, MLND Linda Brown

## 2020-05-20 NOTE — PATIENT PROFILE ADULT - DISASTER - LAST TOBACCO USE (DD-MM-[id number])
Pt BIB police in handcuffs. Pt resistant with search, code green called to conduct search.   20-Jun-1997

## 2020-05-20 NOTE — BRIEF OPERATIVE NOTE - NSICDXBRIEFOPLAUNCH_GEN_ALL_CORE
HPI:   Evon Troncoso is a 63 y o  male with  enlarging lung metastases and new lung metastases from  Colon cancer  Patient has nonproductive cough that is more in the morning and has some exertional dyspnea  No chest pain  No hemoptysis  Patient has stage IV colon cancer with metastatic disease to lungs and lymph nodes and on previous CT scan of the chest disease was in the   Right lower lung  He did not qualify for RFA  His pulmonary functions were not good enough for  right lower lobectomy  He did not go for  Cy[employer]Knife  He   had PET-CT scan recently there is progression of disease in the  lungs  See  report  Original colon cancer diagnosis was made in 2008 and he had resection of ascending colon  Second resection was of transverse colon and that was in March 2011  Patient states that even in 2008 he had stage IV disease with involvement of the nodes and lung  Post surgery he received FOLFOX plus Avastin chemotherapy for 6 months in 2008  Patient received chemotherapy again after second surgery in 2011 and that was FOLFIRI plus Avastin, again for 6 months  In 2012 he had wedge resection of right lung nodule  There was no chemotherapy at that time  He had wedge resection of right lower lung nodule in January 2014 and he states this time he had radiation therapy after wedge resection  He thinks he had a total of 3 wedge resections  Post surgery he had atrial fibrillation/flutter  He had ablation of atrial fibrillation and that was successful and he is in sinus rhythm  He had left lower lobe lobectomy on 4/14/2015  Tumor had tested positive for K-farhad mutation  He was not a candidate for Erbitux or Vectibix  was on Xeloda and Avastin from June 2015 - February 2017 because of a small new lesion in the lung  He had good response to Xeloda plus Avastin  2017 stopped Xeloda plus Avastin due to 6 mm lung nodule   Discussed at that time with IR for RFA and could not be done due to technical reasonsthoracic surg in May 2017 regarding 6 mm right lower lobe nodule, stable since Feb 2017, but enlarged since Aug 2016; due to location wedge resection cannot be performed  Thoracic surgery advised SBRT  Rad onc conslt was made  Patient did not receive radiation  He had progression of disease in the right lower lung, hilar and infrahilar areas as above   Patient had not have any treatment since February 2017  Zabrina Cortez running hematocrit 16 and higher  He was checked for erythrocytosis/polycythemia and tests that did not favor polycythemia vera     Chronic problem the left shoulder rotator cuff  He was on oxycodone but not anymore  Current Outpatient Prescriptions:     albuterol (VENTOLIN HFA) 90 mcg/act inhaler, 2 puffs every 4 hours as needed for shortness of breath, Disp: 1 Inhaler, Rfl: 1    Allergies   Allergen Reactions    Aspirin Other (See Comments)     Nose bleed    Sulfa Antibiotics Other (See Comments)     Dizzy         No history exists  ROS:  08/08/18 Reviewed 13 systems:  Presently no headaches, seizures, dizziness, diplopia, dysphagia, hoarseness, chest pain, palpitations,  hemoptysis, abdominal pain, nausea, vomiting, change in bowel habits, melena, hematuria, fever, chills, bleeding, bone pains, skin rash, weight loss,  tiredness ,  weakness, numbness,  claudication and gait problem  No frequent infections  Not unusually sensitive to heat or cold  No swelling of the ankles  No swollen glands  Patient is anxious   Other symptoms are in HPI        /78 (BP Location: Right arm, Cuff Size: Adult)   Pulse 67   Temp 97 7 °F (36 5 °C) (Tympanic)   Resp 16   Ht 6' 5" (1 956 m)   Wt 103 kg (226 lb 3 2 oz)   SpO2 96%   BMI 26 82 kg/m²     Physical Exam:  Alert, oriented, not in distress, no icterus, no oral thrush, no palpable neck mass,   decreased breath sounds left lung base,  regular heart rate, abdomen  soft and non tender, no palpable abdominal mass, no ascites, no edema of ankles, no calf tenderness, no focal neurological deficit, no skin rash, no palpable lymphadenopathy, good arterial pulses, no clubbing  Patient is anxious  Performance status 1  IMAGING:     IMPRESSION:  1  Enlarging hypermetabolic right lower medial lung and right hilar lesions, compatible with metastases  2   New hypermetabolic left upper lung nodule also compatible with metastasis  3   Multiple left pleural-based hypermetabolic lesions, similar in configuration to prior exam, likely inflammatory related to prior talc pleurodesis  4   No new hypermetabolic metastases in the neck, abdomen, pelvis  5   Tiny left apical hydropneumothorax  Follow-up recommended      The study was marked in [facility] for immediate notification         Workstation performed: [id number]      Imaging     NM PET CT skull base to mid thigh (Order #[id number]) on 8/2/2018 - Imaging Information         LABS:  Results for orders placed or performed during the hospital encounter of 08/02/18   Fingerstick Glucose (POCT)   Result Value Ref Range    POC Glucose 100 65 - 140 mg/dl     Labs, Imaging, & Other studies:   All pertinent labs and imaging studies were personally reviewed    Lab Results   Component Value Date     08/02/2018    K 4 1 08/02/2018     08/02/2018    CO2 27 08/02/2018    ANIONGAP 8 08/02/2018    BUN 12 08/02/2018    CREATININE 0 86 08/02/2018    GLUCOSE 88 08/09/2017    GLUF 79 08/02/2018    CALCIUM 9 2 08/02/2018    AST 22 08/02/2018    ALT 23 08/02/2018    ALKPHOS 78 08/02/2018    PROT 7 3 08/02/2018    BILITOT 1 27 (H) 08/02/2018    EGFR 97 08/02/2018     Lab Results   Component Value Date    WBC 5 88 08/02/2018    HGB 17 0 08/02/2018    HCT 49 8 (H) 08/02/2018    MCV 93 08/02/2018     (L) 08/02/2018   No results found for: SEDRATE    Reviewed and discussed with patient  Assessment and plan:  Tri Eller is a 63 y o  male with  enlarging lung metastases and new lung metastases from  Colon cancer    Patient has nonproductive cough that is more in the morning and has some exertional dyspnea  No chest pain  No hemoptysis  Patient has stage IV colon cancer with metastatic disease to lungs and lymph nodes and on previous CT scan of the chest disease was in the   Right lower lung  He did not qualify for RFA  His pulmonary functions were not good enough for  right lower lobectomy  He did not go for  CyberKnife  He   had PET-CT scan recently there is progression of disease in the  lungs  See  report  Original colon cancer diagnosis was made in 2008 and he had resection of ascending colon  Second resection was of transverse colon and that was in March 2011  Patient states that even in 2008 he had stage IV disease with involvement of the nodes and lung  Post surgery he received FOLFOX plus Avastin chemotherapy for 6 months in 2008  Patient received chemotherapy again after second surgery in 2011 and that was FOLFIRI plus Avastin, again for 6 months  In 2012 he had wedge resection of right lung nodule  There was no chemotherapy at that time  He had wedge resection of right lower lung nodule in January 2014 and he states this time he had radiation therapy after wedge resection  He thinks he had a total of 3 wedge resections  Post surgery he had atrial fibrillation/flutter  He had ablation of atrial fibrillation and that was successful and he is in sinus rhythm  He had left lower lobe lobectomy on 4/14/2015  Tumor had tested positive for K-farhad mutation  He was not a candidate for Erbitux or Vectibix  was on Xeloda and Avastin from June 2015 - February 2017 because of a small new lesion in the lung  He had good response to Xeloda plus Avastin  2017 stopped Xeloda plus Avastin due to 6 mm lung nodule   Discussed at that time with IR for RFA and could not be done due to technical reasonsthoracic surg in May 2017 regarding 6 mm right lower lobe nodule, stable since Feb 2017, but enlarged since Aug 2016; due to location wedge resection cannot be performed  Thoracic surgery advised SBRT  Rad onc conslt was made  Patient did not receive radiation  He had progression of disease in the right lower lung, hilar and infrahilar areas as above   Patient had not have any treatment since February 2017  Sammy Spring running hematocrit 16 and higher  He was checked for erythrocytosis/polycythemia and tests that did not favor polycythemia vera     Chronic problem the left shoulder rotator cuff  He was on oxycodone but not anymore  physical examination and test results are as recorded and discussed  I showed him the report of most recent PET-CT scan and he has copy of that  He has information to take home on [place], [place], and FOLFIRI+Cyramza   Myself and my nurse gave information on these medications verbally and in printed form  He has signed consent for [place]  To check for MSI,/ MMR to see he would qualify for Keytruda  I suggested getting  a biopsy on the lung mass but is not ready for that  We had a long discussion  All discussed in detail  Questions answered  Patient voiced understanding and agreement in the discussion  Counseling / Coordination of Care; 50 min   Greater than 50% of total time was spent with the patient and / or family counseling and / or coordination of care  <--- Click to Launch ICDx for PreOp, PostOp and Procedure

## 2020-05-20 NOTE — ASU PREOP CHECKLIST - COMMENTS
Took medication this morning at 6:30 with small sips of water Took BP medication this morning at 6:30 with small sips of water

## 2020-05-21 NOTE — DISCHARGE NOTE PROVIDER - NSDCACTIVITY_GEN_ALL_CORE
Walking - Indoors allowed/Sex allowed/Showering allowed/Do not make important decisions/Stairs allowed/No heavy lifting/straining/Do not drive or operate machinery/Walking - Outdoors allowed

## 2020-05-21 NOTE — DISCHARGE NOTE PROVIDER - NSDCCPCAREPLAN_GEN_ALL_CORE_FT
PRINCIPAL DISCHARGE DIAGNOSIS  Diagnosis: Lung mass  Assessment and Plan of Treatment: Lung mass PRINCIPAL DISCHARGE DIAGNOSIS  Diagnosis: Lung mass  Assessment and Plan of Treatment: Lung mass      SECONDARY DISCHARGE DIAGNOSES  Diagnosis: Afib  Assessment and Plan of Treatment:

## 2020-05-21 NOTE — PROGRESS NOTE ADULT - SUBJECTIVE AND OBJECTIVE BOX
Day __1_ of Anesthesia Pain Management Service    SUBJECTIVE:    Therapy:	  [ x] IV PCA	   [ ] Epidural           [ ] s/p Spinal Opoid              [ ] Postpartum infusion	  [ ] Patient controlled regional anesthesia (PCRA)    [ ] prn Analgesics    OBJECTIVE:   [ x] No new signs     [ ] Other:    Side Effects:  x ] None			[ ] Other:    Assessment of Catheter Site:		[x ] Intact		[ ] Other:    ASSESSMENT/PLAN  [x ] Continue current therapy    [ ] Therapy changed to:    [ ] IV PCA       [ ] Epidural     [ ] prn Analgesics     Comments:

## 2020-05-21 NOTE — DISCHARGE NOTE PROVIDER - NSDCCPTREATMENT_GEN_ALL_CORE_FT
PRINCIPAL PROCEDURE  Procedure: Right lung resection by VATS  Findings and Treatment: flexible bronchoscopy, R uniportal VATS, RLL lobectomy, MLND

## 2020-05-21 NOTE — PROGRESS NOTE ADULT - SUBJECTIVE AND OBJECTIVE BOX
Subjective: "I feel pretty good but it's hard to move and cough sometimes" Pt not using PCA. C/o pain at CT site. Unable to do IS greater than 500 due to pain. Pt given teaching abt using PCA, pain managment. Denies CP or SOB.     Vital Signs:  Vital Signs Last 24 Hrs  T(C): 37.2 (05-21-20 @ 08:25), Max: 37.2 (05-21-20 @ 04:27)  T(F): 98.9 (05-21-20 @ 08:25), Max: 99 (05-21-20 @ 04:27)  HR: 84 (05-21-20 @ 08:25) (61 - 84)  BP: 150/69 (05-21-20 @ 08:25) (78/43 - 150/69)  RR: 20 (05-21-20 @ 08:25) (13 - 21)  SpO2: 95% (05-21-20 @ 08:25) (93% - 98%) on (O2)    Telemetry/Alarms:  General: WN/WD NAD  Neurology: Awake, nonfocal, WIGGINS x 4  Eyes: Scleras clear, PERRLA/ EOMI, Gross vision intact  ENT:Gross hearing intact, grossly patent pharynx, no stridor  Neck: Neck supple, trachea midline, No JVD,   Respiratory: dec BS bilat LL  CV: RRR, S1S2, no murmurs, rubs or gallops  Abdominal: Soft, NT, ND +BS, no BM. Straight cath x 1 overnight for retention. DTV today.   Extremities: No edema, + peripheral pulses  Skin: No Rashes, Hematoma, Ecchymosis  Lymphatic: No Neck, axilla, groin LAD  Psych: Oriented x 3, normal affect  Incisions: rt. VATS c/d/i  Tubes: Rt. CT 400cc/24hrs on WS, no AL. Serosang fluid  Relevant labs, radiology and Medications reviewed           CXR with some bilat atelectasis. No obvious ptx.              14.1   11.83 )-----------( 224      ( 21 May 2020 05:55 )             43.5     05-21    140  |  104  |  18  ----------------------------<  128<H>  4.1   |  22  |  1.26    Ca    8.8      21 May 2020 05:05      PT/INR - ( 20 May 2020 08:25 )   PT: 11.3 SEC;   INR: 0.98          PTT - ( 20 May 2020 08:25 )  PTT:31.2 SEC  MEDICATIONS  (STANDING):  albuterol/ipratropium for Nebulization 3 milliLiter(s) Nebulizer every 6 hours  atorvastatin 20 milliGRAM(s) Oral at bedtime  dextrose 5%. 1000 milliLiter(s) (50 mL/Hr) IV Continuous <Continuous>  dextrose 50% Injectable 12.5 Gram(s) IV Push once  dextrose 50% Injectable 25 Gram(s) IV Push once  dextrose 50% Injectable 25 Gram(s) IV Push once  famotidine    Tablet 20 milliGRAM(s) Oral daily  heparin   Injectable 5000 Unit(s) SubCutaneous every 8 hours  HYDROmorphone PCA (1 mG/mL) 30 milliLiter(s) PCA Continuous PCA Continuous  insulin lispro (HumaLOG) corrective regimen sliding scale   SubCutaneous three times a day before meals  lactated ringers. 1000 milliLiter(s) (30 mL/Hr) IV Continuous <Continuous>  lactated ringers. 1000 milliLiter(s) (30 mL/Hr) IV Continuous <Continuous>  senna 2 Tablet(s) Oral at bedtime  tamsulosin 0.4 milliGRAM(s) Oral at bedtime    MEDICATIONS  (PRN):  dextrose 40% Gel 15 Gram(s) Oral once PRN Blood Glucose LESS THAN 70 milliGRAM(s)/deciliter  diphenhydrAMINE   Injectable 25 milliGRAM(s) IV Push every 4 hours PRN Pruritus  glucagon  Injectable 1 milliGRAM(s) IntraMuscular once PRN Glucose LESS THAN 70 milligrams/deciliter  HYDROmorphone PCA (1 mG/mL) Rescue Clinician Bolus 0.5 milliGRAM(s) IV Push every 15 minutes PRN for Pain Scale GREATER THAN 6  naloxone Injectable 0.1 milliGRAM(s) IV Push every 3 minutes PRN For ANY of the following changes in patient status:  A. RR LESS THAN 10 breaths per minute, B. Oxygen saturation LESS THAN 90%, C. Sedation score of 6  ondansetron Injectable 4 milliGRAM(s) IV Push every 6 hours PRN Nausea  ondansetron Injectable 4 milliGRAM(s) IV Push once PRN Nausea and/or Vomiting    Pertinent Physical Exam  I&O's Summary    20 May 2020 07:01  -  21 May 2020 07:00  --------------------------------------------------------  IN: 330 mL / OUT: 1035 mL / NET: -705 mL    21 May 2020 07:01  -  21 May 2020 09:27  --------------------------------------------------------  IN: 60 mL / OUT: 110 mL / NET: -50 mL        Assessment  74y Male  w/ PAST MEDICAL & SURGICAL HISTORY:  Enlarged prostate with lower urinary tract symptoms (LUTS)  PVD (peripheral vascular disease)  LBBB (left bundle branch block)  Type 2 diabetes mellitus  CAD (coronary artery disease)  Hyperparathyroidism  Obese  Elevated LFTs: details unknown from patient  CKD (chronic kidney disease)  COPD (chronic obstructive pulmonary disease)  Pre-diabetes  PAD (peripheral artery disease): stent 2017  Asthma  Hyperlipidemia  HTN (hypertension)  H/O arthroscopy of right knee  S/P tonsillectomy  admitted with complaints of Patient is a 74y old  Male who presents with a chief complaint of Ambulatory Surgery (20 May 2020 08:29)  75yo M s/p Rt. VATS rt. LL lobectomy POD #1  PLAN  Neuro: Pain management. Will inc pt use of PCA. IV tylenol added.   Pulm: Encourage coughing, deep breathing and use of incentive spirometry. Wean off supplemental oxygen as able. Daily CXR. Added nebs. Needs improved pulm toilet with pain control.   Cardio: Monitor telemetry/alarms  GI: Tolerating diet. Continue stool softeners.   Renal: monitor urine output, supplement electrolytes as needed Will monitor for void. Flomax restarted.   Vasc: Heparin SC/SCDs for DVT prophylaxis  Heme: Stable H/H. .   ID: Off antibiotics. Stable.  Therapy: OOB/ambulate  Tubes: Monitor Chest tube output, will keep on waterseal.   Disposition: Aim to D/C to home once CT removed.   Discussed with Cardiothoracic Team at AM rounds.

## 2020-05-21 NOTE — DISCHARGE NOTE PROVIDER - NSDCMRMEDTOKEN_GEN_ALL_CORE_FT
amLODIPine 10 mg oral tablet: 1 tab(s) orally once a day  Aspir 81 oral delayed release tablet: 1 tab(s) orally once a day  atorvastatin 20 mg oral tablet: 1 tab(s) orally once a day  hydrALAZINE 50 mg oral tablet: 1 tab(s) orally 2 times a day  Lasix 40 mg oral tablet: 1 tab(s) orally once a day  losartan 50 mg oral tablet: 1 tab(s) orally once a day  metoprolol succinate 50 mg oral tablet, extended release: 1 tab(s) orally once a day  Pepcid 40 mg oral tablet: 40 milligram(s) orally once a day, As Needed  Plavix 75 mg oral tablet: 1 tab(s) orally once a day  tamsulosin 0.4 mg oral capsule: 1 cap(s) orally once a day amLODIPine 10 mg oral tablet: 1 tab(s) orally once a day  apixaban 5 mg oral tablet: 1 tab(s) orally every 12 hours  Aspir 81 oral delayed release tablet: 1 tab(s) orally once a day  atorvastatin 20 mg oral tablet: 1 tab(s) orally once a day  Colace 100 mg oral capsule: 1 cap(s) orally 2 times a day  Lasix 40 mg oral tablet: 1 tab(s) orally once a day  losartan 50 mg oral tablet: 1 tab(s) orally once a day  metoprolol succinate 50 mg oral tablet, extended release: 1 tab(s) orally once a day  oxyCODONE 5 mg oral tablet: 2 tab(s) orally every 6 hours, As Needed for severe pain. Can take 1 tab for moderate pain. MDD:8  Pepcid 40 mg oral tablet: 40 milligram(s) orally once a day, As Needed  senna oral tablet: 2 tab(s) orally once a day (at bedtime)  tamsulosin 0.4 mg oral capsule: 1 cap(s) orally once a day  Tylenol 325 mg oral tablet: 2 tab(s) orally every 6 hours, As Needed

## 2020-05-21 NOTE — DISCHARGE NOTE PROVIDER - NSDCFUADDAPPT_GEN_ALL_CORE_FT
See Dr Murdock in 2 weeks- call for an appointment and bring a new chest X-ray when you come. See Dr Murdock in 1- 2 weeks- call for an appointment [phone] and bring a new chest X-ray when you come.  You had atrial fibrillation while you were in the hospital and you were started on new medication, including a blood thinner. Follow up with your regular Cardiologist in 1 week to review your meds and check your heart rhythm.

## 2020-05-21 NOTE — DISCHARGE NOTE PROVIDER - NSDCFUADDINST_GEN_ALL_CORE_FT
The suture will come out in the office.  Keep the dressing on the chest tube site for two days and then you can remove it so that you can shower.  Wash with soap and water and let the wound dry.  If you notice pus or increased redness or fevers, call Dr Murdock or go to the ER. The suture will come out in the office.  Keep the dressing on the chest tube site until tomorrow and then you can remove it so that you can shower.  Wash with soap and water and let the wound dry.  If you notice pus or increased redness or fevers, call Dr Murdock or go to the ER.  Walk frequently. You may climb stairs. Continue to use incentive spirometer.

## 2020-05-21 NOTE — DISCHARGE NOTE PROVIDER - CARE PROVIDER_API CALL
Burke Murdock  SURGERY  [address] [place] [address]  Phone: [phone]  Fax: [phone]  Established Patient  Follow Up Time:

## 2020-05-21 NOTE — PROGRESS NOTE ADULT - SUBJECTIVE AND OBJECTIVE BOX
Anesthesia Pain Management Service    SUBJECTIVE: Patient is doing well with IV PCA and no significant problems reported. Patient states he was not using the IV PCA last night. Patient states he started using the IV PCA this morning for his pain. Patient states his pain is more when he takes deep breaths. Patient states IV PCA is helping with his pain.    Pain Scale Score	At rest: _4/10__ 	With Activity: ___ 	[X ] Refer to charted pain scores    THERAPY:    [ ] IV PCA Morphine		[ ] 5 mg/mL	[ ] 1 mg/mL  [X ] IV PCA Hydromorphone	[ ] 5 mg/mL	[X ] 1 mg/mL  [ ] IV PCA Fentanyl		[ ] 50 micrograms/mL    Demand dose __0.2_ lockout __6_ (minutes) Continuous Rate _0__ Total: 1.4___  mg used (in past 24 hours)      MEDICATIONS  (STANDING):  albuterol/ipratropium for Nebulization 3 milliLiter(s) Nebulizer every 6 hours  atorvastatin 20 milliGRAM(s) Oral at bedtime  dextrose 5%. 1000 milliLiter(s) (50 mL/Hr) IV Continuous <Continuous>  dextrose 50% Injectable 12.5 Gram(s) IV Push once  dextrose 50% Injectable 25 Gram(s) IV Push once  dextrose 50% Injectable 25 Gram(s) IV Push once  famotidine    Tablet 20 milliGRAM(s) Oral daily  heparin   Injectable 5000 Unit(s) SubCutaneous every 8 hours  HYDROmorphone PCA (1 mG/mL) 30 milliLiter(s) PCA Continuous PCA Continuous  insulin lispro (HumaLOG) corrective regimen sliding scale   SubCutaneous three times a day before meals  lactated ringers. 1000 milliLiter(s) (30 mL/Hr) IV Continuous <Continuous>  lactated ringers. 1000 milliLiter(s) (30 mL/Hr) IV Continuous <Continuous>  senna 2 Tablet(s) Oral at bedtime  tamsulosin 0.4 milliGRAM(s) Oral at bedtime    MEDICATIONS  (PRN):  dextrose 40% Gel 15 Gram(s) Oral once PRN Blood Glucose LESS THAN 70 milliGRAM(s)/deciliter  diphenhydrAMINE   Injectable 25 milliGRAM(s) IV Push every 4 hours PRN Pruritus  glucagon  Injectable 1 milliGRAM(s) IntraMuscular once PRN Glucose LESS THAN 70 milligrams/deciliter  HYDROmorphone PCA (1 mG/mL) Rescue Clinician Bolus 0.5 milliGRAM(s) IV Push every 15 minutes PRN for Pain Scale GREATER THAN 6  naloxone Injectable 0.1 milliGRAM(s) IV Push every 3 minutes PRN For ANY of the following changes in patient status:  A. RR LESS THAN 10 breaths per minute, B. Oxygen saturation LESS THAN 90%, C. Sedation score of 6  ondansetron Injectable 4 milliGRAM(s) IV Push every 6 hours PRN Nausea  ondansetron Injectable 4 milliGRAM(s) IV Push once PRN Nausea and/or Vomiting      OBJECTIVE: Patient sitting up in bed. Chest tube x1 in place.     Sedation Score:	[ X] Alert	[ ] Drowsy 	[ ] Arousable	[ ] Asleep	[ ] Unresponsive    Side Effects:	[X ] None	[ ] Nausea	[ ] Vomiting	[ ] Pruritus  		[ ] Other:    Vital Signs Last 24 Hrs  T(C): 37.2 (21 May 2020 08:25), Max: 37.2 (21 May 2020 04:27)  T(F): 98.9 (21 May 2020 08:25), Max: 99 (21 May 2020 04:27)  HR: 84 (21 May 2020 08:25) (61 - 84)  BP: 150/69 (21 May 2020 08:25) (78/43 - 150/69)  BP(mean): 66 (20 May 2020 18:45) (54 - 66)  RR: 20 (21 May 2020 08:25) (13 - 21)  SpO2: 95% (21 May 2020 08:25) (93% - 98%)    ASSESSMENT/ PLAN    Therapy to  be:	[ X] Continue   [ ] Discontinued   [ ] Change to prn Analgesics    Documentation and Verification of current medications:   [X] Done	[ ] Not done, not elligible    Comments: Continue IV PCA. Recommend non-opioid adjuvant analgesics to be used when possible and when allowed by primary surgical team. Educated patient on the use of IV PCA.    Progress Note written now but Patient was seen earlier. Anesthesia Pain Management Service    SUBJECTIVE: Patient is doing well with IV PCA and no significant problems reported. Patient states he was not using the IV PCA last night. Patient states he started using the IV PCA this morning for his pain. Patient states his pain is more when he takes deep breaths. Patient states IV PCA is helping with his pain.    Pain Scale Score	At rest: _4/10__ 	With Activity: ___ 	[X ] Refer to charted pain scores    THERAPY:    [ ] IV PCA Morphine		[ ] 5 mg/mL	[ ] 1 mg/mL  [X ] IV PCA Hydromorphone	[ ] 5 mg/mL	[X ] 1 mg/mL  [ ] IV PCA Fentanyl		[ ] 50 micrograms/mL    Demand dose __0.2_ lockout __6_ (minutes) Continuous Rate _0__ Total: 1.4___  mg used (in past 24 hours)      MEDICATIONS  (STANDING):  albuterol/ipratropium for Nebulization 3 milliLiter(s) Nebulizer every 6 hours  atorvastatin 20 milliGRAM(s) Oral at bedtime  dextrose 5%. 1000 milliLiter(s) (50 mL/Hr) IV Continuous <Continuous>  dextrose 50% Injectable 12.5 Gram(s) IV Push once  dextrose 50% Injectable 25 Gram(s) IV Push once  dextrose 50% Injectable 25 Gram(s) IV Push once  famotidine    Tablet 20 milliGRAM(s) Oral daily  heparin   Injectable 5000 Unit(s) SubCutaneous every 8 hours  HYDROmorphone PCA (1 mG/mL) 30 milliLiter(s) PCA Continuous PCA Continuous  insulin lispro (HumaLOG) corrective regimen sliding scale   SubCutaneous three times a day before meals  lactated ringers. 1000 milliLiter(s) (30 mL/Hr) IV Continuous <Continuous>  lactated ringers. 1000 milliLiter(s) (30 mL/Hr) IV Continuous <Continuous>  senna 2 Tablet(s) Oral at bedtime  tamsulosin 0.4 milliGRAM(s) Oral at bedtime    MEDICATIONS  (PRN):  dextrose 40% Gel 15 Gram(s) Oral once PRN Blood Glucose LESS THAN 70 milliGRAM(s)/deciliter  diphenhydrAMINE   Injectable 25 milliGRAM(s) IV Push every 4 hours PRN Pruritus  glucagon  Injectable 1 milliGRAM(s) IntraMuscular once PRN Glucose LESS THAN 70 milligrams/deciliter  HYDROmorphone PCA (1 mG/mL) Rescue Clinician Bolus 0.5 milliGRAM(s) IV Push every 15 minutes PRN for Pain Scale GREATER THAN 6  naloxone Injectable 0.1 milliGRAM(s) IV Push every 3 minutes PRN For ANY of the following changes in patient status:  A. RR LESS THAN 10 breaths per minute, B. Oxygen saturation LESS THAN 90%, C. Sedation score of 6  ondansetron Injectable 4 milliGRAM(s) IV Push every 6 hours PRN Nausea  ondansetron Injectable 4 milliGRAM(s) IV Push once PRN Nausea and/or Vomiting      OBJECTIVE: Patient sitting up in bed. Chest tube x1 in place.     Sedation Score:	[ X] Alert	[ ] Drowsy 	[ ] Arousable	[ ] Asleep	[ ] Unresponsive    Side Effects:	[X ] None	[ ] Nausea	[ ] Vomiting	[ ] Pruritus  		[ ] Other:    Vital Signs Last 24 Hrs  T(C): 37.2 (21 May 2020 08:25), Max: 37.2 (21 May 2020 04:27)  T(F): 98.9 (21 May 2020 08:25), Max: 99 (21 May 2020 04:27)  HR: 84 (21 May 2020 08:25) (61 - 84)  BP: 150/69 (21 May 2020 08:25) (78/43 - 150/69)  BP(mean): 66 (20 May 2020 18:45) (54 - 66)  RR: 20 (21 May 2020 08:25) (13 - 21)  SpO2: 95% (21 May 2020 08:25) (93% - 98%)    ASSESSMENT/ PLAN    Therapy to  be:	[ X] Continue   [ ] Discontinued   [ ] Change to prn Analgesics    Documentation and Verification of current medications:   [X] Done	[ ] Not done, not elligible    Comments: Continue IV PCA. Recommend non-opioid adjuvant analgesics to be used when possible and when allowed by primary surgical team. Educated patient on the use of IV PCA.  Recommend AM LFTs for possible Tylenol use.  Progress Note written now but Patient was seen earlier.

## 2020-05-21 NOTE — DISCHARGE NOTE PROVIDER - HOSPITAL COURSE
This 74 year old male former smoker with PMH of HTN, HLD, LBBB, BPH, DM (diet-controlled), CAD, CKD, PAD (s/p right groin stent on plavix and ASA), OA, & COPD had a PET scan on 3/10/20 that revealed a 5.5 x 4.4 cm right lung base opacity, larger since 2/19/20.  He then underwent a scheduled Right VATS, RLL Lobectomy, MLND on 5/20/20.  His chest tube was removed when the drainage decreased.  However, his post-op course was complicated by new-onset rate-controlled a-fib. This 74 year old male former smoker with PMH of HTN, HLD, LBBB, BPH, DM (diet-controlled), CAD, CKD, PAD (s/p right groin stent on plavix and ASA), OA, & COPD had a PET scan on 3/10/20 that revealed a 5.5 x 4.4 cm right lung base opacity, larger since 2/19/20.  He then underwent a scheduled Right VATS, RLL Lobectomy, MLND on 5/20/20.  His chest tube was removed when the drainage decreased.  However, his post-op course was complicated by new-onset rate-controlled a-fib.  He was started on Eliquis for that and will follow up with his cardiologist. Pt remains in rate controlled afib. Chest tube removed yesterday.     CXR remains stable. Today pt feels well. Leukocytosis resolved. Pain improved. Pt ambulating with walker in hallway on RA. O2 sat remained >95% w walking. No need for home O2. No PT    needs for home. VATS c/d/i. Cleared for d/c to home today by Dr. Michelle.     Vital Signs Last 24 Hrs    T(C): 36.7 (24 May 2020 05:30), Max: 37.1 (23 May 2020 16:23)    T(F): 98.1 (24 May 2020 05:30), Max: 98.8 (23 May 2020 16:23)    HR: 88 (24 May 2020 05:30) (58 - 100)    BP: 131/87 (24 May 2020 05:30) (131/87 - 142/81)    BP(mean): --    RR: 18 (24 May 2020 05:30) (17 - 18)    SpO2: 95% (24 May 2020 05:30) (95% - 97%)

## 2020-05-22 NOTE — CONSULT NOTE ADULT - ASSESSMENT
74M hx of known afib, HTN, HLD, BPH, DM2, CAD, PAD s/p stent on ASA/Plavix, y/o male, former smoker, w/ PMHx HTN, HLD, LBBB, BPH, DM (diet-controlled), CAD, CKD, PAD s/p RLE PCI (3 years ago) on ASA/Plavix, OA, COPD, here for ambulatory Surgery for scheduled Right VATS.  Post-op course complicated by known atrial fibrillation.    #known atrial fibrillation  -known hx for many years 74M hx of afib, HTN, HLD, BPH, DM2, CAD, PAD s/p stent on ASA/Plavix, y/o male, former smoker, w/ PMHx HTN, HLD, LBBB, BPH, DM (diet-controlled), CAD, CKD, PAD s/p RLE PCI (3 years ago) on ASA/Plavix, OA, COPD, here for ambulatory Surgery for scheduled Right VATS.  Post-op course complicated by known atrial fibrillation.    #atrial fibrillation  -pt with known afib  -HDS, asx, rate controlled  -outpt TTE reviewed, normal LV function, atrial size, and valve function  -c/w home atenolol  -would start Eliquis 5mg BID if cleared by surgery  -c/w ASA 81mg, no need to restart Plavix now that pt will be on full dose AC    Above recs are final.  If there is a change in clinical course requiring further cardiology input, please give us a call.  We will sign off.    Discussed w/ Dr. Gustavo Mccormick MD  Cardiology Fellow - PGY 4  Text or Call: [phone]  For all New Consults and Questions:  [email or web address]   Login: [employer]

## 2020-05-22 NOTE — PROGRESS NOTE ADULT - SUBJECTIVE AND OBJECTIVE BOX
Anesthesia Pain Management Service    SUBJECTIVE: Patient is doing well with IV PCA and no significant problems reported.    Pain Scale Score	At rest: __5_ 	With Activity: ___ 	[X ] Refer to charted pain scores    THERAPY:    [ ] IV PCA Morphine		[ ] 5 mg/mL	[ ] 1 mg/mL  [X ] IV PCA Hydromorphone	[ ] 5 mg/mL	[X ] 1 mg/mL  [ ] IV PCA Fentanyl		[ ] 50 micrograms/mL    Demand dose __0.2_ lockout __6_ (minutes) Continuous Rate _0__ Total: _6.75__   mg used (in past 24 hrs)      MEDICATIONS  (STANDING):  acetaminophen   Tablet .. 650 milliGRAM(s) Oral every 6 hours  albuterol/ipratropium for Nebulization 3 milliLiter(s) Nebulizer every 6 hours  atorvastatin 20 milliGRAM(s) Oral at bedtime  dextrose 5%. 1000 milliLiter(s) (50 mL/Hr) IV Continuous <Continuous>  dextrose 50% Injectable 12.5 Gram(s) IV Push once  dextrose 50% Injectable 25 Gram(s) IV Push once  dextrose 50% Injectable 25 Gram(s) IV Push once  famotidine    Tablet 20 milliGRAM(s) Oral daily  heparin   Injectable 5000 Unit(s) SubCutaneous every 8 hours  insulin lispro (HumaLOG) corrective regimen sliding scale   SubCutaneous three times a day before meals  lactated ringers. 1000 milliLiter(s) (30 mL/Hr) IV Continuous <Continuous>  lactated ringers. 1000 milliLiter(s) (30 mL/Hr) IV Continuous <Continuous>  lidocaine   Patch 1 Patch Transdermal every 24 hours  metoprolol succinate ER 50 milliGRAM(s) Oral daily  senna 2 Tablet(s) Oral at bedtime  tamsulosin 0.4 milliGRAM(s) Oral at bedtime    MEDICATIONS  (PRN):  dextrose 40% Gel 15 Gram(s) Oral once PRN Blood Glucose LESS THAN 70 milliGRAM(s)/deciliter  glucagon  Injectable 1 milliGRAM(s) IntraMuscular once PRN Glucose LESS THAN 70 milligrams/deciliter  ondansetron Injectable 4 milliGRAM(s) IV Push once PRN Nausea and/or Vomiting  oxyCODONE    IR 5 milliGRAM(s) Oral every 3 hours PRN Moderate Pain (4 - 6)  oxyCODONE    IR 10 milliGRAM(s) Oral every 3 hours PRN Severe Pain (7 - 10)      OBJECTIVE: laying in bed     Sedation Score:	[ X] Alert	[ ] Drowsy 	[ ] Arousable	[ ] Asleep	[ ] Unresponsive    Side Effects:	[X ] None	[ ] Nausea	[ ] Vomiting	[ ] Pruritus  		[ ] Other:    Vital Signs Last 24 Hrs  T(C): 36.8 (22 May 2020 09:00), Max: 36.8 (22 May 2020 05:33)  T(F): 98.2 (22 May 2020 09:00), Max: 98.3 (22 May 2020 05:33)  HR: 91 (22 May 2020 09:00) (80 - 105)  BP: 117/59 (22 May 2020 09:00) (117/59 - 145/62)  BP(mean): --  RR: 17 (22 May 2020 09:00) (17 - 18)  SpO2: 94% (22 May 2020 09:00) (92% - 98%)    ASSESSMENT/ PLAN    Therapy to  be:	[ ] Continue   [ X] Discontinued   [X ] Change to prn Analgesics    Documentation and Verification of current medications:   [X] Done	[ ] Not done, not elligible    Comments: PRN Oral/IV opioids and/or Adjuvant medication to be ordered at this point.

## 2020-05-22 NOTE — PROGRESS NOTE ADULT - SUBJECTIVE AND OBJECTIVE BOX
Subjective: no acute complaints, pain controlled, ambulating in room, admits BM since OR  rate controlled afib since 5:30pm yesterday    Vital Signs:  Vital Signs Last 24 Hrs  T(C): 36.8 (05-22-20 @ 09:00), Max: 36.8 (05-22-20 @ 05:33)  T(F): 98.2 (05-22-20 @ 09:00), Max: 98.3 (05-22-20 @ 05:33)  HR: 91 (05-22-20 @ 09:00) (80 - 105)  BP: 117/59 (05-22-20 @ 09:00) (117/59 - 145/62)  RR: 17 (05-22-20 @ 09:00) (17 - 18)  SpO2: 94% (05-22-20 @ 09:00) (92% - 98%) on (O2)    Telemetry/Alarms:  General: WN/WD NAD  Neurology: Awake, nonfocal, WIGGINS x 4  Eyes: Scleras clear, PERRLA/ EOMI, Gross vision intact  ENT:Gross hearing intact, grossly patent pharynx, no stridor  Neck: Neck supple, trachea midline, No JVD,   Respiratory: CTA B/L, No wheezing, rales, rhonchi  CV: RRR, S1S2, no murmurs, rubs or gallops  Abdominal: Soft, NT, ND +BS,   Extremities: No edema, + peripheral pulses  Skin: No Rashes, Hematoma, Ecchymosis  Lymphatic: No Neck, axilla, groin LAD  Psych: Oriented x 3, normal affect  Incisions: c,d,i  Tubes: chest tube drained 620cc/24h on waterseal no air leak  Relevant labs, radiology and Medications reviewed                        14.5   12.19 )-----------( 216      ( 22 May 2020 06:00 )             45.9     05-22    137  |  102  |  19  ----------------------------<  143<H>  4.1   |  22  |  1.33<H>    Ca    8.8      22 May 2020 06:00  Mg     2.3     05-22        MEDICATIONS  (STANDING):  albuterol/ipratropium for Nebulization 3 milliLiter(s) Nebulizer every 6 hours  atorvastatin 20 milliGRAM(s) Oral at bedtime  dextrose 5%. 1000 milliLiter(s) (50 mL/Hr) IV Continuous <Continuous>  dextrose 50% Injectable 12.5 Gram(s) IV Push once  dextrose 50% Injectable 25 Gram(s) IV Push once  dextrose 50% Injectable 25 Gram(s) IV Push once  famotidine    Tablet 20 milliGRAM(s) Oral daily  heparin   Injectable 5000 Unit(s) SubCutaneous every 8 hours  HYDROmorphone PCA (1 mG/mL) 30 milliLiter(s) PCA Continuous PCA Continuous  insulin lispro (HumaLOG) corrective regimen sliding scale   SubCutaneous three times a day before meals  lactated ringers. 1000 milliLiter(s) (30 mL/Hr) IV Continuous <Continuous>  lactated ringers. 1000 milliLiter(s) (30 mL/Hr) IV Continuous <Continuous>  lidocaine   Patch 1 Patch Transdermal every 24 hours  metoprolol succinate ER 50 milliGRAM(s) Oral daily  senna 2 Tablet(s) Oral at bedtime  tamsulosin 0.4 milliGRAM(s) Oral at bedtime    MEDICATIONS  (PRN):  dextrose 40% Gel 15 Gram(s) Oral once PRN Blood Glucose LESS THAN 70 milliGRAM(s)/deciliter  diphenhydrAMINE   Injectable 25 milliGRAM(s) IV Push every 4 hours PRN Pruritus  glucagon  Injectable 1 milliGRAM(s) IntraMuscular once PRN Glucose LESS THAN 70 milligrams/deciliter  HYDROmorphone PCA (1 mG/mL) Rescue Clinician Bolus 0.5 milliGRAM(s) IV Push every 15 minutes PRN for Pain Scale GREATER THAN 6  naloxone Injectable 0.1 milliGRAM(s) IV Push every 3 minutes PRN For ANY of the following changes in patient status:  A. RR LESS THAN 10 breaths per minute, B. Oxygen saturation LESS THAN 90%, C. Sedation score of 6  ondansetron Injectable 4 milliGRAM(s) IV Push every 6 hours PRN Nausea  ondansetron Injectable 4 milliGRAM(s) IV Push once PRN Nausea and/or Vomiting    Pertinent Physical Exam  I&O's Summary    21 May 2020 07:01  -  22 May 2020 07:00  --------------------------------------------------------  IN: 690 mL / OUT: 1320 mL / NET: -630 mL        Assessment  74y Male  w/ PAST MEDICAL & SURGICAL HISTORY:  Enlarged prostate with lower urinary tract symptoms (LUTS)  PVD (peripheral vascular disease)  LBBB (left bundle branch block)  Type 2 diabetes mellitus  CAD (coronary artery disease)  Hyperparathyroidism  Obese  Elevated LFTs: details unknown from patient  CKD (chronic kidney disease)  COPD (chronic obstructive pulmonary disease)  Pre-diabetes  PAD (peripheral artery disease): stent 2017  Asthma  Hyperlipidemia  HTN (hypertension)  H/O arthroscopy of right knee  S/P tonsillectomy  admitted with complaints of Patient is a 74y old  Male who presents with a chief complaint of Ambulatory Surgery (20 May 2020 08:29)  73yo M s/p Rt. VATS rt. LL lobectomy POD #1  PLAN  Neuro: Pain management. Will inc pt use of PCA. IV tylenol added.   Pulm: Encourage coughing, deep breathing and use of incentive spirometry. Wean off supplemental oxygen as able. Daily CXR. Added nebs. Needs improved pulm toilet with pain control.   Cardio: Monitor telemetry/alarms  GI: Tolerating diet. Continue stool softeners.   Renal: monitor urine output, supplement electrolytes as needed Will monitor for void. Flomax restarted.   Vasc: Heparin SC/SCDs for DVT prophylaxis  Heme: Stable H/H. .   ID: Off antibiotics. Stable.  Therapy: OOB/ambulate  Tubes: Monitor Chest tube output, will keep on waterseal.   Disposition: Aim to D/C to home once CT removed.   Discussed with Cardiothoracic Team at AM rounds. Subjective: no acute complaints, pain controlled, ambulating in room, admits BM since OR  rate controlled afib since 5:30pm yesterday    Vital Signs:  Vital Signs Last 24 Hrs  T(C): 36.8 (05-22-20 @ 09:00), Max: 36.8 (05-22-20 @ 05:33)  T(F): 98.2 (05-22-20 @ 09:00), Max: 98.3 (05-22-20 @ 05:33)  HR: 91 (05-22-20 @ 09:00) (80 - 105)  BP: 117/59 (05-22-20 @ 09:00) (117/59 - 145/62)  RR: 17 (05-22-20 @ 09:00) (17 - 18)  SpO2: 94% (05-22-20 @ 09:00) (92% - 98%) on (O2)    Telemetry/Alarms:  General: WN/WD NAD  Neurology: Awake, nonfocal, WIGGINS x 4  Eyes: Scleras clear, PERRLA/ EOMI, Gross vision intact  ENT:Gross hearing intact, grossly patent pharynx, no stridor  Neck: Neck supple, trachea midline, No JVD,   Respiratory: CTA B/L, No wheezing, rales, rhonchi  CV: RRR, S1S2, no murmurs, rubs or gallops  Abdominal: Soft, NT, ND +BS,   Extremities: No edema, + peripheral pulses  Skin: No Rashes, Hematoma, Ecchymosis  Lymphatic: No Neck, axilla, groin LAD  Psych: Oriented x 3, normal affect  Incisions: c,d,i  Tubes: chest tube drained 620cc/24h on waterseal no air leak  Relevant labs, radiology and Medications reviewed                        14.5   12.19 )-----------( 216      ( 22 May 2020 06:00 )             45.9     05-22    137  |  102  |  19  ----------------------------<  143<H>  4.1   |  22  |  1.33<H>    Ca    8.8      22 May 2020 06:00  Mg     2.3     05-22        MEDICATIONS  (STANDING):  albuterol/ipratropium for Nebulization 3 milliLiter(s) Nebulizer every 6 hours  atorvastatin 20 milliGRAM(s) Oral at bedtime  dextrose 5%. 1000 milliLiter(s) (50 mL/Hr) IV Continuous <Continuous>  dextrose 50% Injectable 12.5 Gram(s) IV Push once  dextrose 50% Injectable 25 Gram(s) IV Push once  dextrose 50% Injectable 25 Gram(s) IV Push once  famotidine    Tablet 20 milliGRAM(s) Oral daily  heparin   Injectable 5000 Unit(s) SubCutaneous every 8 hours  HYDROmorphone PCA (1 mG/mL) 30 milliLiter(s) PCA Continuous PCA Continuous  insulin lispro (HumaLOG) corrective regimen sliding scale   SubCutaneous three times a day before meals  lactated ringers. 1000 milliLiter(s) (30 mL/Hr) IV Continuous <Continuous>  lactated ringers. 1000 milliLiter(s) (30 mL/Hr) IV Continuous <Continuous>  lidocaine   Patch 1 Patch Transdermal every 24 hours  metoprolol succinate ER 50 milliGRAM(s) Oral daily  senna 2 Tablet(s) Oral at bedtime  tamsulosin 0.4 milliGRAM(s) Oral at bedtime    MEDICATIONS  (PRN):  dextrose 40% Gel 15 Gram(s) Oral once PRN Blood Glucose LESS THAN 70 milliGRAM(s)/deciliter  diphenhydrAMINE   Injectable 25 milliGRAM(s) IV Push every 4 hours PRN Pruritus  glucagon  Injectable 1 milliGRAM(s) IntraMuscular once PRN Glucose LESS THAN 70 milligrams/deciliter  HYDROmorphone PCA (1 mG/mL) Rescue Clinician Bolus 0.5 milliGRAM(s) IV Push every 15 minutes PRN for Pain Scale GREATER THAN 6  naloxone Injectable 0.1 milliGRAM(s) IV Push every 3 minutes PRN For ANY of the following changes in patient status:  A. RR LESS THAN 10 breaths per minute, B. Oxygen saturation LESS THAN 90%, C. Sedation score of 6  ondansetron Injectable 4 milliGRAM(s) IV Push every 6 hours PRN Nausea  ondansetron Injectable 4 milliGRAM(s) IV Push once PRN Nausea and/or Vomiting    Pertinent Physical Exam  I&O's Summary    21 May 2020 07:01  -  22 May 2020 07:00  --------------------------------------------------------  IN: 690 mL / OUT: 1320 mL / NET: -630 mL        Assessment  74y Male  w/ PAST MEDICAL & SURGICAL HISTORY:  Enlarged prostate with lower urinary tract symptoms (LUTS)  PVD (peripheral vascular disease)  LBBB (left bundle branch block)  Type 2 diabetes mellitus  CAD (coronary artery disease)  Hyperparathyroidism  Obese  Elevated LFTs: details unknown from patient  CKD (chronic kidney disease)  COPD (chronic obstructive pulmonary disease)  Pre-diabetes  PAD (peripheral artery disease): stent 2017  Asthma  Hyperlipidemia  HTN (hypertension)  H/O arthroscopy of right knee  S/P tonsillectomy  admitted with complaints of Patient is a 74y old  Male who presents with a chief complaint of Ambulatory Surgery (20 May 2020 08:29)  73yo M s/p Rt. VATS rt. LL lobectomy POD #1  PLAN  Neuro: Pain management. Will inc pt use of PCA. IV tylenol added.   Pulm: Encourage coughing, deep breathing and use of incentive spirometry. Wean off supplemental oxygen as able. Daily CXR. Added nebs. Needs improved pulm toilet with pain control.   Cardio: Monitor telemetry/alarms; cardiology consult for postop atrial fibrillation  GI: Tolerating diet. Continue stool softeners.   Renal: monitor urine output, supplement electrolytes as needed Will monitor for void. Flomax restarted.   Vasc: Heparin SC/SCDs for DVT prophylaxis  Heme: Stable H/H. .   ID: Off antibiotics. Stable.  Therapy: OOB/ambulate  Tubes: Monitor Chest tube output, will keep on waterseal.   Disposition: Aim to D/C to home once CT removed.   Discussed with Cardiothoracic Team at AM rounds.

## 2020-05-22 NOTE — PROGRESS NOTE ADULT - SUBJECTIVE AND OBJECTIVE BOX
Anesthesia Pain Management Service    SUBJECTIVE: Pt doing well with IV PCA without problems reported.    Therapy:	  [ X] IV PCA	   [ ] Epidural           [ ] s/p Spinal Opoid              [ ] Postpartum infusion	  [ ] Patient controlled regional anesthesia (PCRA)    [ ] prn Analgesics    Allergies    No Known Allergies    Intolerances      MEDICATIONS  (STANDING):  albuterol/ipratropium for Nebulization 3 milliLiter(s) Nebulizer every 6 hours  atorvastatin 20 milliGRAM(s) Oral at bedtime  dextrose 5%. 1000 milliLiter(s) (50 mL/Hr) IV Continuous <Continuous>  dextrose 50% Injectable 12.5 Gram(s) IV Push once  dextrose 50% Injectable 25 Gram(s) IV Push once  dextrose 50% Injectable 25 Gram(s) IV Push once  famotidine    Tablet 20 milliGRAM(s) Oral daily  heparin   Injectable 5000 Unit(s) SubCutaneous every 8 hours  HYDROmorphone PCA (1 mG/mL) 30 milliLiter(s) PCA Continuous PCA Continuous  insulin lispro (HumaLOG) corrective regimen sliding scale   SubCutaneous three times a day before meals  lactated ringers. 1000 milliLiter(s) (30 mL/Hr) IV Continuous <Continuous>  lactated ringers. 1000 milliLiter(s) (30 mL/Hr) IV Continuous <Continuous>  lidocaine   Patch 1 Patch Transdermal every 24 hours  metoprolol succinate ER 50 milliGRAM(s) Oral daily  senna 2 Tablet(s) Oral at bedtime  tamsulosin 0.4 milliGRAM(s) Oral at bedtime    MEDICATIONS  (PRN):  dextrose 40% Gel 15 Gram(s) Oral once PRN Blood Glucose LESS THAN 70 milliGRAM(s)/deciliter  diphenhydrAMINE   Injectable 25 milliGRAM(s) IV Push every 4 hours PRN Pruritus  glucagon  Injectable 1 milliGRAM(s) IntraMuscular once PRN Glucose LESS THAN 70 milligrams/deciliter  HYDROmorphone PCA (1 mG/mL) Rescue Clinician Bolus 0.5 milliGRAM(s) IV Push every 15 minutes PRN for Pain Scale GREATER THAN 6  naloxone Injectable 0.1 milliGRAM(s) IV Push every 3 minutes PRN For ANY of the following changes in patient status:  A. RR LESS THAN 10 breaths per minute, B. Oxygen saturation LESS THAN 90%, C. Sedation score of 6  ondansetron Injectable 4 milliGRAM(s) IV Push every 6 hours PRN Nausea  ondansetron Injectable 4 milliGRAM(s) IV Push once PRN Nausea and/or Vomiting      OBJECTIVE:   [X] No new signs     [ ] Other:    Side Effects:  [X ] None			[ ] Other:    Assessment of Catheter Site:		[ ] Intact		[ ] Other:    ASSESSMENT/PLAN  [ X] Continue current therapy    [ ] Therapy changed to:    [ ] IV PCA       [ ] Epidural     [ ] prn Analgesics     Comments:

## 2020-05-22 NOTE — CONSULT NOTE ADULT - SUBJECTIVE AND OBJECTIVE BOX
Patient seen and evaluated at bedside    Chief Complaint:  elective surgery    HPI:  75 y/o male, former smoker, w/ PMHx HTN, HLD, LBBB, BPH, DM (diet-controlled), CAD, CKD, PAD s/p right groin stent on plavix and ASA (Plavix last taken 6 days ago, aspirin last night), OA, COPD, with PET scan on 3/10/20 revealing a 5.5x4.4cm right lung base opacity, larger since 20.  Patient presents to Ambulatory Surgery for scheduled Right VATS, Lung Resection today with Dr. Murdock. (20 May 2020 08:29)      PMHx:   Enlarged prostate with lower urinary tract symptoms (LUTS)  PVD (peripheral vascular disease)  LBBB (left bundle branch block)  Type 2 diabetes mellitus  CAD (coronary artery disease)  Hyperparathyroidism  Obese  Elevated LFTs  CKD (chronic kidney disease)  COPD (chronic obstructive pulmonary disease)  Pre-diabetes  PAD (peripheral artery disease)  Asthma  Hyperlipidemia  HTN (hypertension)      PSHx:   H/O arthroscopy of right knee  S/P tonsillectomy  No significant past surgical history      Allergies:  No Known Allergies      Home Meds:    Current Medications:   acetaminophen   Tablet .. 650 milliGRAM(s) Oral every 6 hours  albuterol/ipratropium for Nebulization 3 milliLiter(s) Nebulizer every 6 hours  atorvastatin 20 milliGRAM(s) Oral at bedtime  dextrose 40% Gel 15 Gram(s) Oral once PRN  dextrose 5%. 1000 milliLiter(s) IV Continuous <Continuous>  dextrose 50% Injectable 12.5 Gram(s) IV Push once  dextrose 50% Injectable 25 Gram(s) IV Push once  dextrose 50% Injectable 25 Gram(s) IV Push once  famotidine    Tablet 20 milliGRAM(s) Oral daily  glucagon  Injectable 1 milliGRAM(s) IntraMuscular once PRN  heparin   Injectable 5000 Unit(s) SubCutaneous every 8 hours  insulin lispro (HumaLOG) corrective regimen sliding scale   SubCutaneous three times a day before meals  lactated ringers. 1000 milliLiter(s) IV Continuous <Continuous>  lactated ringers. 1000 milliLiter(s) IV Continuous <Continuous>  lidocaine   Patch 1 Patch Transdermal every 24 hours  metoprolol succinate ER 50 milliGRAM(s) Oral daily  ondansetron Injectable 4 milliGRAM(s) IV Push once PRN  oxyCODONE    IR 5 milliGRAM(s) Oral every 3 hours PRN  oxyCODONE    IR 10 milliGRAM(s) Oral every 3 hours PRN  senna 2 Tablet(s) Oral at bedtime  tamsulosin 0.4 milliGRAM(s) Oral at bedtime      FAMILY HISTORY:  Family history of heart attack: father  63yo MI      Social History:  Smoking History:  Alcohol Use:  Drug Use:    REVIEW OF SYSTEMS:  Constitutional:     [x ] negative [ ] fevers [ ] chills [ ] weight loss [ ] weight gain  HEENT:                  [x ] negative [ ] dry eyes [ ] eye irritation [ ] postnasal drip [ ] nasal congestion  CV:                         [ x] negative  [ ] chest pain [ ] orthopnea [ ] palpitations [ ] murmur  Resp:                     [x ] negative [ ] cough [ ] shortness of breath [ ] dyspnea [ ] wheezing [ ] sputum [ ]hemoptysis  GI:                          [ x] negative [ ] nausea [ ] vomiting [ ] diarrhea [ ] constipation [ ] abd pain [ ] dysphagia   :                        [ x] negative [ ] dysuria [ ] nocturia [ ] hematuria [ ] increased urinary frequency  Musculoskeletal: [x ] negative [ ] back pain [ ] myalgias [ ] arthralgias [ ] fracture  Skin:                       [ x] negative [ ] rash [ ] itch  Neurological:        [ x] negative [ ] headache [ ] dizziness [ ] syncope [ ] weakness [ ] numbness  Psychiatric:           [ x] negative [ ] anxiety [ ] depression  Endocrine:            [ x] negative [ ] diabetes [ ] thyroid problem  Heme/Lymph:      [ x] negative [ ] anemia [ ] bleeding problem  Allergic/Immune: [ x] negative [ ] itchy eyes [ ] nasal discharge [ ] hives [ ] angioedema    [ x] All other systems negative  [ ] Unable to assess ROS due to      Physical Exam:  T(F): 98.2 (-), Max: 98.3 (-)  HR: 79 () (79 - 105)  BP: 117/59 (-) (117/59 - 145/59)  RR: 17 (-)  SpO2: 94% (-)  GENERAL: No acute distress, well-developed  HEAD:  Atraumatic, Normocephalic  ENT: EOMI, PERRLA, conjunctiva and sclera clear, Neck supple, No JVD, moist mucosa  CHEST/LUNG: Clear to auscultation bilaterally; No wheeze, equal breath sounds bilaterally   BACK: No spinal tenderness  HEART: Regular rate and rhythm; No murmurs, rubs, or gallops  ABDOMEN: Soft, Nontender, Nondistended; Bowel sounds present  EXTREMITIES:  No clubbing, cyanosis, or edema  PSYCH: Nl behavior, nl affect  NEUROLOGY: AAOx3, non-focal, cranial nerves intact  SKIN: Normal color, No rashes or lesions  LINES:    Cardiovascular Diagnostic Testing:    ECG: Personally reviewed:    Echo: Personally reviewed:    Stress Testing:    Cath:    Imaging:    CXR: Personally reviewed    Labs: Personally reviewed                        14.5    )-----------( 216      ( 22 May 2020 06:00 )             45.9     05-    137  |  102  |  19  ----------------------------<  143<H>  4.1   |  22  |  1.33<H>    Ca    8.8      22 May 2020 06:00  Mg     2.3      Patient seen and evaluated at bedside    Chief Complaint:  elective surgery    HPI:  74M hx of PMHx afib, HTN, HLD, LBBB, BPH, DM (diet-controlled), CAD, CKD, PAD s/p right groin stent on plavix and ASA (Plavix last taken 6 days ago, aspirin last night), OA, COPD, with PET scan on 3/10/20 revealing a 5.5x4.4cm right lung base opacity, larger since 20.  Patient presents to Ambulatory Surgery for scheduled Right VATS, Lung Resection today with Dr. Murdock. (20 May 2020 08:29)    Post-operatively, pt was found to be in atrial fibrillation.  Pt with known hx of paroxysmal afib.  Asymptomatic, rate controlled.  Pt reports has been on Coumadin in the past but unclear as to why he was stopped.      PMHx:   Enlarged prostate with lower urinary tract symptoms (LUTS)  PVD (peripheral vascular disease)  LBBB (left bundle branch block)  Type 2 diabetes mellitus  CAD (coronary artery disease)  Hyperparathyroidism  Obese  Elevated LFTs  CKD (chronic kidney disease)  COPD (chronic obstructive pulmonary disease)  Pre-diabetes  PAD (peripheral artery disease)  Asthma  Hyperlipidemia  HTN (hypertension)      PSHx:   H/O arthroscopy of right knee  S/P tonsillectomy  No significant past surgical history      Allergies:  No Known Allergies      Home Meds: reviewed    Current Medications:   acetaminophen   Tablet .. 650 milliGRAM(s) Oral every 6 hours  albuterol/ipratropium for Nebulization 3 milliLiter(s) Nebulizer every 6 hours  atorvastatin 20 milliGRAM(s) Oral at bedtime  dextrose 40% Gel 15 Gram(s) Oral once PRN  dextrose 5%. 1000 milliLiter(s) IV Continuous <Continuous>  dextrose 50% Injectable 12.5 Gram(s) IV Push once  dextrose 50% Injectable 25 Gram(s) IV Push once  dextrose 50% Injectable 25 Gram(s) IV Push once  famotidine    Tablet 20 milliGRAM(s) Oral daily  glucagon  Injectable 1 milliGRAM(s) IntraMuscular once PRN  heparin   Injectable 5000 Unit(s) SubCutaneous every 8 hours  insulin lispro (HumaLOG) corrective regimen sliding scale   SubCutaneous three times a day before meals  lactated ringers. 1000 milliLiter(s) IV Continuous <Continuous>  lactated ringers. 1000 milliLiter(s) IV Continuous <Continuous>  lidocaine   Patch 1 Patch Transdermal every 24 hours  metoprolol succinate ER 50 milliGRAM(s) Oral daily  ondansetron Injectable 4 milliGRAM(s) IV Push once PRN  oxyCODONE    IR 5 milliGRAM(s) Oral every 3 hours PRN  oxyCODONE    IR 10 milliGRAM(s) Oral every 3 hours PRN  senna 2 Tablet(s) Oral at bedtime  tamsulosin 0.4 milliGRAM(s) Oral at bedtime      FAMILY HISTORY:  Family history of heart attack: father  63yo MI      Social History:  Smoking History:  Alcohol Use:  Drug Use:    REVIEW OF SYSTEMS:  Constitutional:     [x ] negative [ ] fevers [ ] chills [ ] weight loss [ ] weight gain  HEENT:                  [x ] negative [ ] dry eyes [ ] eye irritation [ ] postnasal drip [ ] nasal congestion  CV:                         [ x] negative  [ ] chest pain [ ] orthopnea [ ] palpitations [ ] murmur  Resp:                     [x ] negative [ ] cough [ ] shortness of breath [ ] dyspnea [ ] wheezing [ ] sputum [ ]hemoptysis  GI:                          [ x] negative [ ] nausea [ ] vomiting [ ] diarrhea [ ] constipation [ ] abd pain [ ] dysphagia   :                        [ x] negative [ ] dysuria [ ] nocturia [ ] hematuria [ ] increased urinary frequency  Musculoskeletal: [x ] negative [ ] back pain [ ] myalgias [ ] arthralgias [ ] fracture  Skin:                       [ x] negative [ ] rash [ ] itch  Neurological:        [ x] negative [ ] headache [ ] dizziness [ ] syncope [ ] weakness [ ] numbness  Psychiatric:           [ x] negative [ ] anxiety [ ] depression  Endocrine:            [ x] negative [ ] diabetes [ ] thyroid problem  Heme/Lymph:      [ x] negative [ ] anemia [ ] bleeding problem  Allergic/Immune: [ x] negative [ ] itchy eyes [ ] nasal discharge [ ] hives [ ] angioedema    [ x] All other systems negative  [ ] Unable to assess ROS due to      Physical Exam:  T(F): 98.2 (-), Max: 98.3 ()  HR: 79 () (79 - 105)  BP: 117/59 (-) (117/59 - 145/59)  RR: 17 (-)  SpO2: 94% ()  GENERAL: No acute distress, well-developed  HEAD:  Atraumatic, Normocephalic  ENT: EOMI, PERRLA, conjunctiva and sclera clear, Neck supple, No JVD, moist mucosa  CHEST/LUNG: Clear to auscultation bilaterally; No wheeze, equal breath sounds bilaterally   BACK: No spinal tenderness  HEART: Regular rate and rhythm; No murmurs, rubs, or gallops  ABDOMEN: Soft, Nontender, Nondistended; Bowel sounds present  EXTREMITIES:  No clubbing, cyanosis, or edema  PSYCH: Nl behavior, nl affect  NEUROLOGY: AAOx3, non-focal, cranial nerves intact  SKIN: Normal color, No rashes or lesions  LINES:    Cardiovascular Diagnostic Testing:    ECG: Personally reviewed:    Echo: Personally reviewed:    Stress Testing:    Cath:    Imaging:    CXR: Personally reviewed    Labs: Personally reviewed                        14.5    )-----------( 216      ( 22 May 2020 06:00 )             45.9     05-    137  |  102  |  19  ----------------------------<  143<H>  4.1   |  22  |  1.33<H>    Ca    8.8      22 May 2020 06:00  Mg     2.3

## 2020-05-23 NOTE — CHART NOTE - NSCHARTNOTEFT_GEN_A_CORE
Pt O2 saturation 93% on room air at rest. O2 saturation dropped to 84% on room air with ambulation. O2 saturation 96% on 3L via nasal canula while ambulating.

## 2020-05-23 NOTE — PROGRESS NOTE ADULT - SUBJECTIVE AND OBJECTIVE BOX
Subjective: 75 y/o male presents sitting up in chair in NAD at this time. No acute events overnight. Currently on supplemental oxygen but working to wean off. Denies sob at rest, admits to sob with ambulation.    Vital Signs:  Vital Signs Last 24 Hrs  T(C): 36.7 (05-23-20 @ 13:40), Max: 37.2 (05-22-20 @ 15:59)  T(F): 98 (05-23-20 @ 13:40), Max: 98.9 (05-22-20 @ 15:59)  HR: 100 (05-23-20 @ 13:40) (72 - 100)  BP: 134/67 (05-23-20 @ 13:40) (114/75 - 147/83)  RR: 17 (05-23-20 @ 13:40) (16 - 22)  SpO2: 95% (05-23-20 @ 13:40) (84% - 98%) on (O2)    Pertinent Physical Exam:  Telemetry/Alarms: New Afib, now rate controlled.   General: WN/WD NAD  Neurology: Awake, nonfocal, WIGGINS x 4  Eyes: Scleras clear, PERRLA/ EOMI, Gross vision intact  ENT:Gross hearing intact, grossly patent pharynx, no stridor  Neck: Neck supple, trachea midline, No JVD,   Respiratory: CTA B/L, No wheezing, rales, rhonchi  CV: RRR, S1S2, no murmurs, rubs or gallops  Abdominal: Soft, NT, ND +BS,   Extremities: No edema, + peripheral pulses  Skin: No Rashes, Hematoma, Ecchymosis  Lymphatic: No Neck, axilla, groin LAD  Psych: Oriented x 3, normal affect  Incisions: CDI  Tubes: Right CT to WS no AL     Chest Tube: Right Side    Air Leak: Yes[] / No[x]    Drainage: 180cc/24hrs    I&O's Summary    22 May 2020 07:01  -  23 May 2020 07:00  --------------------------------------------------------  IN: 810 mL / OUT: 2480 mL / NET: -1670 mL        Relevant labs, radiology and Medications reviewed                        13.8   13.00 )-----------( 207      ( 23 May 2020 06:30 )             42.7     05-23    137  |  101  |  19  ----------------------------<  137<H>  3.9   |  24  |  1.15    Ca    9.0      23 May 2020 06:30  Phos  2.4     05-23  Mg     2.2     05-23      CXR:   < from: Xray Chest 1 View- PORTABLE-Routine (05.23.20 @ 07:38) >  COMPARISON:  May 22      IMPRESSION:  Increasing interstitial edema post right lung surgery with chest tube unchanged.      < end of copied text >        MEDICATIONS  (STANDING):  acetaminophen   Tablet .. 650 milliGRAM(s) Oral every 6 hours  albuterol/ipratropium for Nebulization 3 milliLiter(s) Nebulizer every 6 hours  apixaban 5 milliGRAM(s) Oral every 12 hours  aspirin enteric coated 81 milliGRAM(s) Oral daily  atorvastatin 20 milliGRAM(s) Oral at bedtime  dextrose 5%. 1000 milliLiter(s) (50 mL/Hr) IV Continuous <Continuous>  dextrose 50% Injectable 12.5 Gram(s) IV Push once  dextrose 50% Injectable 25 Gram(s) IV Push once  dextrose 50% Injectable 25 Gram(s) IV Push once  famotidine    Tablet 20 milliGRAM(s) Oral daily  insulin lispro (HumaLOG) corrective regimen sliding scale   SubCutaneous three times a day before meals  lactated ringers. 1000 milliLiter(s) (30 mL/Hr) IV Continuous <Continuous>  lactated ringers. 1000 milliLiter(s) (30 mL/Hr) IV Continuous <Continuous>  lidocaine   Patch 1 Patch Transdermal every 24 hours  losartan 50 milliGRAM(s) Oral daily  metoprolol succinate ER 50 milliGRAM(s) Oral daily  senna 2 Tablet(s) Oral at bedtime  tamsulosin 0.4 milliGRAM(s) Oral at bedtime    MEDICATIONS  (PRN):  dextrose 40% Gel 15 Gram(s) Oral once PRN Blood Glucose LESS THAN 70 milliGRAM(s)/deciliter  glucagon  Injectable 1 milliGRAM(s) IntraMuscular once PRN Glucose LESS THAN 70 milligrams/deciliter  ondansetron Injectable 4 milliGRAM(s) IV Push once PRN Nausea and/or Vomiting  oxyCODONE    IR 5 milliGRAM(s) Oral every 3 hours PRN Moderate Pain (4 - 6)  oxyCODONE    IR 10 milliGRAM(s) Oral every 3 hours PRN Severe Pain (7 - 10)      Assessment  73yo M  former smoker, pmh of HTN, HLD, LBBB, BPH, DM, CAD, CKD, PAD (s/p right groin stent on plavix and asa), oa, copd with new right lung opacity. Now s/p Rt. VATS rt. LL lobectomy POD #3.   Post op course c/b new onset A-fib now rate controlled on metoprolol. Cardiology consulted, recommended pt to cont asa and begin eliquis instead.     PLAN  Neuro: Pain management with PO meds PRN  Pulm: Encourage coughing, deep breathing and use of incentive spirometry. Wean off supplemental oxygen as able. Chest PT daily. Pt with decreased oxygen saturation with ambulation, O2 being set up for home. Daily CXR.   Cardio: Monitor telemetry/alarms. Cont with metoprolol, asa and eliquis. Rate controlled afib.   GI: Tolerating diet. Continue stool softeners.  Renal: monitor urine output, supplement electrolytes as needed  Vasc: Heparin SC/SCDs for DVT prophylaxis  Heme: Stable H/H.   ID: Pt with slight leukocytosis but afebrile. Cont to monitor Off antibiotics. Stable.  Therapy: OOB/ambulate  Tubes: Chest tube with low output in last 24hrs, removed f/u cxr  Disposition: Continue with Chest PT, use of ISS, eval oxygenation and saturation. Work on getting O2 set up for home.  Discussed with Cardiothoracic Team and Dr. Michelle at bedside rounds.

## 2020-05-24 NOTE — PHYSICAL THERAPY INITIAL EVALUATION ADULT - ADDITIONAL COMMENTS
Patient reports he lives with his wife in a private house, 1 step to enter and 1 flight within, however pt lives on first level. Patient reports he was previously independent in all ADLs and ambulated with a cane. Pt also owns rolling walker.    Patient was left sitting in chair as found, all lines/tubes intact and call morales within reach, RN Rincon C aware.

## 2020-05-24 NOTE — DISCHARGE NOTE NURSING/CASE MANAGEMENT/SOCIAL WORK - NSDCFUADDAPPT_GEN_ALL_CORE_FT
See Dr Murdock in 1- 2 weeks- call for an appointment [phone] and bring a new chest X-ray when you come.  You had atrial fibrillation while you were in the hospital and you were started on new medication, including a blood thinner. Follow up with your regular Cardiologist in 1 week to review your meds and check your heart rhythm.

## 2020-05-24 NOTE — DISCHARGE NOTE NURSING/CASE MANAGEMENT/SOCIAL WORK - PATIENT PORTAL LINK FT
You can access the FollowMyHealth Patient Portal offered by [facility] by registering at the following website: [email or web address]. By joining [email or web address]’s FollowMyHealth portal, you will also be able to view your health information using other applications (apps) compatible with our system.

## 2020-05-24 NOTE — PHYSICAL THERAPY INITIAL EVALUATION ADULT - PERTINENT HX OF CURRENT PROBLEM, REHAB EVAL
Patient is a 74 year old male admitted to [facility] on 5/20 for scheduled Right VATS, Lung Resection with Dr. Murdock. PMH: HTN, HLD, LBBB, BPH, DM (diet-controlled), CAD, CKD, PAD s/p right groin stent on plavix and ASA (Plavix last taken 6 days ago, aspirin last night), OA, COPD, with PET scan on 3/10/20 revealing a 5.5x4.4cm right lung base opacity, larger since 2/19/20.

## 2020-05-24 NOTE — PHYSICAL THERAPY INITIAL EVALUATION ADULT - PATIENT PROFILE REVIEW, REHAB EVAL
PT orders received: ambulate as tolerated. Consult with RN Rincon C, pt may participate in PT evaluation./yes

## 2020-06-08 PROBLEM — C34.90 ADENOCARCINOMA, LUNG: Status: ACTIVE | Noted: 2020-01-01

## 2020-06-15 PROBLEM — C80.1 ADENOCARCINOMA: Status: ACTIVE | Noted: 2020-01-01

## 2020-06-15 PROBLEM — R93.89 ABNORMAL CHEST CT: Status: ACTIVE | Noted: 2020-01-01

## 2020-06-15 PROBLEM — J44.9 COPD, MODERATE: Status: ACTIVE | Noted: 2020-01-01

## 2020-06-15 PROBLEM — R94.2 ABNORMAL PET SCAN, LUNG: Status: ACTIVE | Noted: 2020-01-01

## 2020-06-15 NOTE — PHYSICAL EXAM
[No Acute Distress] : no acute distress [No Neck Mass] : no neck mass [Normal Appearance] : normal appearance [Normal Oropharynx] : normal oropharynx [Normal Rate/Rhythm] : normal rate/rhythm [Normal S1, S2] : normal s1, s2 [No Resp Distress] : no resp distress [No Abnormalities] : no abnormalities [Benign] : benign [Clear to Auscultation Bilaterally] : clear to auscultation bilaterally [No Cyanosis] : no cyanosis [Normal Gait] : normal gait [No Clubbing] : no clubbing [Non-Pitting] : non-pitting [No Edema] : no edema [No Focal Deficits] : no focal deficits [Normal Color/ Pigmentation] : normal color/ pigmentation [Oriented x3] : oriented x3 [Normal Affect] : normal affect

## 2020-06-16 NOTE — REASON FOR VISIT
[Follow-Up] : a follow-up visit [Abnormal CXR/ Chest CT] : an abnormal CXR/ chest CT [Lung Cancer] : lung cancer [TextBox_44] : Asbestos plaques

## 2020-06-16 NOTE — HISTORY OF PRESENT ILLNESS
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  Patietn had post op visit June 9.  He is feeling overall well.  He denies increased respiratory complaints.  He is here for follow up.

## 2020-06-16 NOTE — PROCEDURE
[FreeTextEntry1] : PFT 3/3/2020 personally reviewed moderate obstructive ventilatory defect without gas exchange abnormality and insignificant bronchodilator response \par \par CT chest 2/19/20 personally reviewed 5.0 x 5.0 x 4.0 cm mass RLL with thickened pleural attachment to large peridiaphragmatic plaque\par \par PET CT personally reviewed 3/10/20 FDG avid opacity right lung base measures approximately 5.5 x 4.4 cm extending to prominent pleural calcification right lung base.\par \par CXR 6/5/20 reveals bilateral calcified pleural plaques, loculated pleural effusion small amount of air

## 2020-06-16 NOTE — ASSESSMENT
[FreeTextEntry1] : 74 year old male former heavy smoker, COPD, asbestos exposure, presents for follow up s/p recent VATS, RLL lobectomy, repeat CXR reveals bilateral pleural plaques, small loculated pleural effusion\par \par Repeat CT chest 3 months\par Referral to Dr. Alan Renteria, Oncology\par Follow up Thoracic Surgery after CT chest, discussed with patient and expresses understanding \par \par Follow up after CT chest\par \par  \par \par \par \par

## 2020-06-23 PROBLEM — Z78.9 NO FAMILY HISTORY OF CANCER: Status: ACTIVE | Noted: 2020-01-01

## 2020-06-23 PROBLEM — Z80.0 FAMILY HISTORY OF COLON CANCER: Status: ACTIVE | Noted: 2020-01-01

## 2020-07-13 PROBLEM — C34.91 MALIGNANT NEOPLASM OF UNSPECIFIED PART OF RIGHT BRONCHUS OR LUNG: Chronic | Status: ACTIVE | Noted: 2020-01-01

## 2020-07-13 PROBLEM — R91.8 OTHER NONSPECIFIC ABNORMAL FINDING OF LUNG FIELD: Chronic | Status: ACTIVE | Noted: 2020-01-01

## 2020-07-13 PROBLEM — M19.90 UNSPECIFIED OSTEOARTHRITIS, UNSPECIFIED SITE: Chronic | Status: ACTIVE | Noted: 2020-01-01

## 2020-07-13 PROBLEM — N40.0 BENIGN PROSTATIC HYPERPLASIA WITHOUT LOWER URINARY TRACT SYMPTOMS: Chronic | Status: ACTIVE | Noted: 2020-01-01

## 2020-07-13 PROBLEM — R59.0 LOCALIZED ENLARGED LYMPH NODES: Chronic | Status: ACTIVE | Noted: 2020-01-01

## 2020-07-13 PROBLEM — J92.0 PLEURAL PLAQUE WITH PRESENCE OF ASBESTOS: Chronic | Status: ACTIVE | Noted: 2020-01-01

## 2020-08-19 PROBLEM — C34.31 MALIGNANT NEOPLASM OF LOWER LOBE OF RIGHT LUNG: Status: ACTIVE | Noted: 2020-01-01

## 2020-10-08 NOTE — MEDICAL STUDENT ADULT H&P (EDUCATION) - NS MD HP STUD PE VITALS FT
Vital Signs Last 24 Hrs  T(C): 36.3 (08 Oct 2020 06:52), Max: 38 (08 Oct 2020 03:49)  T(F): 97.4 (08 Oct 2020 06:52), Max: 100.4 (08 Oct 2020 03:49)  HR: 116 (08 Oct 2020 10:30) (116 - 157)  BP: 53/41 (08 Oct 2020 10:30) (50/22 - 152/105)  BP(mean): 46 (08 Oct 2020 10:30) (19 - 124)  RR: 35 (08 Oct 2020 10:42) (28 - 46)  SpO2: 87% (08 Oct 2020 09:00) (51% - 100%)

## 2020-10-08 NOTE — PROVIDER CONTACT NOTE (EICU) - RECOMMENDATIONS
Family has requested DNR/DNI (no aggressive measures such as central line or pressors).  Pt's work of breathing is compensation for underlying metabolic acidosis due to lactic acidosis.   Could consider bicarbonate replacement. At this time agree with IVF resuscitation first. ?low flow state causing ischemia is a possibility.   CT head and abd/pelvis planned.  Empiric abx with vanco and zosyn   Pt accepted to  ICU.  Discussed with ICU PA.

## 2020-10-08 NOTE — MEDICAL STUDENT ADULT H&P (EDUCATION) - NS MD HP STUD CHIEF COMPLAINT FT
Patient is a 74y old  Male who presents with a chief complaint of respiratory distress (08 Oct 2020 10:16)

## 2020-10-08 NOTE — H&P ADULT - HISTORY OF PRESENT ILLNESS
73 y/o male with pmhx of stage II lung cancer (diagnosed 5 weeks ago, started on chemotherapy), DM II, HTN, hemorrhagic CVA who recently had a prolonged hospitalization at [facility] for multiple reasons. Per family, he initially presented there with shortness of breath and was diagnosed with a malignant pleural effusion and stage II lung cancer. While there he developed AMS and ended up receiving tpa complicated by hemorrhagic conversion. He was also intubated for a period of his hospitalization. There was concern that the focal areas of ischemia on his head CT, along with his sepsis picture were caused by endocarditis from untreated cellulitis last year, as his blood cultures were positive for strep. They never performed a JESSICA on him due to increased risk, but treated him empirically with a 4 week course of abx. He was discharged to [facility] 4 days ago. Family states he has continued to be altered but seemed to be getting better.     Today he presents with shortness of breath and hypoxia. In ER he was satting 80% on arrival in severe respiratory distress. hypotensive to 50/30 in a wide complex rhythm, he received one defibrillation and woke up. started on bipap with improvement in hyopxia. ABG revealed 75 y/o male with pmhx of stage II lung cancer (diagnosed 5 weeks ago, started on chemotherapy), DM II, HTN, asbestos exposure, prior smoker (quit 15 years ago), LBBB, BPH, hemorrhagic CVA s/p tpa, on eliquis for a stent in his left (per family) who recently had a prolonged hospitalization at [facility] for multiple reasons. Per family, he initially presented there with shortness of breath and was diagnosed with a malignant pleural effusion and stage II lung cancer. While there he developed AMS and ended up receiving tpa complicated by hemorrhagic conversion. He was also intubated for a period of his hospitalization. There was concern that the focal areas of ischemia on his head CT, along with his sepsis picture were caused by endocarditis from untreated cellulitis last year, as his blood cultures were positive for strep. They never performed a JESSICA on him due to increased risk, but treated him empirically with a 4 week course of abx. He was discharged to [facility] 4 days ago. Family states he has continued to be altered but seemed to be getting better.     Today he presents with shortness of breath and hypoxia. In ER he was satting 80% on arrival in severe respiratory distress. hypotensive to 50/30 in a wide complex rhythm, he received one defibrillation and woke up. started on bipap with improvement in hyopxia. ABG revealed compensated metabolic acidosis. He is A&O x 1 to person and is agitated.     Per family patient was active and healthy up until just prior to his [facility] hospitalization. 73 y/o male with pmhx of stage II lung cancer (diagnosed 5 weeks ago, started on chemotherapy, s/p RLL lobectomy), DM II, HTN, asbestos exposure, prior smoker (quit 15 years ago), LBBB, BPH, hemorrhagic CVA s/p tpa, on eliquis for a stent in his left (per family) who recently had a prolonged hospitalization at [facility] for multiple reasons. Per family, he initially presented there with shortness of breath and was diagnosed with a malignant pleural effusion and stage II lung cancer. While there he developed AMS and ended up receiving tpa complicated by hemorrhagic conversion. He was also intubated for a period of his hospitalization. There was concern that the focal areas of ischemia on his head CT, along with his sepsis picture were caused by endocarditis from untreated cellulitis last year, as his blood cultures were positive for strep. They never performed a JESSICA on him due to increased risk, but treated him empirically with a 4 week course of abx. He was discharged to [facility] 4 days ago. Family states he has continued to be altered but seemed to be getting better.     Today he presents with shortness of breath and hypoxia. In ER he was satting 80% on arrival in severe respiratory distress. hypotensive to 50/30 in a wide complex rhythm, he received one defibrillation and woke up. started on bipap with improvement in hyopxia. ABG revealed compensated metabolic acidosis. He is A&O x 1 to person and is agitated.     Per family patient was active and healthy up until just prior to his [facility] hospitalization.

## 2020-10-08 NOTE — CONSULT NOTE ADULT - ASSESSMENT
A/P 73 y/o male with pmhx of stage II lung cancer (diagnosed 5 weeks ago, started on chemotherapy), DM II, HTN, asbestos exposure, prior smoker (quit 15 years ago), LBBB, BPH, hemorrhagic CVA, on eliquis for a stent in his left (per family)     Assessment per CCU:   1. Shock suspected septic (cultures pending) possible endocarditis.   2. Acute respiratory failure  3. Metabolic Acidosis with Lactic acidosis   4. Underlying Lung cancer,  DM II, HTN, asbestos exposure, prior smoker (quit 15 years ago), LBBB, BPH, hemorrhagic CVA, on Eliquis  5. septic shock  4. AMS  Plan  per CCU  After family discussion will get hospice eval  patient candidate for inpatient hospice, BUT too unstable for transfer as patient is imminent   d/c NIV place NRB when family arrives   D/C Precedex and give ativan PRN    d/w family they do not want intubation, pressors, central line, at this time abx and fluids will be futile and want his comfort.      Palliative : asked for on going goc, and hospice eval, case reviewed w/ ccu team this Am, and chart reviewed. Met pt bedside and family members present. Pt currently sedated, non verbal, on bi-pap with dilaudid drip in place.  Spoke with daughter and wife at bedside, explained palliative role, reviewed w/ family their main goal at this point is pts comfort, they realize he is in active dying phase. They are waiting for other family members to arrive before removing bi-pap and increasing dilaudid.  I discussed with wife and daughter hospice services, and explained pt would not be transferred at this time due to pts current state, but did explain benefit of having bereavement  services for family afterwards. They agreed to speak with hospice representative for questions , but they feel at this time, it would be of no benefit . [facility] made aware of referral, they did reach family, and family  did decline hospice services  at this time.  Palliative  will  follow for support and comfort. POC reviewed with ccu team and pts RN.   Family very appreciative of support, I offered  for sacrament of sick, family considering and will let us know.   Cont prn dilaudid with continuous  drip    cont prn Ativan      A/P 73 y/o male with pmhx of stage II lung cancer (diagnosed 5 weeks ago, started on chemotherapy), DM II, HTN, asbestos exposure, prior smoker (quit 15 years ago), LBBB, BPH, hemorrhagic CVA, on eliquis for a stent in his left (per family)     Assessment per CCU:   1. Shock suspected septic (cultures pending) possible endocarditis.   2. Acute respiratory failure  3. Metabolic Acidosis with Lactic acidosis   4. Underlying Lung cancer,  DM II, HTN, asbestos exposure, prior smoker (quit 15 years ago), LBBB, BPH, hemorrhagic CVA, on Eliquis  5. septic shock  4. AMS  Plan  per CCU  After family discussion will get hospice eval  patient candidate for inpatient hospice, BUT too unstable for transfer as patient is imminent   d/c NIV place NRB when family arrives   D/C Precedex and give ativan PRN    d/w family they do not want intubation, pressors, central line, at this time abx and fluids will be futile and want his comfort.      Palliative : asked for on going goc, and hospice eval, case reviewed w/ ccu team this Am, and chart reviewed. Met pt bedside and family members present. Pt currently sedated, non verbal, on bi-pap with dilaudid drip in place.  Spoke with daughter and wife at bedside, explained palliative role, reviewed w/ family their main goal at this point is pts comfort, they realize he is in active dying phase. They are waiting for other family members to arrive before removing bi-pap and increasing dilaudid.  I discussed with wife and daughter hospice services, and explained pt would not be transferred at this time due to pts current state, but did explain benefit of having bereavement  services for family afterwards. They agreed to speak with hospice representative for questions , but they feel at this time, it would be of no benefit . [facility] made aware of referral, they did reach family, and family  did decline hospice services  at this time.  Palliative  will  follow for support and comfort. POC reviewed with ccu team and pts RN.   Family very appreciative of support.   I offered  for sacrament of sick, family considering and will let us know.   Cont prn dilaudid with continuous  drip    cont prn Ativan

## 2020-10-08 NOTE — H&P ADULT - NSICDXPASTMEDICALHX_GEN_ALL_CORE_FT
PAST MEDICAL HISTORY:  Adenocarcinoma of right lung     Asthma     BPH (benign prostatic hyperplasia)     CAD (coronary artery disease)     CKD (chronic kidney disease)     COPD (chronic obstructive pulmonary disease)     Elevated LFTs details unknown from patient    Enlarged prostate with lower urinary tract symptoms (LUTS)     HTN (hypertension)     Hyperlipidemia     Hyperparathyroidism     LBBB (left bundle branch block)     Lung nodules     Mediastinal lymphadenopathy     Obese     Osteoarthritis     PAD (peripheral artery disease) stent 2017    Pleural plaque due to asbestos exposure     Pre-diabetes     PVD (peripheral vascular disease)     Type 2 diabetes mellitus

## 2020-10-08 NOTE — PROGRESS NOTE ADULT - ASSESSMENT
Physical Examination:  GENERAL:               Sedated eyes closed, in respiratory distress on NIV  HEENT:                    Dry MM no jvd  PULM:                     Bilateral air entry, course to auscultation bilaterally, +significant sputum production,  CVS:                         S1, S2,  + Murmur  ABD:                        Soft, nondistended, nontender, normoactive bowel sounds,   EXT:                         No edema,  No Cyanosis or Clubbing   Vascular:                cool Extremities, Normal Capillary refill, + Distal Pulses  NEURO:                  sedated, unresponsive  PSYC:                      Agitated , No Insight.        Assessment  1. Shock suspected septic (cultures pending) possible endocarditis.   2. Acute respiratory failure  3. Metabolic Acidosis with Lactic acidosis   4. Underlying Lung cancer,  DM II, HTN, asbestos exposure, prior smoker (quit 15 years ago), LBBB, BPH, hemorrhagic CVA, on Eliquis      Plan  After family discussion will get hospice eval  patient candidate for inpatient hospice, BUT too unstable for transfer as patient is imminent   d/c NIV place NRB when family arives  D/C Precedex and give ativan PRN  Palliative care eval.   d/w family they do not want intubation, pressors, central line, at this time abx and fluids will be futile and want his comfort.            Family Updated: 	Wife/DTR	                                 Date: 10/8  Sedation & Analgesia:	n/a  Diet/Nutrition:		n/a  GI PPx:			N/a  DVT Ppx:		n/a  	RISK                                                          Points  	[ ] Previous VTE                                           	3  	[ ] Thrombophilia                                        	2  	[ ] Lower limb paralysis                              	2   	[x ] Current Cancer                                       	2   	[ ] Immobilization > 24 hrs                        	1  	[ x] ICU/CCU stay > 24 hours                       	1  	[x ] Age > 60                                                   	1  		Total:[ 4]      Activity:		       Bedrest          Lines: PIV  Restraints were deemed necessary to prevent removal of life-sustaining devices [  ] YES   [  x  ]  NO    Disposition: Comfort measures, patient in active phase of dying , will make family and patient as comfortable as possible     Goals of Care: DNR/I Comfort measures.

## 2020-10-08 NOTE — H&P ADULT - ASSESSMENT
75 y/o male with pmhx of stage II lung cancer (diagnosed 5 weeks ago, started on chemotherapy), DM II, HTN, asbestos exposure, prior smoker (quit 15 years ago), LBBB, BPH, hemorrhagic CVA, on eliquis for a stent in his left (per family) now with:    1. acute hypoxic respiratory failure  2. severe metabolic acidosis  3. septic shock  4. AMS  5. hyperglycemia  6. JULIET    NEURO:  CVS:  PULM:  GI:  RENAL:  ID:  ENDO:  HEME:  DISPO: DNR/DNI. discussed with family they do not want aggressive measures and feel that patient has been through enough. He made himself DNR during last hospitalization and therefore family feels that he has "had enough" and should he deteriorate further they would want to transition to comfort measures.      60 minutes of critical care time spent providing medical care for patient's acute illness/conditions that impairs at least one vital organ system and/or poses a high risk of imminent or life threatening deterioration in the patient's condition. It includes time spent evaluating and treating the patient's acute illness as well as time spent reviewing labs, radiology, discussing goals of care with patient and/or patient's family, and discussing the case with a multidisciplinary team in an effort to prevent further life threatening deterioration or end organ damage. This time is independent of any procedures performed. 75 y/o male with pmhx of stage II lung cancer (diagnosed 5 weeks ago, started on chemotherapy), DM II, HTN, asbestos exposure, prior smoker (quit 15 years ago), LBBB, BPH, hemorrhagic CVA, on eliquis for a stent in his left (per family) now with:    1. acute hypoxic respiratory failure  2. severe metabolic acidosis  3. septic shock  4. AMS  5. hyperglycemia  6. JULIET    NEURO: metabolic encephalopathy as well as AMS secondary to CVA. CT head pending. start precedex to assist with bipap compliance.  CVS: received 1 liter NS in ER, bolus additional liter. goal MAP > 65.   PULM: on bipap 12/5, requiring 100% fio2. tachypneic to 35. given tachycardia and hypoxia, must consider PE. Due to JULIET and family wishes, CTA deferred. will get LE duplex and start empirically on heparin drip pending CT head.   GI: keep npo  RENAL: JULIET due to prerenal azotemia. renally dose meds, avoid nephrotoxin.   ID: sepsis unknown etiology, bolus 30 cc/kg IBW. start empiric abx with health care associated coverage and Gram + coverage given recent picc and sacral wound. vanc and zosyn. trend lactate, wbc , fever curve.  ENDO: accuchecks q6 hours with sliding scale coverage. goal 140-180.   HEME: start heparin drip pending ct head.  DISPO: DNR/DNI. discussed with family they do not want aggressive measures and feel that patient has been through enough. He made himself DNR during last hospitalization and therefore family feels that he has "had enough" and should he deteriorate further they would want to transition to comfort measures. They do not want a central line or pressors.       discussed with eicu dr martinez.      60 minutes of critical care time spent providing medical care for patient's acute illness/conditions that impairs at least one vital organ system and/or poses a high risk of imminent or life threatening deterioration in the patient's condition. It includes time spent evaluating and treating the patient's acute illness as well as time spent reviewing labs, radiology, discussing goals of care with patient and/or patient's family, and discussing the case with a multidisciplinary team in an effort to prevent further life threatening deterioration or end organ damage. This time is independent of any procedures performed. 75 y/o male with pmhx of stage II lung cancer (diagnosed 5 weeks ago, started on chemotherapy), DM II, HTN, asbestos exposure, prior smoker (quit 15 years ago), LBBB, BPH, hemorrhagic CVA, on eliquis for a stent in his left (per family) now with:    1. acute hypoxic respiratory failure  2. severe metabolic acidosis  3. septic shock  4. AMS  5. hyperglycemia  6. JULIET    NEURO: metabolic encephalopathy as well as AMS secondary to CVA. CT head pending. start precedex to assist with bipap compliance.  CVS: received 1 liter NS in ER, bolus additional liter. goal MAP > 65.   PULM: started on bipap 12/5, requiring 100% fio2. tachypneic to 35. given tachycardia and hypoxia, must consider PE. Due to JULIET and family wishes, CTA deferred. will get LE duplex and start empirically on heparin drip pending CT head.   GI: keep npo  RENAL: JULIET due to prerenal azotemia. renally dose meds, avoid nephrotoxin.   ID: sepsis unknown etiology, bolus 30 cc/kg IBW. start empiric abx with health care associated coverage and Gram + coverage given recent picc and sacral wound. vanc and zosyn. trend lactate, wbc , fever curve.  ENDO: accuchecks q6 hours with sliding scale coverage. goal 140-180.   HEME: start heparin drip pending ct head.  DISPO: DNR/DNI. discussed with family they do not want aggressive measures and feel that patient has been through enough. He made himself DNR during last hospitalization and therefore family feels that he has "had enough" and should he deteriorate further they would want to transition to comfort measures. They do not want a central line or pressors.       discussed with eicu dr martinez.      ADDENDUM: after fluid resuscitation, patient remains hypotensive with MAP of 57. appears uncomfortable on bipap. per family request, transition to comfort care. started on dilaudid infusion with ativan prn.       60 minutes of critical care time spent providing medical care for patient's acute illness/conditions that impairs at least one vital organ system and/or poses a high risk of imminent or life threatening deterioration in the patient's condition. It includes time spent evaluating and treating the patient's acute illness as well as time spent reviewing labs, radiology, discussing goals of care with patient and/or patient's family, and discussing the case with a multidisciplinary team in an effort to prevent further life threatening deterioration or end organ damage. This time is independent of any procedures performed.

## 2020-10-08 NOTE — ED ADULT NURSE REASSESSMENT NOTE - NS ED NURSE REASSESS COMMENT FT1
Unable to collect urine sample prior to antibiotic administration. Spangler catheter in place upon arrival, removed and replaced, no urine output from new catheter.    Sepsis protocol followed, IV fluids not ordered at 30cc/kg initially due to r/o covid vs. CHF.   additional IV fluids ordered upon lactace result.

## 2020-10-08 NOTE — MEDICAL STUDENT ADULT H&P (EDUCATION) - NS MD HP STUD HX OF PRESENT ILLNESS FT
74 year old Male DNR/DNI, PMH DM2, HTN, asbestos exposure, prior smoker (quit 15 years ago), LBBB, BPH, recently discharged from [facility] to [facility] 5 days ago after complicated hospital course where pt presented for SOB, found to have malignant pleural effusion, dx with stage II lung cancer (dx 5 weeks ago, started on chemotherapy, s/p RLL lobectomy), hemorrhagic CVA conversion s/p tpa, intubated for a period of time during this admission, sepsis (blood cultures were positive for strep) and endocarditis (unable to be confirmed on JESSICA due to being unstable) treated with LUE PICC antibiotics x 4 weeks. Family reported pt was still altered but improving at Rehab up until yesterday. Overnight pt came in to the ER for acute respiratory distress, O2 sats in the 80%s, and hypoxic respiratory failure, requiring NIV BiPAP, pt was also hypotensive 50s/30s, with wide complex VT rhythm, ER gave one defib and pt woke up after. Pt slighlty improved on BiPAP in ER. ABG showed compensated metabolic acidosis.     Overnight Events: Since pt came in to ICU overnight he has been in acute respiratory distress on NIV BiPAP, very tachypneic, placed on Precedex (since discontinued), Ativan PRN and Dilaudid gtt. Family arrived to bedside and wishes to make pt comfort care (no CVC, pressors, intubation) they also wish to remove BiPAP when all family members arrive at 10am and to place pt on NRB for comfort.

## 2020-10-08 NOTE — ED PROVIDER NOTE - CONSTITUTIONAL, MLM
normal... 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 but otherwise confused

## 2020-10-08 NOTE — PROCEDURE NOTE - NSPROCDETAILS_GEN_ALL_CORE
sterile technique, catheter placed/dressing applied/ultrasound utilization/secured in place/location identified, draped/prepped, sterile technique used/blood seen on insertion/flushes easily

## 2020-10-08 NOTE — PROCEDURE NOTE - ADDITIONAL PROCEDURE DETAILS
procedure performed independent of documented critical care time      needle tip visualized in compressible vessel, with dark, nonpulsatile blood return. flushes easily. images obtained.

## 2020-10-08 NOTE — ED ADULT NURSE NOTE - PMH
Adenocarcinoma of right lung    Asthma    BPH (benign prostatic hyperplasia)    CAD (coronary artery disease)    CKD (chronic kidney disease)    COPD (chronic obstructive pulmonary disease)    Elevated LFTs  details unknown from patient  Enlarged prostate with lower urinary tract symptoms (LUTS)    HTN (hypertension)    Hyperlipidemia    Hyperparathyroidism    LBBB (left bundle branch block)    Lung nodules    Mediastinal lymphadenopathy    Obese    Osteoarthritis    PAD (peripheral artery disease)  stent 2017  Pleural plaque due to asbestos exposure    Pre-diabetes    PVD (peripheral vascular disease)    Type 2 diabetes mellitus

## 2020-10-08 NOTE — MEDICAL STUDENT ADULT H&P (EDUCATION) - NS MD HP STUD ROS GENERAL FT
Review of Systems:       Unable to obtain due to: pt is only responsive to painful stimuli, altered, agitated, in respiratory distress on NIV BiPAP

## 2020-10-08 NOTE — H&P ADULT - NSICDXPASTSURGICALHX_GEN_ALL_CORE_FT
PAST SURGICAL HISTORY:  H/O arthroscopy of right knee     History of lobectomy of lung     S/P tonsillectomy

## 2020-10-08 NOTE — PROGRESS NOTE ADULT - SUBJECTIVE AND OBJECTIVE BOX
Addendum to H&P/ICU Consult note  - Patient seen and examined today    ICU CONSULT  Location of Patient :  [facility] [id number] ( [facility])  Attending requesting Consult:Buddy Pedersen  Chief Complaint :  Hypoxia  Reason For consult : Reapiratory failure      Initial HPI on admission:  HPI:  74 year old male with h/o Lung cancer diagnosed 5 weeks ago in [place] s/p RLL lobectomy started on chemo with complex hospital course where had CVA s/p TPA with hematogic conversion and bacteremia with suspected endocarditis s/p 4 weeks of abx, DM2, Asbestos exposure, Ex Smoker, LBBB, BPH, who was sent overnight from Rehab for increasing SOB and hypoxic respiratory failure.   IN ED he was satting 80% on arrival in severe respiratory distress. hypotensive to 50/30 in a wide complex rhythm, he received one defibrillation and woke up. started on bipap with improvement in hyopxia. ABG revealed compensated metabolic acidosis. He is A&O x 1 to person and is agitated.     Per family patient was active and healthy up until just prior to his [place] hospitalization.  (08 Oct 2020 04:33)      BRIEF HOSPITAL COURSE:   patient transferred to ICU for closer care and monitoring  Wife and DTR bedside, extensive discussion had about GOC, and management   patient currently on NIV and Dilaudid drip, and want him to be comfortable  they want patient to be comfortable  would listen into what hospice has to say.   they want him to stay in the room he is in and not move  they would want to remove NIV and try to make him comfortable    PAST MEDICAL & SURGICAL HISTORY:  BPH (benign prostatic hyperplasia)    Osteoarthritis    Mediastinal lymphadenopathy    Lung nodules    Pleural plaque due to asbestos exposure    Adenocarcinoma of right lung    Enlarged prostate with lower urinary tract symptoms (LUTS)    PVD (peripheral vascular disease)    LBBB (left bundle branch block)    Type 2 diabetes mellitus    CAD (coronary artery disease)    Hyperparathyroidism    Obese    Elevated LFTs  details unknown from patient    CKD (chronic kidney disease)    COPD (chronic obstructive pulmonary disease)    Pre-diabetes    PAD (peripheral artery disease)  stent 2017    Asthma    Hyperlipidemia    HTN (hypertension)    History of lobectomy of lung    H/O arthroscopy of right knee    S/P tonsillectomy      Allergies    No Known Allergies    Intolerances    Due to current medical status patient unable to provide medical, social, family history.  History obtained from available medical record.     Medications:  MEDICATIONS  (STANDING):  atorvastatin 20 milliGRAM(s) Oral at bedtime  dexMEDEtomidine Infusion 0.2 MICROgram(s)/kG/Hr (3.77 mL/Hr) IV Continuous <Continuous>  dextrose 5%. 1000 milliLiter(s) (50 mL/Hr) IV Continuous <Continuous>  dextrose 50% Injectable 12.5 Gram(s) IV Push once  dextrose 50% Injectable 25 Gram(s) IV Push once  dextrose 50% Injectable 25 Gram(s) IV Push once  folic acid 1 milliGRAM(s) Oral daily  HYDROmorphone Infusion 1 mG/Hr (1 mL/Hr) IV Continuous <Continuous>  insulin lispro (HumaLOG) corrective regimen sliding scale   SubCutaneous every 6 hours  lactated ringers Bolus 300 milliLiter(s) IV Bolus once    MEDICATIONS  (PRN):  atropine 1% Ophthalmic Solution for SubLingual Use 1 Drop(s) SubLingual every 4 hours PRN excessive secretions  dextrose 40% Gel 15 Gram(s) Oral once PRN Blood Glucose LESS THAN 70 milliGRAM(s)/deciliter  glucagon  Injectable 1 milliGRAM(s) IntraMuscular once PRN Glucose LESS THAN 70 milligrams/deciliter  HYDROmorphone  Injectable 1 milliGRAM(s) IV Push every 4 hours PRN Moderate Pain (4 - 6)  LORazepam   Injectable 1 milliGRAM(s) IV Push every 4 hours PRN respiratory distress      Home Medications:  Last Order Reconciliation Date: 10-08-20 (Admission Reconciliation)  amLODIPine 10 mg oral tablet: 1 tab(s) orally once a day (08-05-20)  apixaban 5 mg oral tablet: 1 tab(s) orally every 12 hours (08-05-20)  Aspir 81 oral delayed release tablet: 1 tab(s) orally once a day (08-05-20)  atorvastatin 20 mg oral tablet: 1 tab(s) orally once a day (08-05-20)  dexamethasone 4 mg oral tablet: 4 milligram(s) orally twice a day on the day before chemotherapy and the day after  (08-05-20)  folic acid 1 mg oral tablet: 1 tab(s) orally once a day (08-05-20)  Lasix 40 mg oral tablet: 1 tab(s) orally once a day (08-05-20)  losartan 50 mg oral tablet: 1 tab(s) orally once a day (08-05-20)  metoclopramide 10 mg oral tablet: 10 milligram(s) orally every 6 hours, As Needed (08-05-20)  metoprolol succinate 50 mg oral tablet, extended release: 1 tab(s) orally once a day (08-05-20)  oxyCODONE 5 mg oral tablet: 2 tab(s) orally every 6 hours, As Needed for severe pain. Can take 1 tab for moderate pain. MDD:8 (08-05-20)  Pepcid 40 mg oral tablet: 40 milligram(s) orally once a day, As Needed (08-05-20)  senna oral tablet: 2 tab(s) orally once a day (at bedtime) (08-05-20)  tamsulosin 0.4 mg oral capsule: 1 cap(s) orally once a day (08-05-20)  Tylenol 325 mg oral tablet: 2 tab(s) orally every 6 hours, As Needed (08-05-20)        LABS:                        11.9   11.08 )-----------( 516      ( 08 Oct 2020 03:45 )             40.6     10-08    137  |  99  |  15  ----------------------------<  227<H>  3.7   |  20<L>  |  1.81<H>    Ca    9.3      08 Oct 2020 03:45  Mg     2.2     10-08    TPro  7.7  /  Alb  2.7<L>  /  TBili  1.3<H>  /  DBili  x   /  AST  39  /  ALT  36  /  AlkPhos  111  10-08    HIT ab -- 10-08 @ 04:00  HIT ab EIA --  D Dimer -4325    CARDIAC MARKERS ( 08 Oct 2020 03:45 )  .031 ng/mL / x     / x     / x     / x            PT/INR - ( 08 Oct 2020 03:45 )   PT: 14.3 sec;   INR: 1.19 ratio         PTT - ( 08 Oct 2020 03:45 )  PTT:29.9 sec      Serum Pro-Brain Natriuretic Peptide: 1679 pg/mL (10-08-20 @ 03:45)    COVID  10-08-20 @ 04:00  COVID -   NotDete      COVID Biomarkers    10-08-20 @ 04:00 ESR --  ---  CRP --  ---  DDimer  4325<H>   ---   LDH --   ---   Ferritin --            Trend Cardiac Enzymes  10-08-20 @ 03:45  [id number]/Trop I - -- - --  - --  -  --  /  .031    Trend BNP  10-08-20 @ 03:45   -  1679<H>    Procalcitonin Trend  05-30-19 @ 08:44   -   0.57<H>  05-27-19 @ 13:46   -   2.39<H>    WBC Trend  10-08-20 @ 03:45   -  11.08<H>    H/H Trend  10-08-20 @ 03:45   -  11.9<L> / 40.6    Platelet Trend  10-08-20 @ 03:45   -  516<H>    Trend Sodium  10-08-20 @ 03:45   -  137    Trend Potassium  10-08-20 @ 03:45   -  3.7    Trend Bun/Cr  10-08-20 @ 03:45  BUN/CR -  15 / 1.81<H>    Lactic Acid Trend  10-08-20 @ 03:45   -   12.7<HH>    ABG Trend  10-08-20 @ 04:25   - 7.40/19<L>/415<H>/>99<H>    Trend AST/ALT/ALK Phos/Bili  10-08-20 @ 03:45   39/36/111/1.3<H>  05-28-19 @ 05:44   33/33/55/0.9  05-27-19 @ 13:46   39<H>/35/56/0.9        Albumin Trend  10-08-20 @ 03:45   -   2.7<L>  05-28-19 @ 05:44   -   2.8<L>  05-27-19 @ 13:46   -   3.5      PTT - PT - INR Trend  10-08-20 @ 03:45   -   29.9 - 14.3<H> - 1.19<H>  05-20-20 @ 08:25   -   31.2 - 11.3 - 0.98      ABG - ( 08 Oct 2020 04:25 )  pH, Arterial: 7.40  pH, Blood: x     /  pCO2: 19    /  pO2: 415   / HCO3: x     / Base Excess: x     /  SaO2: >99         COVID  10-08-20 @ 04:00  COVID -   NotDetec    PTT - PT - INR Trend  10-08-20 @ 03:45   -   29.9 - 14.3<H> - 1.19<H>  05-20-20 @ 08:25   -   31.2 - 11.3 - 0.98    Glucose Trend  10-08-20 @ 03:45   -  -- 227<H> --  10-08-20 @ 03:36   -  -- -- 151<H>    A1C with Estimated Average Glucose: 6.3 % <H> [4.0 - 5.6] (05-21-20 @ 07:19)    RADIOLOGY  CXR:    < from: Xray Chest 1 View-PORTABLE IMMEDIATE (10.08.20 @ 03:56) >  PROCEDURE DATE:  10/08/2020        INTERPRETATION:  CLINICAL HISTORY: . Chest Pain.    TECHNIQUE: Single portable AP view of the chest.    COMPARISON: Chest CT 8/21/2020, outside study. Chest radiographs 6/5/2020.    FINDINGS:  Interval decrease in prominence of collection at the right cardiophrenic angle, corresponding to a loculated fluid and gas collection on the comparison CT.    Nodular airspace opacities noted in the projecting over the left midlung again, corresponding with calcified pleural plaques best seen on the comparison CT. Linear atelectasis noted at the left lung base.    Cardiomediastinal silhouette is unremarkable.    Included upper abdomen is unremarkable.    IMPRESSION:  Decreased prominence of collection at the right cardiophrenic angle.    No new airspace consolidation identified.    < end of copied text >    CT/ECHO:  not done patient comfort measures      VITALS:  T(C): 36.3 (10-08-20 @ 06:52), Max: 38 (10-08-20 @ 03:49)  T(F): 97.4 (10-08-20 @ 06:52), Max: 100.4 (10-08-20 @ 03:49)  HR: 119 (10-08-20 @ 09:30) (119 - 157)  BP: 72/57 (10-08-20 @ 09:30) (50/22 - 152/105)  BP(mean): 64 (10-08-20 @ 09:30) (19 - 124)  ABP: --  ABP(mean): --  RR: 37 (10-08-20 @ 09:30) (28 - 46)  SpO2: 87% (10-08-20 @ 09:00) (51% - 100%)  CVP(mm Hg): --  CVP(cm H2O): --    Ins and Outs     10-07-20 @ 07:01  -  10-08-20 @ 07:00  --------------------------------------------------------  IN: 0 mL / OUT: 0 mL / NET: 0 mL    10-08-20 @ 07:01  -  10-08-20 @ 10:16  --------------------------------------------------------  IN: 0 mL / OUT: 0 mL / NET: 0 mL        Height (cm): 182.9 (10-08-20 @ 06:52)  Weight (kg): 82.8 (10-08-20 @ 06:52)  BMI (kg/m2): 24.8 (10-08-20 @ 06:52)        I&O's Detail    07 Oct 2020 07:01  -  08 Oct 2020 07:00  --------------------------------------------------------  IN:  Total IN: 0 mL    OUT:    Indwelling Catheter - Urethral (mL): 0 mL  Total OUT: 0 mL    Total NET: 0 mL      08 Oct 2020 07:01  -  08 Oct 2020 10:16  --------------------------------------------------------  IN:  Total IN: 0 mL    OUT:    Indwelling Catheter - Urethral (mL): 0 mL  Total OUT: 0 mL    Total NET: 0 mL

## 2020-10-08 NOTE — MEDICAL STUDENT ADULT H&P (EDUCATION) - NS MD HP STUD ASPLAN PLAN FT
Neuro: Hx AMS s/p CVA now with metabolic encephalopathy likely secondary to respiratory distress.  Ativan PRN and Dilaudid gtts for comfort.     Cardiac: Hypotensive to 50s/30s, received 1L NS in ER, LR bolus given in ICU, tachycardic 110s with known LBB.   No CVC or pressors as per family.    Pulmonary: Acute respiratory distress, Hypoxic respiratory failure, now on NRB for comfort.  Atropine sublingual PRN  for secretions.    GI: NPO.  Renal: JULIET due to prerenal hypotension secondary to MODS.  Endo: No finger sticks or ISS because pt is comfort care.   ID: Sepsis source unknown. Pt received IV fluids. No abx as pt is comfort care as per family.  Fevers, sites of infection, leukocytosis, leukopenia, procedures to dx or control infections, culture data, antibiotics.    GOC: DNR/DNI, comfort care as per family and patient's wishes (he made himself a DNR/DNI from last hospitalization).

## 2020-10-08 NOTE — ED PROVIDER NOTE - CLINICAL SUMMARY MEDICAL DECISION MAKING FREE TEXT BOX
acute resp distress, hypoxia. partial ddx: acs, acute chf, PNA, PE, COVID.  BIPAP started for respiratory support.  IV fluid bolus given after brief hypotension (50s/30s).  wide complex tachy HR ~150 on presentation, pt was shocked 200j as pt unstable with concern for possible vtach, although rhythm more likely sinus tach with LBBB (h/o LBBB). No change p shock. iv abx started for possible RLL pna. T 100.4 pr. PE lower likelihood given pt on eliquis.  unable to do CTA at this time as pt unstable.  icu consulted    pt has DNR. discussed with family /HCP Herber Toney wife, Sharon Escobar re advanced directives, they confirm DNR and wishes pt to be DNI status. does want to continue BIPAP, meds, fluids. acute resp distress, hypoxia. partial ddx: acs, acute chf, PNA, PE, COVID.  BIPAP started for respiratory support.  IV fluid bolus given after brief hypotension (50s/30s).  wide complex tachy HR ~150 on presentation, pt was shocked 200j as pt unstable with concern for possible vtach, although rhythm more likely sinus tach with LBBB (h/o LBBB). No change p shock. iv abx started for possible RLL pna. T 100.4 pr. PE lower likelihood given pt on eliquis.  unable to do CTA at this time as pt unstable.  icu consulted    pt has DNR. discussed with family /HCP Herber Toney wife, Sharon Escobar re advanced directives, they confirm DNR and wishes pt to be DNI status. does want to continue BIPAP, meds, fluids.    full sepsis IV fluid bolus held/delayed due to concern for possible CHF , COVID

## 2020-10-08 NOTE — MEDICAL STUDENT ADULT H&P (EDUCATION) - NS MD HP STUD MEDICATIONS FT
MEDICATIONS  (STANDING):  atorvastatin 20 milliGRAM(s) Oral at bedtime  dextrose 5%. 1000 milliLiter(s) (50 mL/Hr) IV Continuous <Continuous>  folic acid 1 milliGRAM(s) Oral daily  HYDROmorphone Infusion 1 mG/Hr (1 mL/Hr) IV Continuous <Continuous>  insulin lispro (HumaLOG) corrective regimen sliding scale   SubCutaneous every 6 hours  lactated ringers Bolus 300 milliLiter(s) IV Bolus once    MEDICATIONS  (PRN):  atropine 1% Ophthalmic Solution for SubLingual Use 1 Drop(s) SubLingual every 4 hours PRN excessive secretions  dextrose 40% Gel 15 Gram(s) Oral once PRN Blood Glucose LESS THAN 70 milliGRAM(s)/deciliter  glucagon  Injectable 1 milliGRAM(s) IntraMuscular once PRN Glucose LESS THAN 70 milligrams/deciliter  HYDROmorphone  Injectable 1 milliGRAM(s) IV Push every 4 hours PRN Moderate Pain (4 - 6)  HYDROmorphone  Injectable 1 milliGRAM(s) IV Push every 1 hour PRN Respiratory distress.  LORazepam   Injectable 1 milliGRAM(s) IV Push every 4 hours PRN respiratory distress

## 2020-10-08 NOTE — ED PROVIDER NOTE - OBJECTIVE STATEMENT
pt sent from NH for severe resp distress tonight and hypoxia, SpO2 84% on NRB. with reported BP90/50.  pt c h/o stage 2 lung ca s/p resection and chemo may 2020, COPD, CAD, NSTEMI, cva, LBBB.  pt unable to give coherent history. states he is fine, not short of breath. denies pain. pt confused.

## 2020-10-08 NOTE — ED PROVIDER NOTE - RESPIRATORY, MLM
decreased breath sounds lower lung fields bilat. upper lung fields clear and equal.  severe resp distress, labored

## 2020-10-08 NOTE — ED PROVIDER NOTE - CRITICAL CARE PROVIDED
documentation/direct patient care (not related to procedure)/consultation with other physicians/interpretation of diagnostic studies/conducted a detailed discussion of DNR status/additional history taking/consult w/ pt's family directly relating to pts condition

## 2020-10-08 NOTE — MEDICAL STUDENT ADULT H&P (EDUCATION) - NS MD HP STUD MED REC FT
Home Medications:  amLODIPine 10 mg oral tablet: 1 tab(s) orally once a day (05 Aug 2020 10:26)  Aspir 81 oral delayed release tablet: 1 tab(s) orally once a day (05 Aug 2020 10:26)  atorvastatin 20 mg oral tablet: 1 tab(s) orally once a day (05 Aug 2020 10:26)  dexamethasone 4 mg oral tablet: 4 milligram(s) orally twice a day on the day before chemotherapy and the day after  (05 Aug 2020 10:26)  folic acid 1 mg oral tablet: 1 tab(s) orally once a day (05 Aug 2020 10:26)  Lasix 40 mg oral tablet: 1 tab(s) orally once a day (05 Aug 2020 10:26)  losartan 50 mg oral tablet: 1 tab(s) orally once a day (05 Aug 2020 10:26)  metoclopramide 10 mg oral tablet: 10 milligram(s) orally every 6 hours, As Needed (05 Aug 2020 10:26)  metoprolol succinate 50 mg oral tablet, extended release: 1 tab(s) orally once a day (05 Aug 2020 10:26)  Pepcid 40 mg oral tablet: 40 milligram(s) orally once a day, As Needed (05 Aug 2020 10:26)  senna oral tablet: 2 tab(s) orally once a day (at bedtime) (05 Aug 2020 10:26)  tamsulosin 0.4 mg oral capsule: 1 cap(s) orally once a day (05 Aug 2020 10:26)  Tylenol 325 mg oral tablet: 2 tab(s) orally every 6 hours, As Needed (05 Aug 2020 10:26)

## 2020-10-08 NOTE — MEDICAL STUDENT ADULT H&P (EDUCATION) - NS MD HP STUD PMH FT
BPH (benign prostatic hyperplasia)  Osteoarthritis  Mediastinal lymphadenopathy  Pleural plaque due to asbestos exposure  Adenocarcinoma of right lung  Enlarged prostate with lower urinary tract symptoms (LUTS)  PVD/PAD  LBBB (left bundle branch block)  Type 2 diabetes mellitus  CAD (coronary artery disease) stent 2017  Hyperparathyroidism  Obese  CKD (chronic kidney disease)  COPD, Asthma  HLD  HTN

## 2020-10-08 NOTE — H&P ADULT - NSHPPHYSICALEXAM_GEN_ALL_CORE
General: agitated. ill appearing.   Head: Normocephalic, atraumatic.   EENT: Sclera white. PERRL, EOM intact. Secretions normal. no cervical lymphadenopathy  PULM: Breath sounds clear to auscultation symmetrically throughout all lung fields. No wheezing, rhonchi, or rales. using SCM muscles. tachypneic to 35.  CVS: sinus tachycardia, wide complex. No murmurs or rubs. Pulses 2+ on upper and lower extremities bilaterally.   GI: nondistended with bowel sounds normoactive in all four quadrants. No tenderness to light or deep palpation.   Neuro: A&O x 1 to person. unsure of date and thinks cassius is president.   Skin: mottled. No lesions, rashes, ulcers. Extremities equally warm bilaterally.

## 2020-10-08 NOTE — GOALS OF CARE CONVERSATION - ADVANCED CARE PLANNING - CONVERSATION DETAILS
[facility]    This writer received referral for inpatient hospice .This writer spoke with daughter Sharon to discuss inpt hospice. Daughter stated they are all saying goodbye and they do not need hospice services at this time.     Monique Hernandez RN
Patient is being admitted with severe septic shock and acute hypoxic respiratory failure. Unknown etiology of sepsis. Given his lactate of 12.7, JULIET, and severe respiratory distress and shock minimally responsive to IVF, I discussed with patients family the poor prognosis. Per family, patient has been through a lot the last 5 weeks and has come out of his prolonged hospitalization as a different person and has a quality of life that he would not want. He was previously intubated and they do not feel that it is something he would want again, even if for temporary measures. In addition, they do not want any invasive procedures. They only want conservative measures and should he deteriorate further would like to make him full comfort care.

## 2020-10-08 NOTE — CONSULT NOTE ADULT - SUBJECTIVE AND OBJECTIVE BOX
HPI:  73 y/o male with pmhx of stage II lung cancer (diagnosed 5 weeks ago, started on chemotherapy, s/p RLL lobectomy), DM II, HTN, asbestos exposure, prior smoker (quit 15 years ago), LBBB, BPH, hemorrhagic CVA s/p tpa, on eliquis for a stent in his left (per family) who recently had a prolonged hospitalization at [facility] for multiple reasons. Per family, he initially presented there with shortness of breath and was diagnosed with a malignant pleural effusion and stage II lung cancer. While there he developed AMS and ended up receiving tpa complicated by hemorrhagic conversion. He was also intubated for a period of his hospitalization. There was concern that the focal areas of ischemia on his head CT, along with his sepsis picture were caused by endocarditis from untreated cellulitis last year, as his blood cultures were positive for strep. They never performed a JESSICA on him due to increased risk, but treated him empirically with a 4 week course of abx. He was discharged to [facility] 4 days ago. Family stated he has continued to be altered but seemed to be getting better.     Today he presented with shortness of breath and hypoxia. In ER he was satting 80% on arrival in severe respiratory distress. hypotensive to 50/30 in a wide complex rhythm, he received one defibrillation and woke up. started on bipap with improvement in hyopxia. ABG revealed compensated metabolic acidosis. He was A&O x 1 to person and was agitated.       PAST MEDICAL & SURGICAL HISTORY:  BPH (benign prostatic hyperplasia)    Osteoarthritis    Mediastinal lymphadenopathy    Lung nodules    Pleural plaque due to asbestos exposure    Adenocarcinoma of right lung    Enlarged prostate with lower urinary tract symptoms (LUTS)    PVD (peripheral vascular disease)    LBBB (left bundle branch block)    Type 2 diabetes mellitus    CAD (coronary artery disease)    Hyperparathyroidism    Obese    Elevated LFTs  details unknown from patient    CKD (chronic kidney disease)    COPD (chronic obstructive pulmonary disease)    Pre-diabetes    PAD (peripheral artery disease)  stent 2017    Asthma    Hyperlipidemia    HTN (hypertension)    History of lobectomy of lung    H/O arthroscopy of right knee    S/P tonsillectomy        SOCIAL HISTORY:    Admitted from:   [facility]   Substance abuse history:              Tobacco hx:                  Alcohol hx:              Home Opioid hx:  [employer]:                                    Preferred Language:    Surrogate/HCP/:  Daughter:  Sharon Escobar    wife Herber            Phone#: [phone]    FAMILY HISTORY:  Family history of heart attack  father  63yo MI      Baseline ADLs (prior to admission):    Allergies    No Known Allergies    Intolerances      Present Symptoms:   Dyspnea:   Nausea/Vomiting:   Anxiety:  Depressed   Fatigue:  Loss of appetite:   Pain:                                location:          Review of Systems:  Unable to obtain due to poor mentation/sedated /lethargic     MEDICATIONS  (STANDING):  atorvastatin 20 milliGRAM(s) Oral at bedtime  dexMEDEtomidine Infusion 0.2 MICROgram(s)/kG/Hr (3.77 mL/Hr) IV Continuous <Continuous>  dextrose 5%. 1000 milliLiter(s) (50 mL/Hr) IV Continuous <Continuous>  dextrose 50% Injectable 12.5 Gram(s) IV Push once  dextrose 50% Injectable 25 Gram(s) IV Push once  dextrose 50% Injectable 25 Gram(s) IV Push once  folic acid 1 milliGRAM(s) Oral daily  HYDROmorphone Infusion 1 mG/Hr (1 mL/Hr) IV Continuous <Continuous>  insulin lispro (HumaLOG) corrective regimen sliding scale   SubCutaneous every 6 hours  lactated ringers Bolus 300 milliLiter(s) IV Bolus once    MEDICATIONS  (PRN):  atropine 1% Ophthalmic Solution for SubLingual Use 1 Drop(s) SubLingual every 4 hours PRN excessive secretions  dextrose 40% Gel 15 Gram(s) Oral once PRN Blood Glucose LESS THAN 70 milliGRAM(s)/deciliter  glucagon  Injectable 1 milliGRAM(s) IntraMuscular once PRN Glucose LESS THAN 70 milligrams/deciliter  HYDROmorphone  Injectable 1 milliGRAM(s) IV Push every 4 hours PRN Moderate Pain (4 - 6)  HYDROmorphone  Injectable 1 milliGRAM(s) IV Push every 1 hour PRN Respiratory distress.  LORazepam   Injectable 1 milliGRAM(s) IV Push every 4 hours PRN respiratory distress      PHYSICAL EXAM:    Vital Signs Last 24 Hrs  T(C): 36.3 (08 Oct 2020 06:52), Max: 38 (08 Oct 2020 03:49)  T(F): 97.4 (08 Oct 2020 06:52), Max: 100.4 (08 Oct 2020 03:49)  HR: 116 (08 Oct 2020 10:30) (116 - 157)  BP: 53/41 (08 Oct 2020 10:30) (50/22 - 152/105)  BP(mean): 46 (08 Oct 2020 10:30) (19 - 124)  RR: 35 (08 Oct 2020 10:42) (28 - 46)  SpO2: 87% (08 Oct 2020 09:00) (51% - 100%)    General: lethargic distressed ,  nonverbal  unarousable   Karnofsky Performance Score/Palliative Performance Status Version2:  10   %  PPSV: 10  HEENT: normal  dry mouth    Lungs: coarse bs  tachypnea/labored breathing    CV: tachy  GI: normal  non-distended    :   shah  Musculoskeletal: normal  w/weakness  edema    bedbound  Skin: normal  , w/d   Neuro: sedated, non verbal   Oral intake ability: unable/only mouth care  Diet: [NPO]    LABS:                        11.9   11.08 )-----------( 516      ( 08 Oct 2020 03:45 )             40.6     10    137  |  99  |  15  ----------------------------<  227<H>  3.7   |  20<L>  |  1.81<H>    Ca    9.3      08 Oct 2020 03:45  Mg     2.2     10-08    TPro  7.7  /  Alb  2.7<L>  /  TBili  1.3<H>  /  DBili  x   /  AST  39  /  ALT  36  /  AlkPhos  111  10-08        RADIOLOGY & ADDITIONAL STUDIES:< from: Xray Chest 1 View-PORTABLE IMMEDIATE (10.08.20 @ 03:56) >  EXAM:  XR CHEST PORTABLE IMMED 1V      PROCEDURE DATE:  10/08/2020        INTERPRETATION:  CLINICAL HISTORY: . Chest Pain.    TECHNIQUE: Single portable AP view of the chest.    COMPARISON: Chest CT 2020, outside study. Chest radiographs 2020.    FINDINGS:  Interval decrease in prominence of collection at the right cardiophrenic angle, corresponding to a loculated fluid and gas collection on the comparison CT.    Nodular airspace opacities noted in the projecting over the left midlung again, corresponding with calcified pleural plaques best seen on the comparison CT. Linear atelectasis noted at the left lung base.    Cardiomediastinal silhouette is unremarkable.    Included upper abdomen is unremarkable.    IMPRESSION:  Decreased prominence of collection at the right cardiophrenic angle.    No new airspace consolidation identified.        ADVANCE DIRECTIVES: MOLST  DNR/DNI  comfort measures , no pressors ,   Advanced Care Planning discussion total time spent:

## 2020-10-08 NOTE — MEDICAL STUDENT ADULT H&P (EDUCATION) - NS MD HP STUD PE CONSITUTIONAL FT
Physical Exam:   Constitutional: Only responsive to painful stimuli, altered, agitated, sedated, not able to follow commands, in respiratory distress on NIV BiPAP.  Neck: No JVD.   Respiratory: Breath Sounds diminished bilaterally to auscultation, in acute respiratory distress, tachypneic on NIV BiPAP.  Cardiovascular: Severely hypotensive 50s/30s. Tachycardic 110s with hx LBB.   Gastrointestinal: Obese abdomen, Soft, non-tender, non distended, normoactive bowel sounds.  Extremities: No peripheral edema.  Vascular: Equal and normal pulses: 2+ peripheral pulses throughout.  Neurological: Altered, agitated, sedated, unable to follow commands.  Skin: Pale, cool, dry, slightly mottled.

## 2020-10-08 NOTE — PROVIDER CONTACT NOTE (EICU) - ASSESSMENT
Acute respiratory failure requiring NIV   Lactic acidosis   Metabolic acidosis (compensated)   JULIET

## 2020-10-08 NOTE — MEDICAL STUDENT ADULT H&P (EDUCATION) - NS MD HP STUD ASPLAN ASSES FT
HPI:  74 year old Male DNR/DNI, PMH DM2, HTN, asbestos exposure, prior smoker (quit 15 years ago), LBBB, BPH, recently discharged from [facility] to [facility] 5 days ago after complicated hospital course where pt presented for SOB, found to have malignant pleural effusion, dx with stage II lung cancer (dx 5 weeks ago, started on chemotherapy, s/p RLL lobectomy), hemorrhagic CVA conversion s/p tpa, intubated for a period of time during this admission, sepsis (blood cultures were positive for strep) and endocarditis (unable to be confirmed on JESSICA due to being unstable) treated with LUE PICC antibiotics x 4 weeks. Family reported pt was still altered but improving at Rehab up until yesterday. Overnight pt came in to the ER for acute respiratory distress, O2 sats in the 80%s, and hypoxic respiratory failure, requiring NIV BiPAP, pt was also hypotensive 50s/30s, with wide complex VT rhythm, ER gave one defib and pt woke up after. Pt slighlty improved on BiPAP in ER. ABG showed compensated metabolic acidosis.     Overnight Events: Since pt came in to ICU overnight he has been in acute respiratory distress on NIV BiPAP, very tachypneic, placed on Precedex (since discontinued), Ativan PRN and Dilaudid gtt. Family arrived to bedside and wishes to make pt comfort care (no CVC, pressors, intubation) they also wish to remove BiPAP when all family members arrive at 10am and to place pt on NRB for comfort.

## 2020-10-09 NOTE — DISCHARGE NOTE FOR THE EXPIRED PATIENT - HOSPITAL COURSE
1. Shock suspected septic (cultures pending) possible endocarditis.   2. Acute respiratory failure  3. Metabolic Acidosis with Lactic acidosis   4. Underlying Lung cancer,  DM II, HTN, asbestos exposure, prior smoker (quit 15 years ago), LBBB, BPH, hemorrhagic CVA, on Eliquis      Plan  After family discussion will get hospice eval  patient candidate for inpatient hospice, BUT too unstable for transfer as patient is imminent   d/c NIV place NRB when family arives  D/C Precedex and give ativan PRN  Palliative care eval.   d/w family they do not want intubation, pressors, central line, at this time abx and fluids will be futile and want his comfort.    Pt pronounced at 205 am 10/09/20 based on cardiopulmonary criteria   no spon breath   no heart rhythm or pulse 74 year old male with h/o Lung cancer diagnosed 5 weeks ago in [place] s/p RLL lobectomy started on chemo with complex hospital course where had CVA s/p TPA with hematogic conversion and bacteremia with suspected endocarditis s/p 4 weeks of abx, DM2, Asbestos exposure, Ex Smoker, LBBB, BPH, who was sent overnight from Rehab for increasing SOB and hypoxic respiratory failure.   IN ED he was satting 80% on arrival in severe respiratory distress. hypotensive to 50/30 in a wide complex rhythm, he received one defibrillation and woke up. started on bipap with improvement in hyopxia. ABG revealed compensated metabolic acidosis.   He was admitted to ICU for Bipap support where family decided on comfort support pain control and no escalation of care.  when family bedside bipap removed Non rebreather placed and patient passed peacefully.     Assessment  1. Shock suspected septic (cultures pending) possible endocarditis.   2. Acute respiratory failure  3. Metabolic Acidosis with Lactic acidosis   4. Underlying Lung cancer,  DM II, HTN, asbestos exposure, prior smoker (quit 15 years ago), LBBB, BPH, hemorrhagic CVA, on Eliquis    For full account of hospital course please refer to actual medical records. As this is a brief summery.

## 2020-10-10 LAB
-  AMIKACIN: SIGNIFICANT CHANGE UP
-  AMPICILLIN/SULBACTAM: SIGNIFICANT CHANGE UP
-  AMPICILLIN: SIGNIFICANT CHANGE UP
-  AZTREONAM: SIGNIFICANT CHANGE UP
-  CEFAZOLIN: SIGNIFICANT CHANGE UP
-  CEFEPIME: SIGNIFICANT CHANGE UP
-  CEFOXITIN: SIGNIFICANT CHANGE UP
-  CEFTRIAXONE: SIGNIFICANT CHANGE UP
-  CIPROFLOXACIN: SIGNIFICANT CHANGE UP
-  ERTAPENEM: SIGNIFICANT CHANGE UP
-  GENTAMICIN: SIGNIFICANT CHANGE UP
-  IMIPENEM: SIGNIFICANT CHANGE UP
-  LEVOFLOXACIN: SIGNIFICANT CHANGE UP
-  MEROPENEM: SIGNIFICANT CHANGE UP
-  PIPERACILLIN/TAZOBACTAM: SIGNIFICANT CHANGE UP
-  TOBRAMYCIN: SIGNIFICANT CHANGE UP
-  TRIMETHOPRIM/SULFAMETHOXAZOLE: SIGNIFICANT CHANGE UP
CULTURE RESULTS: SIGNIFICANT CHANGE UP
METHOD TYPE: SIGNIFICANT CHANGE UP
ORGANISM # SPEC MICROSCOPIC CNT: SIGNIFICANT CHANGE UP
SPECIMEN SOURCE: SIGNIFICANT CHANGE UP

## 2020-10-13 LAB
CULTURE RESULTS: SIGNIFICANT CHANGE UP
SPECIMEN SOURCE: SIGNIFICANT CHANGE UP

## 2020-10-20 NOTE — ED PROCEDURE NOTE - PROCEDURE ADDITIONAL DETAILS
wide complex tachy HR ~150 on presentation, with hypotension (50s/30s), AMS. , pt was shocked 200j as pt unstable with concern for possible vtach, although rhythm more likely sinus tach with LBBB (h/o LBBB). No change in rhythm p shock. pt did express pain and became  more alert briefly

## 2021-07-13 NOTE — GOALS OF CARE CONVERSATION - ADVANCED CARE PLANNING - DOES PATIENT HAVE A SURROGATE
Please advise the patient that her endometrial biopsy results and other culture studies done were negative.  She should follow-up as previously scheduled Yes

## 2022-07-27 NOTE — H&P CARDIOLOGY - NSALCOHOLTYPE_GEN__A_CORE_SD
Ms. Marin was seen and examined on rounds. Overall much improved. She is passing flatus and bowel movement. Plain film demonstrates contrast in descending colon. NGT removed. Abdominal exam benign. Will begin clears progress to regular diet. Pending diet tolerance discharge in PM vs AM.
PAST SURGICAL HISTORY:  History of appendectomy     History of coronary artery stent placement 2006 & 2010    History of hernia repair     History of sleeve gastrectomy     
beer
Home

## 2023-01-11 NOTE — CONSULT NOTE ADULT - SUBJECTIVE AND OBJECTIVE BOX
HPI:  Patient is a 73 male with pmhx of CAD, CKD, COPD, DM2 (diet controlled), PAD (s/p right groin stent), LBBB, HTN, HLD, BPH, hyperparathyroidism, hx of etoh abuse, presented to ED with right lower extremity swelling, erythema, and tenderness for 4 days. Patient stated that his symptoms started all the sudden when he woke up 4 days ago, noticed that his leg swelling extended up to his right inner thigh region, unable to ambulate without significant tenderness. Patient stated that he was gardening about 1 week ago, unsure if he scratched himself, however wife at bedside stated that patient was trimming hedges and she noticed dried blood on his right leg when he came into the house, denied any other trauma or noticed any insect bites. Reports decreased appetite for the past few days. Stated that he was told he had a temperature at the [facility] today, unsure of the temp. Denied any recent antibiotic use outpatient for his symptoms. Patient denies any headache, dizziness, chest pain, palpitations, sob, lai, abdominal pain, nausea, vomiting, diarrhea, dysuria, hematuria, melena, recent travel.     In the ED, patient was afebrile T99, 80 bpm, 115/63, RR 18@ 96% room air  WBC 10.99, H/H 14.2/42.3, plt 188, ESR 67  Na 138, K 3.4, BUN 26, Cr 1.5, glucose 116, GFR 45  Procalc 2.39  RLE doppler neg for dvt  R ankle xray showed Old chip fracture medial malleolus. No acute fracture dislocation or bone destruction. Soft tissue swelling. Vascular calcification.  R tibia/fibula xray showed No fracture malalignment bone destruction or unusual periosteal reaction. Mild narrowing medial compartment of the knee. Vascular calcification. Nonspecific soft tissue edema throughout the calf.  R knee xray showed No fracture dislocation. Medial compartment narrowing and juxta-articular sclerosis tibial plateau. Calcification medial collateral ligament. Vascular calcification. Small suprapatellar effusion. Nonspecific soft tissue edema.  Received 1x unasyn, 1x vancomycin in ED (27 May 2019 16:45)      PAST MEDICAL & SURGICAL HISTORY:  Enlarged prostate with lower urinary tract symptoms (LUTS)  PVD (peripheral vascular disease)  LBBB (left bundle branch block)  Type 2 diabetes mellitus  CAD (coronary artery disease)  Hyperparathyroidism  Obese  Elevated LFTs: details unknown from patient  CKD (chronic kidney disease)  COPD (chronic obstructive pulmonary disease)  Pre-diabetes  PAD (peripheral artery disease): stent 2017  Asthma  Hyperlipidemia  HTN (hypertension)  S/P tonsillectomy      Antimicrobials  ampicillin/sulbactam  IVPB 3 Gram(s) IV Intermittent every 6 hours  vancomycin  IVPB 1250 milliGRAM(s) IV Intermittent every 12 hours      Immunological      Other  amLODIPine   Tablet 10 milliGRAM(s) Oral daily  aspirin enteric coated 81 milliGRAM(s) Oral daily  atorvastatin 10 milliGRAM(s) Oral at bedtime  clopidogrel Tablet 75 milliGRAM(s) Oral daily  famotidine    Tablet 20 milliGRAM(s) Oral two times a day  fenofibrate Tablet 48 milliGRAM(s) Oral daily  heparin  Injectable 5000 Unit(s) SubCutaneous every 8 hours  hydrALAZINE 50 milliGRAM(s) Oral two times a day  metoprolol succinate ER 50 milliGRAM(s) Oral daily  oxyCODONE    5 mG/acetaminophen 325 mG 1 Tablet(s) Oral every 6 hours PRN  tamsulosin 0.4 milliGRAM(s) Oral at bedtime      Allergies    No Known Allergies    Intolerances        SOCIAL HISTORY: no tobacco use    FAMILY HISTORY:  Family history of heart attack: father  61yo MI      ROS:    EYES:  Negative  blurry vision or double vision  GASTROINTESTINAL:  Negative for nausea, vomiting, diarrhea  -otherwise negative except for subjective    Vital Signs Last 24 Hrs  T(C): 36.9 (28 May 2019 12:10), Max: 37.3 (28 May 2019 04:58)  T(F): 98.5 (28 May 2019 12:10), Max: 99.2 (28 May 2019 04:58)  HR: 68 (28 May 2019 12:10) (68 - 83)  BP: 121/67 (28 May 2019 12:10) (115/63 - 148/72)  BP(mean): --  RR: 16 (28 May 2019 12:10) (15 - 18)  SpO2: 95% (28 May 2019 12:10) (94% - 98%)    PE:  WDWN in no distress  HEENT:  NC, PERRL, sclerae anicteric, conjunctivae clear, EOMI.  Sinuses nontender, no nasal exudate.  No buccal or pharyngeal lesions, erythema or exudate  Neck:  Supple, no adenopathy  Lungs:  No accessory muscle use, bilaterally clear to auscultation  Cor:  RRR, S1, S2, no murmur appreciated  Abd:  Symmetric, normoactive BS.  Soft, nontender, no masses, guarding or rebound.  Liver and spleen not enlarged  Extrem/Skin: RLE with edema, erythema, warm, tender- receded from inked margins  Neuro: grossly intact  Musc: moving all limbs freely, no focal deficits    LABS:                        12.4   10.30 )-----------( 182      ( 28 May 2019 05:44 )             37.0       WBC Count: 10.30 K/uL (19 @ 05:44)  WBC Count: 10.99 K/uL (19 @ 13:46)          141  |  108  |  20  ----------------------------<  98  3.5   |  24  |  1.10    Ca    8.0<L>      28 May 2019 05:44  Mg     2.3         TPro  6.2  /  Alb  2.8<L>  /  TBili  0.9  /  DBili  x   /  AST  33  /  ALT  33  /  AlkPhos  55        Creatinine, Serum: 1.10 mg/dL (19 @ 05:44)  Creatinine, Serum: 1.50 mg/dL (19 @ 13:46)      Urinalysis Basic - ( 27 May 2019 18:40 )    Color: Yellow / Appearance: Clear / S.025 / pH: x  Gluc: x / Ketone: Trace  / Bili: Negative / Urobili: 1   Blood: x / Protein: 75 mg/dL / Nitrite: Negative   Leuk Esterase: Negative / RBC: 0-2 /HPF / WBC 0-2   Sq Epi: x / Non Sq Epi: Negative / Bacteria: Occasional            MICROBIOLOGY:        RADIOLOGY & ADDITIONAL STUDIES:    -- Hospitalist Discharge Summary   Admit Date:  2023  8:03 PM   DC Note date: 2023  Name:  Chong Patient   Age:  60 y.o. Sex:  female  :  1964   MRN:  [id number]   Room:  [address] [place] [address]  PCP:  Morenita Martinez MD    Presenting Complaint: Hypertension     Initial Admission Diagnosis: Elevation of cardiac enzymes [R74.8]  Elevated troponin level [R77.8]  Hypertensive emergency without congestive heart failure [I16.1]  Hypertensive emergency [I16.1]     Problem List for this Hospitalization (present on admission):    Principal Problem:    Hypertensive emergency  Active Problems:    Elevation of cardiac enzymes    Leukocytosis    Demand ischemia ([address]    Hyperlipidemia    Essential hypertension  Resolved Problems:    * No resolved hospital problems. *      Hospital Course:      Chong Patient is a 60 y.o. female with medical history of  HLP   Admitted with hypertensive emergency and chest pain. Admitted to ICU for IV cardene drip. Coreg started. Added cozaar/ norvasc. Troponin elevated. Cardiology following. ECHO preserved EF. S/p LHC on 1-10-23, stenting RCA. Brilinta added. CXR negative. CTA chest/AP negative excluding small granuloma. UA has hematuria- recommend urology followup. Discharge plans  to home. Disposition: home     Diet: ADULT DIET; Regular; Low Fat/Low Chol/High Fiber/2 gm Na  Code Status: Full Code    Follow Ups:      Time spent in patient discharge and coordination 35  minutes. Follow up labs/diagnostics (ultimately defer to outpatient provider):  Hematuria     Plan was discussed with patient. All questions answered. Patient was stable at time of discharge. Instructions given to call a physician or return if any concerns.     Current Discharge Medication List        START taking these medications    Details   aspirin 81 MG chewable tablet Take 1 tablet by mouth daily  Qty: 30 tablet, Refills: 0      amLODIPine (NORVASC) 5 MG tablet Take 1 tablet by mouth in the morning and 1 tablet in the evening. Qty: 60 tablet, Refills: 0      ticagrelor (BRILINTA) 90 MG TABS tablet Take 1 tablet by mouth 2 times daily  Qty: 60 tablet, Refills: 0      losartan (COZAAR) 50 MG tablet Take 1 tablet by mouth in the morning and 1 tablet in the evening. Qty: 60 tablet, Refills: 0           CONTINUE these medications which have CHANGED    Details   rosuvastatin (CRESTOR) 20 MG tablet Take 1 tablet by mouth nightly  Qty: 30 tablet, Refills: 0           CONTINUE these medications which have NOT CHANGED    Details   estradiol (VIVELLE) 0.1 MG/24HR Place 1 patch onto the skin Twice a Week  Qty: 8 patch, Refills: 3           STOP taking these medications       cetirizine (ZYRTEC) 10 MG tablet Comments:   Reason for Stopping:         ascorbic acid (VITAMIN C) 500 MG tablet Comments:   Reason for Stopping:         clorazepate (TRANXENE) 7.5 MG tablet Comments:   Reason for Stopping:               Procedures done this admission:  Procedure(s):  LEFT HEART CATH / CORONARY ANGIOGRAPHY  Percutaneous coronary intervention    Consults this admission:  IP CONSULT TO CARDIAC REHAB    Echocardiogram results:  01/09/23    TRANSTHORACIC ECHOCARDIOGRAM (TTE) COMPLETE (CONTRAST/BUBBLE/3D PRN) 01/10/2023 11:28 AM, 01/10/2023 12:00 AM (Final)    Interpretation Summary    Left Ventricle: Normal left ventricular systolic function with a visually estimated EF of 55 - 60%. Left ventricle size is normal. Normal wall thickness. Normal wall motion. Normal diastolic function. Mitral Valve: Mild regurgitation. Technical qualifiers: Color flow Doppler was performed and pulse wave and/or continuous wave Doppler was performed. Signed by: Zachariah Joseph MD on 1/10/2023 11:28 AM, Signed by: Unknown Provider Result on 1/10/2023 12:00 AM      Diagnostic Imaging/Tests:   XR CHEST (2 VW)    Result Date: 1/9/2023  No consolidation.      CTA CHEST ABDOMEN PELVIS W WO CONTRAST    Result Date: 1/10/2023  No evidence of acute aortic disease.         Labs: Results:       BMP, Mg, Phos Recent Labs     01/09/23  1919 01/10/23  0651 01/11/23  0330    141 138   K 3.6 3.7 3.4*    105 105   CO2 29 29 26   ANIONGAP 4 7 7   BUN 14 13 15   CREATININE 1.00 0.80 0.90   LABGLOM >60 >60 >60   CALCIUM 8.7 9.2 8.8   GLUCOSE 105* 89 108*   MG 2.3  --  2.7*   PHOS 3.2  --   --       CBC Recent Labs     01/09/23 1919 01/11/23  0330   WBC 11.7* 10.0   RBC 4.81 4.63   HGB 14.0 13.5   HCT 43.3 41.6   MCV 90.0 89.8   MCH 29.1 29.2   MCHC 32.3 32.5   RDW 11.7* 11.9    308   MPV 11.9 11.0   NRBC 0.00 0.00   SEGS 71 74   LYMPHOPCT 21 15   EOSRELPCT 1 2   MONOPCT 6 7   BASOPCT 1 1   IMMGRAN 0 1   SEGSABS 8.3* 7.6   LYMPHSABS 2.4 1.5   EOSABS 0.2 0.2   MONOSABS 0.7 0.7   BASOSABS 0.1 0.1   ABSIMMGRAN 0.0 0.1      LFT Recent Labs     01/09/23 1919   BILITOT 0.3   ALKPHOS 83   AST 25   ALT 43   PROT 7.9   LABALBU 4.1   GLOB 3.8      Cardiac  Lab Results   Component Value Date/Time    TROPHS 814.0 01/09/2023 09:24 PM    TROPHS 875.0 01/09/2023 07:19 PM      Coags No results found for: PROTIME, INR, APTT   A1c No results found for: LABA1C, EAG   Lipids Lab Results   Component Value Date/Time    CHOL 248 01/10/2023 06:51 AM    LDLCALC 164.2 01/10/2023 06:51 AM    LABVLDL 49.8 01/10/2023 06:51 AM    HDL 34 01/10/2023 06:51 AM    CHOLHDLRATIO 7.3 01/10/2023 06:51 AM    TRIG 249 01/10/2023 06:51 AM      Thyroid  Lab Results   Component Value Date/Time    GIW7OZY 1.260 06/21/2022 03:51 PM        Most Recent UA Lab Results   Component Value Date/Time    COLORU YELLOW/STRAW 06/21/2022 03:51 PM    APPEARANCE CLEAR 06/21/2022 03:51 PM    SPECGRAV 1.013 06/21/2022 03:51 PM    LABPH 7.5 06/21/2022 03:51 PM    PROTEINU Negative 06/21/2022 03:51 PM    GLUCOSEU Negative 01/09/2023 07:33 PM    GLUCOSEU Negative 06/21/2022 03:51 PM    KETUA Negative 06/21/2022 03:51 PM    BILIRUBINUR Negative 06/21/2022 03:51 PM    BLOODU MODERATE 01/09/2023 07:33 PM    BLOODU Negative 06/21/2022 03:51 PM    UROBILINOGEN 0.2 06/21/2022 03:51 PM    NITRU Negative 06/21/2022 03:51 PM    LEUKOCYTESUR Negative 06/21/2022 03:51 PM    WBCUA 0-3 06/21/2022 03:51 PM    RBCUA 0 06/21/2022 03:51 PM    EPITHUA 0-3 06/21/2022 03:51 PM    BACTERIA 0 06/21/2022 03:51 PM    LABCAST 0 06/21/2022 03:51 PM    MUCUS 0 06/21/2022 03:51 PM        No results for input(s): CULTURE in the last 720 hours.     All Labs from Last 24 Hrs:  Recent Results (from the past 24 hour(s))   Cardiac procedure    Collection Time: 01/10/23  2:03 PM   Result Value Ref Range    Body Surface Area 1.87 m2   EKG 12 lead    Collection Time: 01/10/23  4:07 PM   Result Value Ref Range    Ventricular Rate 56 BPM    Atrial Rate 56 BPM    P-R Interval 184 ms    QRS Duration 98 ms    Q-T Interval 480 ms    QTc Calculation (Bazett) 463 ms    P Axis 44 degrees    R Axis -23 degrees    T Axis 56 degrees    Diagnosis Sinus bradycardia    EKG 12 Lead    Collection Time: 01/11/23  2:00 AM   Result Value Ref Range    Ventricular Rate 69 BPM    Atrial Rate 69 BPM    P-R Interval 180 ms    QRS Duration 104 ms    Q-T Interval 450 ms    QTc Calculation (Bazett) 482 ms    P Axis 52 degrees    R Axis 3 degrees    T Axis 52 degrees    Diagnosis Normal sinus rhythm    Basic Metabolic Panel w/ Reflex to MG    Collection Time: 01/11/23  3:30 AM   Result Value Ref Range    Sodium 138 133 - 143 mmol/L    Potassium 3.4 (L) 3.5 - 5.1 mmol/L    Chloride 105 101 - 110 mmol/L    CO2 26 21 - 32 mmol/L    Anion Gap 7 2 - 11 mmol/L    Glucose 108 (H) 65 - 100 mg/dL    BUN 15 6 - 23 MG/DL    Creatinine 0.90 0.6 - 1.0 MG/DL    Est, Glom Filt Rate >60 >60 ml/min/1.73m2    Calcium 8.8 8.3 - 10.4 MG/DL   CBC with Auto Differential    Collection Time: 01/11/23  3:30 AM   Result Value Ref Range    WBC 10.0 4.3 - 11.1 K/uL    RBC 4.63 4.05 - 5.2 M/uL    Hemoglobin 13.5 11.7 - 15.4 g/dL    Hematocrit 41.6 35.8 - 46.3 %    MCV 89.8 82 - 102 FL MCH 29.2 26.1 - 32.9 PG    MCHC 32.5 31.4 - 35.0 g/dL    RDW 11.9 11.9 - 14.6 %    Platelets 700 922 - 929 K/uL    MPV 11.0 9.4 - 12.3 FL    nRBC 0.00 0.0 - 0.2 K/uL    Differential Type AUTOMATED      Seg Neutrophils 74 43 - 78 %    Lymphocytes 15 13 - 44 %    Monocytes 7 4.0 - 12.0 %    Eosinophils % 2 0.5 - 7.8 %    Basophils 1 0.0 - 2.0 %    Immature Granulocytes 1 0.0 - 5.0 %    Segs Absolute 7.6 1.7 - 8.2 K/UL    Absolute Lymph # 1.5 0.5 - 4.6 K/UL    Absolute Mono # 0.7 0.1 - 1.3 K/UL    Absolute Eos # 0.2 0.0 - 0.8 K/UL    Basophils Absolute 0.1 0.0 - 0.2 K/UL    Absolute Immature Granulocyte 0.1 0.0 - 0.5 K/UL   Magnesium    Collection Time: 01/11/23  3:30 AM   Result Value Ref Range    Magnesium 2.7 (H) 1.8 - 2.4 mg/dL       Allergies   Allergen Reactions    Ciprofloxacin Rash and Shortness Of Breath    Nitrofurantoin Shortness Of Breath    Sulfa Antibiotics Other (See Comments)    Cimetidine Rash    Clindamycin Hcl Rash     Immunization History   Administered Date(s) Administered    COVID-19, PFIZER PURPLE top, DILUTE for use, (age 15 y+), 30mcg/0.3mL 03/15/2021, 04/06/2021    Tdap (Boostrix, Adacel) 06/07/2018       Recent Vital Data:  Patient Vitals for the past 24 hrs:   Temp Pulse Resp BP SpO2   01/11/23 1200 -- 54 19 (!) 167/73 99 %   01/11/23 1100 -- 55 20 (!) 166/71 98 %   01/11/23 1000 -- 54 16 (!) 161/70 97 %   01/11/23 0935 -- 57 13 (!) 174/72 97 %   01/11/23 0900 -- 62 16 (!) 170/73 97 %   01/11/23 0700 -- 53 10 (!) 154/67 96 %   01/11/23 0530 -- 57 12 138/63 96 %   01/11/23 0515 -- 68 (!) 31 (!) 144/67 95 %   01/11/23 0500 -- 63 13 (!) 124/55 94 %   01/11/23 0445 -- 53 14 133/61 95 %   01/11/23 0430 -- 55 12 (!) 143/65 95 %   01/11/23 0415 -- 58 (!) 8 (!) 143/65 96 %   01/11/23 0400 -- 57 10 136/63 96 %   01/11/23 0345 -- 74 30 (!) 125/59 98 %   01/11/23 0330 -- 58 (!) 9 135/64 97 %   01/11/23 0315 -- 60 12 (!) 140/66 95 %   01/11/23 0300 98.7 °F (37.1 °C) 59 14 135/63 94 %   01/11/23 0245 -- 63 (!) 7 139/66 95 %   01/11/23 0230 -- 62 (!) 8 (!) 140/65 95 %   01/11/23 0215 -- 64 10 (!) 123/57 96 %   01/11/23 0201 -- 69 15 119/60 96 %   01/11/23 0200 -- 68 27 (!) 117/58 95 %   01/11/23 0159 -- 71 14 (!) 92/51 93 %   01/11/23 0158 -- 65 16 (!) 75/38 95 %   01/11/23 0155 -- (!) 34 10 (!) 75/40 97 %   01/11/23 0100 -- 53 12 (!) 141/67 92 %   01/11/23 0000 -- 52 23 (!) 146/67 91 %   01/10/23 2300 98.3 °F (36.8 °C) (!) 49 17 (!) 144/68 93 %   01/10/23 2200 -- 50 24 (!) 149/66 92 %   01/10/23 2100 -- 53 24 (!) 148/69 93 %   01/10/23 2000 -- 58 26 (!) 157/69 95 %   01/10/23 1900 98.2 °F (36.8 °C) 58 11 (!) 164/82 95 %   01/10/23 1800 -- 83 19 -- --   01/10/23 1700 -- 69 15 (!) 158/97 96 %   01/10/23 1600 -- 53 12 (!) 141/73 95 %   01/10/23 1530 -- 53 12 128/71 94 %   01/10/23 1505 98.1 °F (36.7 °C) 52 12 134/71 94 %       Oxygen Therapy  SpO2: 99 %  Pulse Oximetry Type: Continuous  Pulse via Oximetry: 52 beats per minute  Pulse Oximeter Device Mode: Continuous  Pulse Oximeter Device Location: Left, Finger  O2 Device: None (Room air)  Oximetry Probe Site Changed: Yes  Skin Assessment: Clean, dry, & intact  Skin Protection for O2 Device: N/A    Estimated body mass index is 32.34 kg/m² as calculated from the following:    Height as of this encounter: 5' 2\" (1.575 m). Weight as of this encounter: 176 lb 12.8 oz (80.2 kg). Intake/Output Summary (Last 24 hours) at 1/11/2023 1314  Last data filed at 1/11/2023 0900  Gross per 24 hour   Intake 740 ml   Output 505 ml   Net 235 ml         Physical Exam:    General:    Well nourished. No overt distress  CV:   RRR. No m/r/g. No JVD, no edema   Lungs:   CTAB. No wheezing, rhonchi, or rales. Respirations even, unlabored  Abdomen:   Soft, nontender, nondistended. Extremities: Warm and dry. No cyanosis or clubbing. No edema. Skin:     No rashes. Normal coloration  Neuro:  grossly intact. Psych:  Normal mood and affect.     Signed:  Rajiv Hurtado MD    Part of this note may have been written by using a voice dictation software. The note has been proof read but may still contain some grammatical/other typographical errors.

## 2023-05-11 NOTE — DISCHARGE NOTE PROVIDER - PROVIDER RX CONTACT NUMBER
[FreeTextEntry1] : 5/11 took culture and - betadine adapatax drawtex and xtrasorb unna\par going for us on 5/25 \par 32 ankle left/ right\par 4/20 Not making much progress with gential violet\par will start antibiotics amoxicillin reordered\par \par 3/23/23\par 85 yr w with  cellulitis and overload htn afib now on xarelto- repeat gentian blue again today\par trial augmentin\par no bleeding,   wound to the right lower extremity.  some progress , different area on leg each time\par lymphedema pump \par alginate extrasorb  gentian blue , too wet for a skin sub\par  extrasorb/  Swelling noted to the extremity.  \par EVERETTE/PVR: and venous reflux done and results discussed with patient.  wnl- needs repeat this year\par Edema noted no clinical sign of infection\par Recommendation:\par Apply lidocaine or topical anesthetic.\par Wash wound with ----0.9% saline/\par Apply ---foam/ Kerramax dynaflex extrasorbankle right and left 28.5\par Apply --- compression stocking \par Change dressing ---daily\par Leg elevation as tolerated\par Encouraged ambulation or exercise.\par Optimization of nutrition.\par Offloading to the wound site.\par not using lymphedema pump\par no recent vascular studies\par measured for compression garments\par  \par Nursing orders given\par Follow up in 2 weeks \par Patient using lymphedema pump 2x's per day\par   by nurse with Xtrasorb and unna boot, dressing was fully saturated\par  and skin was moist due to drainage.\par \par  \par RLE erythema and warmth up to knee.  Pt says started yesterday.  \par Pt still using pump 2x/day 20min each\par wounds still draining copious amounts\par On exam, leg with slightly more edema.  Warm and erythematous.\par   Wounds clean with red wound bed but tender on exam.  Cx taken.  \par No iodor fluctuance or crepitus\par  \par Venous studies ordered\par Nursing orders given\par Follow up in 2 weeks\par \par 12/08/2022\par right calf wounds drying up, less drainage using pump\par only mechanical debridement today\par On exam, leg with slightly more edema.  Warm and erythematous. patient on toresamide\par  paint betadine, Adaptic, Xtrasorb, dynaflex\par Venous studies ordered, was suppose to get it done on 12/07/2022, was canceled due to facility not being able to do the  dressing change , encouraged to make the appointment\par Nursing orders given\par Follow up in 2 weeks\par \par pt was positioned with team to expose the wound ( irvin)The patient was positioned to allow for optimization of imaging.\par   with room lights dimmed  The fluorescence imaging device was placed 8-12 cm from the patient and the clinical darkness required for imaging was obtained  \par The Zillabyte i:X fluorescence imaging device was used to report presence and location   red and cyan on edge fluorescence, indicative of bacteria at loads greater than 104 CFU/g in real-time. Images reported to have  fluorescence. The wound was -mechanically cleansed with Dakins 0.125% e) and/or underwent excisional debridement.  Fluorescence images acquired after cleansing demonstrated reduction in bacteria.\par  \par cleansed with bacteriocidal :dasakins\par  debridement  : 1 mm deeper into sub cut\par \par 1/31/23\par Patient presents with right calf wound with a large amount of drainage. Patient using methylene blue to paint wounds. Patient denies pain at this time. Unna boot being changed three times a week by home care nurse.\par mechanical debridement with Vashe\par Antibiotics ordered\par Adaptic/Exu-dry/multilayer dressing today\par Nursing orders given\par Follow up in 2-3 weeks\par \par 04/20/2023\par Patient presents with right calf wound with a large amount of drainage. Patient using methylene blue to paint wounds. Patient denies pain at this time. Unna boot being changed three times a week by home care nurse.\par mechanical debridement with Vashe\par Antibiotics ordered\par Adaptic/ drawtex, Exu-dry/multilayer dressing today, stop violet\par Nursing orders given\par Follow up in 2-3 weeks [phone]

## 2023-11-15 NOTE — PHYSICAL THERAPY INITIAL EVALUATION ADULT - SITTING BALANCE: STATIC
Triage Evaluation.   This exam was performed as a rapid assessment in triage.   Patient will be evaluated by an emergemcy department provider for further care and management.     Chief complant:     CP/Pressure x 3 days. +SOB w/ activity.   No F/C. NO leg pain or swelling. +Cough.     Stopped Eliquis in July after PE/DVT post op.     Plan of Care:   Basic Labs with cardiac markers, ddimer.   CXR. EKG.      Sabra Moore, CNP  11/15/23 [id number]     normal balance

## 2024-01-25 NOTE — PROGRESS NOTE ADULT - PROVIDER SPECIALTY LIST ADULT
CT Surgery
Pain Medicine
Thoracic Surgery
Thoracic Surgery
No

## 2024-06-20 NOTE — H&P CARDIOLOGY - EXTREMITIES
From: Ashu Swanson  To: Ramírez Pino MD  Sent: 6/20/2024 12:18 PM CDT  Subject: Prescription drugs for Weight Loss    Hi Dr Pino,    I have been trying unsuccessfully to lose weight through diet and exercise. What are you recommendations regarding prescription drugs for weight loss (like Ozempic), specially with my heart and high blood pressure issues/medication.    Regards,    Ashu Swanson      No cyanosis, clubbing or edema

## 2024-08-30 NOTE — PROCEDURE NOTE - NSINFORMCONSENT_GEN_A_CORE
Patient mobility status  with no difficulty. Provider aware     I have reviewed discharge instructions with the patient and spouse.  The patient and spouse verbalized understanding.    Patient left ED via Discharge Method: ambulatory to Home with Spouse.    Opportunity for questions and clarification provided.     Patient given 0 scripts.     Did not want to stay for 30 minute shot time  
This was an emergent procedure.

## 2024-12-24 NOTE — ED ADULT TRIAGE NOTE - WEIGHT IN LBS
I concur with the Admission Order and I certify that services are provided in accordance with Section 42 CFR § 412.3 240